# Patient Record
Sex: MALE | Race: WHITE | NOT HISPANIC OR LATINO | Employment: OTHER | URBAN - METROPOLITAN AREA
[De-identification: names, ages, dates, MRNs, and addresses within clinical notes are randomized per-mention and may not be internally consistent; named-entity substitution may affect disease eponyms.]

---

## 2021-07-16 ENCOUNTER — APPOINTMENT (OUTPATIENT)
Dept: CT IMAGING | Facility: HOSPITAL | Age: 71
End: 2021-07-16

## 2021-07-16 ENCOUNTER — HOSPITAL ENCOUNTER (INPATIENT)
Facility: HOSPITAL | Age: 71
LOS: 8 days | Discharge: HOME | End: 2021-07-24
Attending: EMERGENCY MEDICINE | Admitting: INTERNAL MEDICINE

## 2021-07-16 DIAGNOSIS — K85.00 IDIOPATHIC ACUTE PANCREATITIS WITHOUT INFECTION OR NECROSIS: Primary | ICD-10-CM

## 2021-07-16 DIAGNOSIS — E43 SEVERE PROTEIN-CALORIE MALNUTRITION (HCC): ICD-10-CM

## 2021-07-16 DIAGNOSIS — K74.69 OTHER CIRRHOSIS OF LIVER (HCC): ICD-10-CM

## 2021-07-16 DIAGNOSIS — K86.89 PANCREATIC MASS: ICD-10-CM

## 2021-07-16 PROBLEM — K85.90 ACUTE PANCREATITIS: Status: ACTIVE | Noted: 2021-07-16

## 2021-07-16 LAB
ALBUMIN SERPL-MCNC: 3.4 G/DL (ref 3.5–5.2)
ALBUMIN/GLOB SERPL: 1.2 G/DL
ALP SERPL-CCNC: 157 U/L (ref 39–117)
ALT SERPL W P-5'-P-CCNC: 28 U/L (ref 1–41)
ANION GAP SERPL CALCULATED.3IONS-SCNC: 11 MMOL/L (ref 5–15)
AST SERPL-CCNC: 36 U/L (ref 1–40)
BASOPHILS # BLD AUTO: 0.02 10*3/MM3 (ref 0–0.2)
BASOPHILS NFR BLD AUTO: 0.2 % (ref 0–1.5)
BILIRUB SERPL-MCNC: 0.4 MG/DL (ref 0–1.2)
BILIRUB UR QL STRIP: NEGATIVE
BUN SERPL-MCNC: 23 MG/DL (ref 8–23)
BUN/CREAT SERPL: 27.4 (ref 7–25)
CALCIUM SPEC-SCNC: 8.6 MG/DL (ref 8.6–10.5)
CHLORIDE SERPL-SCNC: 92 MMOL/L (ref 98–107)
CHOLEST SERPL-MCNC: 115 MG/DL (ref 0–200)
CHOLEST SERPL-MCNC: 118 MG/DL (ref 0–200)
CLARITY UR: CLEAR
CO2 SERPL-SCNC: 30 MMOL/L (ref 22–29)
COLOR UR: YELLOW
CREAT SERPL-MCNC: 0.84 MG/DL (ref 0.76–1.27)
D-LACTATE SERPL-SCNC: 1.5 MMOL/L (ref 0.5–2)
DEPRECATED RDW RBC AUTO: 47.6 FL (ref 37–54)
EOSINOPHIL # BLD AUTO: 0.02 10*3/MM3 (ref 0–0.4)
EOSINOPHIL NFR BLD AUTO: 0.2 % (ref 0.3–6.2)
ERYTHROCYTE [DISTWIDTH] IN BLOOD BY AUTOMATED COUNT: 13.3 % (ref 12.3–15.4)
ETHANOL BLD-MCNC: <10 MG/DL (ref 0–10)
FLUAV RNA RESP QL NAA+PROBE: NOT DETECTED
FLUBV RNA RESP QL NAA+PROBE: NOT DETECTED
GFR SERPL CREATININE-BSD FRML MDRD: 90 ML/MIN/1.73
GLOBULIN UR ELPH-MCNC: 2.9 GM/DL
GLUCOSE SERPL-MCNC: 105 MG/DL (ref 65–99)
GLUCOSE UR STRIP-MCNC: NEGATIVE MG/DL
HCT VFR BLD AUTO: 36.9 % (ref 37.5–51)
HDLC SERPL-MCNC: 30 MG/DL (ref 40–60)
HDLC SERPL-MCNC: 30 MG/DL (ref 40–60)
HGB BLD-MCNC: 12.6 G/DL (ref 13–17.7)
HGB UR QL STRIP.AUTO: NEGATIVE
HOLD SPECIMEN: NORMAL
IMM GRANULOCYTES # BLD AUTO: 0.02 10*3/MM3 (ref 0–0.05)
IMM GRANULOCYTES NFR BLD AUTO: 0.2 % (ref 0–0.5)
INR PPP: 1.12 (ref 0.85–1.16)
KETONES UR QL STRIP: NEGATIVE
LDLC SERPL CALC-MCNC: 51 MG/DL (ref 0–100)
LDLC SERPL CALC-MCNC: 53 MG/DL (ref 0–100)
LDLC/HDLC SERPL: 1.42 {RATIO}
LDLC/HDLC SERPL: 1.51 {RATIO}
LEUKOCYTE ESTERASE UR QL STRIP.AUTO: NEGATIVE
LIPASE SERPL-CCNC: 706 U/L (ref 13–60)
LYMPHOCYTES # BLD AUTO: 1.75 10*3/MM3 (ref 0.7–3.1)
LYMPHOCYTES NFR BLD AUTO: 21 % (ref 19.6–45.3)
MCH RBC QN AUTO: 33.1 PG (ref 26.6–33)
MCHC RBC AUTO-ENTMCNC: 34.1 G/DL (ref 31.5–35.7)
MCV RBC AUTO: 96.9 FL (ref 79–97)
MONOCYTES # BLD AUTO: 0.45 10*3/MM3 (ref 0.1–0.9)
MONOCYTES NFR BLD AUTO: 5.4 % (ref 5–12)
NEUTROPHILS NFR BLD AUTO: 6.09 10*3/MM3 (ref 1.7–7)
NEUTROPHILS NFR BLD AUTO: 73 % (ref 42.7–76)
NITRITE UR QL STRIP: NEGATIVE
NRBC BLD AUTO-RTO: 0 /100 WBC (ref 0–0.2)
PH UR STRIP.AUTO: 7 [PH] (ref 5–8)
PLATELET # BLD AUTO: 249 10*3/MM3 (ref 140–450)
PMV BLD AUTO: 9.3 FL (ref 6–12)
POTASSIUM SERPL-SCNC: 4.1 MMOL/L (ref 3.5–5.2)
PROT SERPL-MCNC: 6.3 G/DL (ref 6–8.5)
PROT UR QL STRIP: NEGATIVE
PROTHROMBIN TIME: 14.1 SECONDS (ref 11.4–14.4)
RBC # BLD AUTO: 3.81 10*6/MM3 (ref 4.14–5.8)
SARS-COV-2 RNA RESP QL NAA+PROBE: NOT DETECTED
SODIUM SERPL-SCNC: 133 MMOL/L (ref 136–145)
SP GR UR STRIP: 1.02 (ref 1–1.03)
TRIGL SERPL-MCNC: 212 MG/DL (ref 0–150)
TRIGL SERPL-MCNC: 214 MG/DL (ref 0–150)
UROBILINOGEN UR QL STRIP: NORMAL
VLDLC SERPL-MCNC: 34 MG/DL (ref 5–40)
VLDLC SERPL-MCNC: 35 MG/DL (ref 5–40)
WBC # BLD AUTO: 8.35 10*3/MM3 (ref 3.4–10.8)
WHOLE BLOOD HOLD SPECIMEN: NORMAL

## 2021-07-16 PROCEDURE — 25010000002 MORPHINE PER 10 MG: Performed by: EMERGENCY MEDICINE

## 2021-07-16 PROCEDURE — 25010000002 IOPAMIDOL 61 % SOLUTION: Performed by: EMERGENCY MEDICINE

## 2021-07-16 PROCEDURE — 81003 URINALYSIS AUTO W/O SCOPE: CPT | Performed by: EMERGENCY MEDICINE

## 2021-07-16 PROCEDURE — 25010000002 ONDANSETRON PER 1 MG: Performed by: EMERGENCY MEDICINE

## 2021-07-16 PROCEDURE — 85025 COMPLETE CBC W/AUTO DIFF WBC: CPT | Performed by: EMERGENCY MEDICINE

## 2021-07-16 PROCEDURE — 80061 LIPID PANEL: CPT | Performed by: INTERNAL MEDICINE

## 2021-07-16 PROCEDURE — 80053 COMPREHEN METABOLIC PANEL: CPT

## 2021-07-16 PROCEDURE — 83605 ASSAY OF LACTIC ACID: CPT | Performed by: EMERGENCY MEDICINE

## 2021-07-16 PROCEDURE — 99284 EMERGENCY DEPT VISIT MOD MDM: CPT

## 2021-07-16 PROCEDURE — 74177 CT ABD & PELVIS W/CONTRAST: CPT

## 2021-07-16 PROCEDURE — 85610 PROTHROMBIN TIME: CPT | Performed by: EMERGENCY MEDICINE

## 2021-07-16 PROCEDURE — 93005 ELECTROCARDIOGRAM TRACING: CPT | Performed by: INTERNAL MEDICINE

## 2021-07-16 PROCEDURE — 80061 LIPID PANEL: CPT | Performed by: EMERGENCY MEDICINE

## 2021-07-16 PROCEDURE — 87636 SARSCOV2 & INF A&B AMP PRB: CPT | Performed by: EMERGENCY MEDICINE

## 2021-07-16 PROCEDURE — 99223 1ST HOSP IP/OBS HIGH 75: CPT | Performed by: INTERNAL MEDICINE

## 2021-07-16 PROCEDURE — 83690 ASSAY OF LIPASE: CPT

## 2021-07-16 PROCEDURE — 82077 ASSAY SPEC XCP UR&BREATH IA: CPT | Performed by: EMERGENCY MEDICINE

## 2021-07-16 RX ORDER — HEPARIN SODIUM 5000 [USP'U]/ML
5000 INJECTION, SOLUTION INTRAVENOUS; SUBCUTANEOUS EVERY 8 HOURS SCHEDULED
Status: DISCONTINUED | OUTPATIENT
Start: 2021-07-17 | End: 2021-07-24 | Stop reason: HOSPADM

## 2021-07-16 RX ORDER — SODIUM CHLORIDE 0.9 % (FLUSH) 0.9 %
10 SYRINGE (ML) INJECTION AS NEEDED
Status: DISCONTINUED | OUTPATIENT
Start: 2021-07-16 | End: 2021-07-24 | Stop reason: HOSPADM

## 2021-07-16 RX ORDER — MORPHINE SULFATE 4 MG/ML
3 INJECTION, SOLUTION INTRAMUSCULAR; INTRAVENOUS
Status: DISCONTINUED | OUTPATIENT
Start: 2021-07-16 | End: 2021-07-19

## 2021-07-16 RX ORDER — SODIUM CHLORIDE 0.9 % (FLUSH) 0.9 %
10 SYRINGE (ML) INJECTION EVERY 12 HOURS SCHEDULED
Status: DISCONTINUED | OUTPATIENT
Start: 2021-07-17 | End: 2021-07-24 | Stop reason: HOSPADM

## 2021-07-16 RX ORDER — FUROSEMIDE 40 MG/1
40 TABLET ORAL 2 TIMES DAILY
COMMUNITY

## 2021-07-16 RX ORDER — DEXTROSE AND SODIUM CHLORIDE 5; .45 G/100ML; G/100ML
100 INJECTION, SOLUTION INTRAVENOUS CONTINUOUS
Status: DISCONTINUED | OUTPATIENT
Start: 2021-07-17 | End: 2021-07-22

## 2021-07-16 RX ORDER — NICOTINE 21 MG/24HR
1 PATCH, TRANSDERMAL 24 HOURS TRANSDERMAL EVERY 24 HOURS
Status: DISCONTINUED | OUTPATIENT
Start: 2021-07-17 | End: 2021-07-24 | Stop reason: HOSPADM

## 2021-07-16 RX ORDER — MORPHINE SULFATE 4 MG/ML
4 INJECTION, SOLUTION INTRAMUSCULAR; INTRAVENOUS ONCE
Status: COMPLETED | OUTPATIENT
Start: 2021-07-16 | End: 2021-07-16

## 2021-07-16 RX ORDER — ONDANSETRON 2 MG/ML
4 INJECTION INTRAMUSCULAR; INTRAVENOUS EVERY 6 HOURS PRN
Status: DISCONTINUED | OUTPATIENT
Start: 2021-07-16 | End: 2021-07-24 | Stop reason: HOSPADM

## 2021-07-16 RX ORDER — NALOXONE HCL 0.4 MG/ML
0.4 VIAL (ML) INJECTION
Status: DISCONTINUED | OUTPATIENT
Start: 2021-07-16 | End: 2021-07-24 | Stop reason: HOSPADM

## 2021-07-16 RX ORDER — SODIUM CHLORIDE 9 MG/ML
10 INJECTION INTRAVENOUS AS NEEDED
Status: DISCONTINUED | OUTPATIENT
Start: 2021-07-16 | End: 2021-07-24 | Stop reason: HOSPADM

## 2021-07-16 RX ORDER — SPIRONOLACTONE 25 MG/1
25 TABLET ORAL DAILY
COMMUNITY
End: 2021-07-24 | Stop reason: HOSPADM

## 2021-07-16 RX ORDER — ASPIRIN 81 MG/1
81 TABLET, CHEWABLE ORAL DAILY
COMMUNITY

## 2021-07-16 RX ORDER — ONDANSETRON 2 MG/ML
4 INJECTION INTRAMUSCULAR; INTRAVENOUS ONCE
Status: COMPLETED | OUTPATIENT
Start: 2021-07-16 | End: 2021-07-16

## 2021-07-16 RX ORDER — ASPIRIN 81 MG/1
81 TABLET, CHEWABLE ORAL DAILY
Status: DISCONTINUED | OUTPATIENT
Start: 2021-07-17 | End: 2021-07-24 | Stop reason: HOSPADM

## 2021-07-16 RX ADMIN — MORPHINE SULFATE 4 MG: 4 INJECTION, SOLUTION INTRAMUSCULAR; INTRAVENOUS at 21:05

## 2021-07-16 RX ADMIN — ONDANSETRON 4 MG: 2 INJECTION INTRAMUSCULAR; INTRAVENOUS at 21:05

## 2021-07-16 RX ADMIN — IOPAMIDOL 100 ML: 612 INJECTION, SOLUTION INTRAVENOUS at 21:33

## 2021-07-17 ENCOUNTER — APPOINTMENT (OUTPATIENT)
Dept: CARDIOLOGY | Facility: HOSPITAL | Age: 71
End: 2021-07-17

## 2021-07-17 ENCOUNTER — APPOINTMENT (OUTPATIENT)
Dept: ULTRASOUND IMAGING | Facility: HOSPITAL | Age: 71
End: 2021-07-17

## 2021-07-17 ENCOUNTER — APPOINTMENT (OUTPATIENT)
Dept: MRI IMAGING | Facility: HOSPITAL | Age: 71
End: 2021-07-17

## 2021-07-17 LAB
25(OH)D3 SERPL-MCNC: 26.1 NG/ML (ref 30–100)
ALPHA-FETOPROTEIN: 1.18 NG/ML (ref 0–8.3)
ANION GAP SERPL CALCULATED.3IONS-SCNC: 10 MMOL/L (ref 5–15)
BASOPHILS # BLD AUTO: 0.02 10*3/MM3 (ref 0–0.2)
BASOPHILS NFR BLD AUTO: 0.4 % (ref 0–1.5)
BH CV ECHO MEAS - AI DEC SLOPE: 102.6 CM/SEC^2
BH CV ECHO MEAS - AI MAX PG: 17.4 MMHG
BH CV ECHO MEAS - AI MAX VEL: 208.8 CM/SEC
BH CV ECHO MEAS - AI P1/2T: 596.2 MSEC
BH CV ECHO MEAS - AO MAX PG (FULL): 6.5 MMHG
BH CV ECHO MEAS - AO MAX PG: 8.1 MMHG
BH CV ECHO MEAS - AO MEAN PG (FULL): 3.4 MMHG
BH CV ECHO MEAS - AO MEAN PG: 4.3 MMHG
BH CV ECHO MEAS - AO ROOT AREA (BSA CORRECTED): 2.6
BH CV ECHO MEAS - AO ROOT AREA: 14.4 CM^2
BH CV ECHO MEAS - AO ROOT DIAM: 4.3 CM
BH CV ECHO MEAS - AO V2 MAX: 142.3 CM/SEC
BH CV ECHO MEAS - AO V2 MEAN: 96.9 CM/SEC
BH CV ECHO MEAS - AO V2 VTI: 30.7 CM
BH CV ECHO MEAS - AVA(I,A): 1.4 CM^2
BH CV ECHO MEAS - AVA(I,D): 1.4 CM^2
BH CV ECHO MEAS - AVA(V,A): 1.4 CM^2
BH CV ECHO MEAS - AVA(V,D): 1.4 CM^2
BH CV ECHO MEAS - BSA(HAYCOCK): 1.6 M^2
BH CV ECHO MEAS - BSA: 1.6 M^2
BH CV ECHO MEAS - BZI_BMI: 15.3 KILOGRAMS/M^2
BH CV ECHO MEAS - BZI_METRIC_HEIGHT: 180.3 CM
BH CV ECHO MEAS - BZI_METRIC_WEIGHT: 49.9 KG
BH CV ECHO MEAS - LV MAX PG: 1.5 MMHG
BH CV ECHO MEAS - LV MEAN PG: 0.92 MMHG
BH CV ECHO MEAS - LV V1 MAX: 62.2 CM/SEC
BH CV ECHO MEAS - LV V1 MEAN: 44.7 CM/SEC
BH CV ECHO MEAS - LV V1 VTI: 12.7 CM
BH CV ECHO MEAS - LVOT AREA (M): 3.1 CM^2
BH CV ECHO MEAS - LVOT AREA: 3.3 CM^2
BH CV ECHO MEAS - LVOT DIAM: 2 CM
BH CV ECHO MEAS - RAP SYSTOLE: 3 MMHG
BH CV ECHO MEAS - RVSP: 21 MMHG
BH CV ECHO MEAS - SI(AO): 269.5 ML/M^2
BH CV ECHO MEAS - SI(LVOT): 25.4 ML/M^2
BH CV ECHO MEAS - SV(AO): 440.9 ML
BH CV ECHO MEAS - SV(LVOT): 41.6 ML
BH CV ECHO MEAS - TR MAX PG: 18 MMHG
BH CV ECHO MEAS - TR MAX VEL: 213.1 CM/SEC
BUN SERPL-MCNC: 20 MG/DL (ref 8–23)
BUN/CREAT SERPL: 30.8 (ref 7–25)
CALCIUM SPEC-SCNC: 8 MG/DL (ref 8.6–10.5)
CHLORIDE SERPL-SCNC: 96 MMOL/L (ref 98–107)
CO2 SERPL-SCNC: 27 MMOL/L (ref 22–29)
CREAT SERPL-MCNC: 0.65 MG/DL (ref 0.76–1.27)
DEPRECATED RDW RBC AUTO: 48.9 FL (ref 37–54)
EOSINOPHIL # BLD AUTO: 0.05 10*3/MM3 (ref 0–0.4)
EOSINOPHIL NFR BLD AUTO: 0.9 % (ref 0.3–6.2)
ERYTHROCYTE [DISTWIDTH] IN BLOOD BY AUTOMATED COUNT: 13.6 % (ref 12.3–15.4)
FERRITIN SERPL-MCNC: 162.4 NG/ML (ref 30–400)
FOLATE SERPL-MCNC: >20 NG/ML (ref 4.78–24.2)
GFR SERPL CREATININE-BSD FRML MDRD: 121 ML/MIN/1.73
GLUCOSE SERPL-MCNC: 98 MG/DL (ref 65–99)
HCT VFR BLD AUTO: 36.9 % (ref 37.5–51)
HGB BLD-MCNC: 12.6 G/DL (ref 13–17.7)
IMM GRANULOCYTES # BLD AUTO: 0.02 10*3/MM3 (ref 0–0.05)
IMM GRANULOCYTES NFR BLD AUTO: 0.4 % (ref 0–0.5)
INR PPP: 1.13 (ref 0.85–1.16)
IRON 24H UR-MRATE: 75 MCG/DL (ref 59–158)
IRON SATN MFR SERPL: 28 % (ref 20–50)
LYMPHOCYTES # BLD AUTO: 1.03 10*3/MM3 (ref 0.7–3.1)
LYMPHOCYTES NFR BLD AUTO: 18.5 % (ref 19.6–45.3)
MAXIMAL PREDICTED HEART RATE: 150 BPM
MCH RBC QN AUTO: 33.3 PG (ref 26.6–33)
MCHC RBC AUTO-ENTMCNC: 34.1 G/DL (ref 31.5–35.7)
MCV RBC AUTO: 97.6 FL (ref 79–97)
MONOCYTES # BLD AUTO: 0.37 10*3/MM3 (ref 0.1–0.9)
MONOCYTES NFR BLD AUTO: 6.7 % (ref 5–12)
NEUTROPHILS NFR BLD AUTO: 4.07 10*3/MM3 (ref 1.7–7)
NEUTROPHILS NFR BLD AUTO: 73.1 % (ref 42.7–76)
NRBC BLD AUTO-RTO: 0 /100 WBC (ref 0–0.2)
PHOSPHATE SERPL-MCNC: 3 MG/DL (ref 2.5–4.5)
PLATELET # BLD AUTO: 226 10*3/MM3 (ref 140–450)
PMV BLD AUTO: 9.6 FL (ref 6–12)
POTASSIUM SERPL-SCNC: 3.6 MMOL/L (ref 3.5–5.2)
PROTHROMBIN TIME: 14.2 SECONDS (ref 11.4–14.4)
RBC # BLD AUTO: 3.78 10*6/MM3 (ref 4.14–5.8)
RETICS # AUTO: 0.06 10*6/MM3 (ref 0.02–0.13)
RETICS/RBC NFR AUTO: 1.68 % (ref 0.7–1.9)
SODIUM SERPL-SCNC: 133 MMOL/L (ref 136–145)
STRESS TARGET HR: 128 BPM
TIBC SERPL-MCNC: 271 MCG/DL (ref 298–536)
TRANSFERRIN SERPL-MCNC: 182 MG/DL (ref 200–360)
TSH SERPL DL<=0.05 MIU/L-ACNC: 1.4 UIU/ML (ref 0.27–4.2)
VIT B12 BLD-MCNC: 704 PG/ML (ref 211–946)
WBC # BLD AUTO: 5.56 10*3/MM3 (ref 3.4–10.8)

## 2021-07-17 PROCEDURE — 85025 COMPLETE CBC W/AUTO DIFF WBC: CPT | Performed by: INTERNAL MEDICINE

## 2021-07-17 PROCEDURE — 25010000002 THIAMINE PER 100 MG: Performed by: INTERNAL MEDICINE

## 2021-07-17 PROCEDURE — 84100 ASSAY OF PHOSPHORUS: CPT | Performed by: INTERNAL MEDICINE

## 2021-07-17 PROCEDURE — 74183 MRI ABD W/O CNTR FLWD CNTR: CPT

## 2021-07-17 PROCEDURE — 83540 ASSAY OF IRON: CPT | Performed by: INTERNAL MEDICINE

## 2021-07-17 PROCEDURE — 82306 VITAMIN D 25 HYDROXY: CPT | Performed by: NURSE PRACTITIONER

## 2021-07-17 PROCEDURE — 93306 TTE W/DOPPLER COMPLETE: CPT

## 2021-07-17 PROCEDURE — 76705 ECHO EXAM OF ABDOMEN: CPT

## 2021-07-17 PROCEDURE — 85045 AUTOMATED RETICULOCYTE COUNT: CPT | Performed by: INTERNAL MEDICINE

## 2021-07-17 PROCEDURE — 99222 1ST HOSP IP/OBS MODERATE 55: CPT | Performed by: NURSE PRACTITIONER

## 2021-07-17 PROCEDURE — A9577 INJ MULTIHANCE: HCPCS | Performed by: INTERNAL MEDICINE

## 2021-07-17 PROCEDURE — 85610 PROTHROMBIN TIME: CPT | Performed by: INTERNAL MEDICINE

## 2021-07-17 PROCEDURE — 0 GADOBENATE DIMEGLUMINE 529 MG/ML SOLUTION: Performed by: INTERNAL MEDICINE

## 2021-07-17 PROCEDURE — 84466 ASSAY OF TRANSFERRIN: CPT | Performed by: INTERNAL MEDICINE

## 2021-07-17 PROCEDURE — 80048 BASIC METABOLIC PNL TOTAL CA: CPT | Performed by: INTERNAL MEDICINE

## 2021-07-17 PROCEDURE — 99233 SBSQ HOSP IP/OBS HIGH 50: CPT | Performed by: INTERNAL MEDICINE

## 2021-07-17 PROCEDURE — 84443 ASSAY THYROID STIM HORMONE: CPT | Performed by: INTERNAL MEDICINE

## 2021-07-17 PROCEDURE — 82728 ASSAY OF FERRITIN: CPT | Performed by: INTERNAL MEDICINE

## 2021-07-17 PROCEDURE — 93306 TTE W/DOPPLER COMPLETE: CPT | Performed by: INTERNAL MEDICINE

## 2021-07-17 PROCEDURE — 93010 ELECTROCARDIOGRAM REPORT: CPT | Performed by: INTERNAL MEDICINE

## 2021-07-17 PROCEDURE — 25010000002 HEPARIN (PORCINE) PER 1000 UNITS: Performed by: INTERNAL MEDICINE

## 2021-07-17 PROCEDURE — 82746 ASSAY OF FOLIC ACID SERUM: CPT | Performed by: INTERNAL MEDICINE

## 2021-07-17 PROCEDURE — 97165 OT EVAL LOW COMPLEX 30 MIN: CPT

## 2021-07-17 PROCEDURE — 82105 ALPHA-FETOPROTEIN SERUM: CPT | Performed by: NURSE PRACTITIONER

## 2021-07-17 PROCEDURE — 25010000002 MORPHINE PER 10 MG: Performed by: INTERNAL MEDICINE

## 2021-07-17 PROCEDURE — 83921 ORGANIC ACID SINGLE QUANT: CPT | Performed by: INTERNAL MEDICINE

## 2021-07-17 PROCEDURE — 82607 VITAMIN B-12: CPT | Performed by: INTERNAL MEDICINE

## 2021-07-17 RX ADMIN — GADOBENATE DIMEGLUMINE 9 ML: 529 INJECTION, SOLUTION INTRAVENOUS at 18:43

## 2021-07-17 RX ADMIN — SODIUM CHLORIDE, PRESERVATIVE FREE 10 ML: 5 INJECTION INTRAVENOUS at 08:54

## 2021-07-17 RX ADMIN — THIAMINE HYDROCHLORIDE 100 ML/HR: 100 INJECTION, SOLUTION INTRAMUSCULAR; INTRAVENOUS at 01:18

## 2021-07-17 RX ADMIN — MORPHINE SULFATE 3 MG: 4 INJECTION, SOLUTION INTRAMUSCULAR; INTRAVENOUS at 23:56

## 2021-07-17 RX ADMIN — PANCRELIPASE 24000 UNITS OF LIPASE: 24000; 76000; 120000 CAPSULE, DELAYED RELEASE PELLETS ORAL at 08:54

## 2021-07-17 RX ADMIN — DEXTROSE AND SODIUM CHLORIDE 100 ML/HR: 5; 450 INJECTION, SOLUTION INTRAVENOUS at 13:00

## 2021-07-17 RX ADMIN — HEPARIN SODIUM 5000 UNITS: 5000 INJECTION INTRAVENOUS; SUBCUTANEOUS at 05:15

## 2021-07-17 RX ADMIN — MORPHINE SULFATE 3 MG: 4 INJECTION, SOLUTION INTRAMUSCULAR; INTRAVENOUS at 15:07

## 2021-07-17 RX ADMIN — SODIUM CHLORIDE, PRESERVATIVE FREE 10 ML: 5 INJECTION INTRAVENOUS at 20:31

## 2021-07-17 RX ADMIN — HEPARIN SODIUM 5000 UNITS: 5000 INJECTION INTRAVENOUS; SUBCUTANEOUS at 20:30

## 2021-07-17 RX ADMIN — ASPIRIN 81 MG CHEWABLE TABLET 81 MG: 81 TABLET CHEWABLE at 09:05

## 2021-07-17 RX ADMIN — MORPHINE SULFATE 3 MG: 4 INJECTION, SOLUTION INTRAMUSCULAR; INTRAVENOUS at 04:19

## 2021-07-17 RX ADMIN — DEXTROSE AND SODIUM CHLORIDE 100 ML/HR: 5; 450 INJECTION, SOLUTION INTRAVENOUS at 00:33

## 2021-07-17 RX ADMIN — HEPARIN SODIUM 5000 UNITS: 5000 INJECTION INTRAVENOUS; SUBCUTANEOUS at 15:07

## 2021-07-17 RX ADMIN — MORPHINE SULFATE 3 MG: 4 INJECTION, SOLUTION INTRAMUSCULAR; INTRAVENOUS at 09:05

## 2021-07-17 RX ADMIN — MORPHINE SULFATE 3 MG: 4 INJECTION, SOLUTION INTRAMUSCULAR; INTRAVENOUS at 19:37

## 2021-07-17 NOTE — PLAN OF CARE
Goal Outcome Evaluation:  Plan of Care Reviewed With: patient           Outcome Summary: OT eval complete.  Pt independent with bed mobility,  independent to don button up shirt over hospital gown, independent to don socks.  Pt independent STS,  SBA to ambulate 40 ft without AD.  Pt is at baseline with self care and does not demonstrate any deficits which would require OT intervention at this time. Recommend home upon d/c.

## 2021-07-17 NOTE — H&P
[facility] Medicine Services  HISTORY AND PHYSICAL    Patient Name: Abhijit Aldrich  : 1950  MRN: [id number]  Primary Care Physician: Nellie Ocasio MD  Date of admission: 2021      Subjective   Subjective     Chief Complaint:  Abdominal pain    HPI:  Abhijit Aldrich is a 71 y.o. male with a PMH significant for PVD status post aortic femoral bypass (per patient), history of aortic aneurysm status post repair, tobacco abuse, chronic pancreatitis who presents to the ED due to abdominal pain.  Patient reports onset of abdominal pain this past Wednesday which he attributes to increased travel over the past week.  Patient reports abdominal pain radiating to his back with associated decreased appetite, increased abdominal distention and increased bilateral lower extremity edema.  He seen his outpatient gastroenterologist who has been following his chronic pancreatitis.  His gastroenterologist encouraged him to come to  for further evaluation and treatment for a higher level of care.  Patient denies vomiting.  Additionally, patient reports onset of pancreatitis 3 years ago progressive weight loss over this timeframe.  Patient also reports onset of anterior right thigh numbness 3 days ago.  He denies injury.  He reports chronic low back pain which may be a little worse than baseline.  He denies difficulty moving his legs secondary to weakness, saddle paresthesias, bowel or bladder incontinence.      COVID Details:    Symptoms:    [x] NONE [] Fever []  Cough [] Shortness of breath [] Change in taste/smell      The patient has started, but not completed, their COVID-19 vaccination series.      Review of Systems   Constitutional: Positive for appetite change. Negative for fever.   HENT: Negative.    Eyes: Negative.    Respiratory: Negative.    Cardiovascular: Positive for leg swelling.   Gastrointestinal: Positive for abdominal pain and nausea. Negative for vomiting.   Endocrine:  Negative.    Genitourinary: Positive for difficulty urinating (Chronic).   Musculoskeletal: Positive for back pain and gait problem (Due to edema).   Skin: Negative.    Allergic/Immunologic: Negative.    Hematological: Negative.    Psychiatric/Behavioral: Negative.      All other systems reviewed and are negative.     Personal History     Past Medical History:   Diagnosis Date   • CAD (coronary artery disease)    • Cirrhosis (CMS/HCC)    • Hypertension    • PVD (peripheral vascular disease) (CMS/HCC)        Past Surgical History:   Procedure Laterality Date   • ABDOMINAL AORTIC ANEURYSM REPAIR     • AORTA BIFEMORAL BYPASS         Family History:  family history includes No Known Problems in his mother. Otherwise pertinent FHx was reviewed and unremarkable.     Social History:  reports that he has been smoking. He has been smoking about 1.00 pack per day. He has never used smokeless tobacco. He reports previous alcohol use. He reports current drug use. Drug: Marijuana.  Social History     Social History Narrative   • Not on file       Medications:  Available home medication information reviewed.  Medications Prior to Admission   Medication Sig Dispense Refill Last Dose   • aspirin 81 MG chewable tablet Chew 81 mg Daily.   7/14/2021 at 0900   • furosemide (LASIX) 40 MG tablet Take 40 mg by mouth 2 (Two) Times a Day.   7/16/2021 at 0900   • pancrelipase, Lip-Prot-Amyl, (CREON) 99275-84017 units capsule delayed-release particles capsule Take 24,000 units of lipase by mouth 3 (Three) Times a Day With Meals.   7/16/2021 at 0900   • spironolactone (ALDACTONE) 25 MG tablet Take 25 mg by mouth Daily.   7/16/2021 at 0900       No Known Allergies    Objective   Objective     Vital Signs:   Temp:  [98.5 °F (36.9 °C)] 98.5 °F (36.9 °C)  Heart Rate:  [63] 63  Resp:  [20] 20  BP: (104-115)/(70-97) 113/70       Physical Exam   Constitutional: Awake, alert, cachectic male patient  Eyes: PERRLA, sclerae anicteric, no conjunctival  injection  HENT: NCAT, mucous membranes moist  Neck: Supple, no thyromegaly, no lymphadenopathy, trachea midline  Respiratory: Moderate air movement with scant wheezes, nonlabored respirations   Cardiovascular: RRR, no murmurs, rubs, or gallops, palpable pedal pulses bilaterally  Gastrointestinal: Positive bowel sounds, soft, mildly tender, mildly distended abdomen  Musculoskeletal: 1+ bilateral ankle edema, no clubbing or cyanosis to extremities  Psychiatric: Appropriate affect, cooperative  Neurologic: Oriented x 3, strength symmetric in all extremities, Cranial Nerves grossly intact to confrontation, speech clear  Skin: No rashes      Result Review:  I have personally reviewed the results from the time of this admission to 7/16/2021 23:50 EDT and agree with these findings:  [x]  Laboratory  []  Microbiology  [x]  Radiology  [x]  EKG/Telemetry   []  Cardiology/Vascular   []  Pathology  []  Old records  []  Other:      LAB RESULTS:      Lab 07/16/21  2153 07/16/21  1721   WBC  --  8.35   HEMOGLOBIN  --  12.6*   HEMATOCRIT  --  36.9*   PLATELETS  --  249   NEUTROS ABS  --  6.09   IMMATURE GRANS (ABS)  --  0.02   LYMPHS ABS  --  1.75   MONOS ABS  --  0.45   EOS ABS  --  0.02   MCV  --  96.9   LACTATE  --  1.5   PROTIME 14.1  --    INR 1.12  --          Lab 07/16/21  1721   SODIUM 133*   POTASSIUM 4.1   CHLORIDE 92*   CO2 30.0*   ANION GAP 11.0   BUN 23   CREATININE 0.84   GLUCOSE 105*   CALCIUM 8.6         Lab 07/16/21  1721   TOTAL PROTEIN 6.3   ALBUMIN 3.40*   GLOBULIN 2.9   ALT (SGPT) 28   AST (SGOT) 36   BILIRUBIN 0.4   ALK PHOS 157*   LIPASE 706*             Lab 07/16/21  1721   CHOLESTEROL 118  115   LDL CHOL 53  51   HDL CHOL 30*  30*   TRIGLYCERIDES 214*  212*             UA    Urinalysis 7/16/21   Specific Parker, UA 1.017   Ketones, UA Negative   Blood, UA Negative   Leukocytes, UA Negative   Nitrite, UA Negative             Microbiology Results (last 10 days)     Procedure Component Value - Date/Time     COVID-19 and FLU A/B PCR - Swab, Nasopharynx [[id number]]  (Normal) Collected: 07/16/21 1934    Lab Status: Final result Specimen: Swab from Nasopharynx Updated: 07/16/21 2012     COVID19 Not Detected     Influenza A PCR Not Detected     Influenza B PCR Not Detected    Narrative:      Fact sheet for providers: https://www.fda.gov/media/294619/download    Fact sheet for patients: https://www.fda.gov/media/040987/download    Test performed by PCR.          CT Abdomen Pelvis With Contrast    Result Date: 7/16/2021  CT Abdomen Pelvis W INDICATION: Generalized abdominal pain with pancreatitis and cirrhosis TECHNIQUE: CT of the abdomen and pelvis with IV contrast. Coronal and sagittal reconstructions were obtained.  Radiation dose reduction techniques included automated exposure control or exposure modulation based on body size. Count of known CT and cardiac nuc med studies performed in previous 12 months: 0. COMPARISON: None available. FINDINGS: Limited exam. Abdomen: Multiple small noncalcified nodules are seen at the lung bases. These may be chronic. There is ascites with cirrhosis. The pancreas is not well evaluated and there may be chronic prominence to the pancreatic duct. Pelvis: The patient appears cachectic with diffuse anasarca.  here is an S-shaped scoliosis. There is multilevel degenerative change involving the spine. There has been prior left hip arthroplasty.     Impression: 1. There is cirrhosis with ascites. The gallbladder is somewhat distended, and it is uncertain if there may be some inflammatory change around the pancreas. This area is of limited evaluation. 2. There are small noncalcified nodules at the lung bases. Consider obtaining prior chest CT if available for comparison. Signer Name: Tsering Frias MD  Signed: 7/16/2021 9:52 PM  Workstation Name: [id number]  [facility]          Assessment/Plan   Assessment & Plan     Active Hospital Problems    Diagnosis  POA   • **Acute  pancreatitis [K85.90]  Yes   • Cirrhosis (CMS/HCC) [K74.60]  Unknown   • CAD (coronary artery disease) [I25.10]  Unknown   • Hypertension [I10]  Unknown   • PVD (peripheral vascular disease) (CMS/HCC) [I73.9]  Unknown   • Severe protein-calorie malnutrition (CMS/HCC) [E43]  Unknown   • Anemia [D64.9]  Unknown   • Weight loss [R63.4]  Unknown   • Numbness of right anterior thigh [R20.0]  Unknown   This is a 71-year-old male patient with a PMH significant for PVD status post aortic femoral bypass (per patient), history of aortic aneurysm status post repair, tobacco abuse, chronic pancreatitis who presents to the ED due to abdominal pain found to have acute pancreatitis.    Acute on chronic pancreatitis  Cirrhosis  -Consult gastroenterology for a.m.  -Obtain outpatient records  -IVFs  -Morphine as needed for pain  -Advance diet as tolerated  -Reports alcohol use status post cessation around 5 years ago.  Will monitor with CIWA for now with banana bag x3 days  -Ordered to obtain outpatient records  -Continue home Creon    Severe protein calorie malnutrition  Progressive weight loss  -Mildly decreased albumin  -BMI 14.37  -Banana bag x3 days  -Nutrition consult    Anemia  -Anemia studies  -A.m. labs    Numbness to the anterior thigh  -PT/OT  -Consider imaging to T/L-spine  -No red flag symptoms currently    CAD  PVD  Hypertension  -Hold spironolactone, Lasix while giving IVFs  -Continue home aspirin      DVT prophylaxis: Heparin      CODE STATUS: Full code  Code Status and Medical Interventions:   Ordered at: 07/16/21 3445     Level Of Support Discussed With:    Patient     Code Status:    CPR     Medical Interventions (Level of Support Prior to Arrest):    Full       Admission Status:  I believe this patient meets INPATIENT status due to acute on chronic pancreatitis.  I feel patient’s risk for adverse outcomes and need for care warrant INPATIENT evaluation and I predict the patient’s care encounter to likely last  beyond 2 midnights.    Samina Scott, DO  07/16/21

## 2021-07-17 NOTE — PROGRESS NOTES
Malnutrition Severity Assessment    Patient Name:  Abhijit Aldrich  YOB: 1950  MRN: [id number]  Admit Date:  7/16/2021    Patient meets criteria for : Severe Malnutrition (Patient currently meets criteria for Severe, Chronic Malnutrition based on severe fat loss and severe muscle wasting.)    Malnutrition Severity Assessment  Malnutrition Type: Chronic Disease - Related Malnutrition     Malnutrition Type (last 8 hours)      Malnutrition Severity Assessment     Row Name 07/17/21 1337       Malnutrition Severity Assessment    Malnutrition Type  Chronic Disease - Related Malnutrition    Row Name 07/17/21 1337       Muscle Loss    Loss of Muscle Mass Findings  Severe Severe muscle wasting at temporalis, clavicular, acromion, deltoid, quadriceps, and calf regions    Row Name 07/17/21 1337       Fat Loss    Subcutaneous Fat Loss Findings  Severe Severe fat loss at orbital, buccal, and triceps regions    Row Name 07/17/21 1336       Criteria Met (Must meet criteria for severity in at least 2 of these categories: M Wasting, Fat Loss, Fluid, Secondary Signs, Wt. Status, Intake)    Patient meets criteria for   Severe Malnutrition Patient currently meets criteria for Severe, Chronic Malnutrition based on severe fat loss and severe muscle wasting.          Electronically signed by:  Saba Barr RD  07/17/21 13:40 EDT

## 2021-07-17 NOTE — PROGRESS NOTES
[facility] Medicine Services  PROGRESS NOTE    Patient Name: Abhijit Aldrich  : 1950  MRN: [id number]    Date of Admission: 2021  Primary Care Physician: Nellie Ocasio MD    Subjective   Subjective     CC:  abd pain/weight loss    HPI:  VSS. On RA. Reports pain is controlled and he feels well. Brother/sister present at bedside. 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 Hoping to get to the bottom of ongoing stomach issues    ROS:  Gen- No fevers, chills  CV- No chest pain, palpitations  Resp- No cough, dyspnea  GI- No N/V/D, +abd pain         Objective   Objective     Vital Signs:   Temp:  [97 °F (36.1 °C)-98.5 °F (36.9 °C)] 98 °F (36.7 °C)  Heart Rate:  [58-76] 58  Resp:  [16-20] 16  BP: ()/(70-97) 128/79     Physical Exam:  GEN- pleasant, appears older than stated age and cachetic  HEENT- atraumatic, normocephalic, eomi, poor dentition  NECK- supple, trachea midline, no masses  RESP: ctab, normal effort  CV: no murmurs, s1/s2, rrr  GI: soft, epigastric TTP   MSK: no edema noted, spontaneous movement of all extremities  NEURO: alert, oriented, no focal deficits  SKIN: no rashes  PSYCH: appropriate mood and affect       Results Reviewed:  LAB RESULTS:      Lab 21  0604 21  2153 21  1721   WBC 5.56  --  8.35   HEMOGLOBIN 12.6*  --  12.6*   HEMATOCRIT 36.9*  --  36.9*   PLATELETS 226  --  249   NEUTROS ABS 4.07  --  6.09   IMMATURE GRANS (ABS) 0.02  --  0.02   LYMPHS ABS 1.03  --  1.75   MONOS ABS 0.37  --  0.45   EOS ABS 0.05  --  0.02   MCV 97.6*  --  96.9   LACTATE  --   --  1.5   PROTIME 14.2 14.1  --          Lab 21  0604 21  1721   SODIUM 133* 133*   POTASSIUM 3.6 4.1   CHLORIDE 96* 92*   CO2 27.0 30.0*   ANION GAP 10.0 11.0   BUN 20 23   CREATININE 0.65* 0.84   GLUCOSE 98 105*   CALCIUM 8.0* 8.6   PHOSPHORUS 3.0  --    TSH 1.400  --          Lab 21  1721   TOTAL  PROTEIN 6.3   ALBUMIN 3.40*   GLOBULIN 2.9   ALT (SGPT) 28   AST (SGOT) 36   BILIRUBIN 0.4   ALK PHOS 157*   LIPASE 706*         Lab 07/17/21  0604 07/16/21  2153   PROTIME 14.2 14.1   INR 1.13 1.12         Lab 07/16/21  1721   CHOLESTEROL 118  115   LDL CHOL 53  51   HDL CHOL 30*  30*   TRIGLYCERIDES 214*  212*         Lab 07/17/21  0604   IRON 75   IRON SATURATION 28   TIBC 271*   TRANSFERRIN 182*   FERRITIN 162.40         Brief Urine Lab Results  (Last result in the past 365 days)      Color   Clarity   Blood   Leuk Est   Nitrite   Protein   CREAT   Urine HCG        07/16/21 1946 Yellow Clear Negative Negative Negative Negative               Microbiology Results Abnormal     Procedure Component Value - Date/Time    COVID-19 and FLU A/B PCR - Swab, Nasopharynx [[id number]]  (Normal) Collected: 07/16/21 1934    Lab Status: Final result Specimen: Swab from Nasopharynx Updated: 07/16/21 2012     COVID19 Not Detected     Influenza A PCR Not Detected     Influenza B PCR Not Detected    Narrative:      Fact sheet for providers: https://www.fda.gov/media/479310/download    Fact sheet for patients: https://www.fda.gov/media/693793/download    Test performed by PCR.          CT Abdomen Pelvis With Contrast    Result Date: 7/16/2021  CT Abdomen Pelvis W INDICATION: Generalized abdominal pain with pancreatitis and cirrhosis TECHNIQUE: CT of the abdomen and pelvis with IV contrast. Coronal and sagittal reconstructions were obtained.  Radiation dose reduction techniques included automated exposure control or exposure modulation based on body size. Count of known CT and cardiac nuc med studies performed in previous 12 months: 0. COMPARISON: None available. FINDINGS: Limited exam. Abdomen: Multiple small noncalcified nodules are seen at the lung bases. These may be chronic. There is ascites with cirrhosis. The pancreas is not well evaluated and there may be chronic prominence to the pancreatic duct. Pelvis: The patient  appears cachectic with diffuse anasarca.  here is an S-shaped scoliosis. There is multilevel degenerative change involving the spine. There has been prior left hip arthroplasty.     Impression: 1. There is cirrhosis with ascites. The gallbladder is somewhat distended, and it is uncertain if there may be some inflammatory change around the pancreas. This area is of limited evaluation. 2. There are small noncalcified nodules at the lung bases. Consider obtaining prior chest CT if available for comparison. Signer Name: Tsering Frias MD  Signed: 7/16/2021 9:52 PM  Workstation Name: [id number]  [facility]          I have reviewed the medications:  Scheduled Meds:aspirin, 81 mg, Oral, Daily  heparin (porcine), 5,000 Units, Subcutaneous, Q8H  nicotine, 1 patch, Transdermal, Q24H  pancrelipase (Lip-Prot-Amyl), 24,000 units of lipase, Oral, TID With Meals  sodium chloride, 10 mL, Intravenous, Q12H  IV Fluids 1000 mL + additives, 100 mL/hr, Intravenous, Daily      Continuous Infusions:dextrose 5 % and sodium chloride 0.45 %, 100 mL/hr, Last Rate: Stopped (07/17/21 0118)      PRN Meds:.Morphine **AND** naloxone  •  ondansetron  •  Sodium Chloride (PF)  •  sodium chloride    Assessment/Plan   Assessment & Plan     Active Hospital Problems    Diagnosis  POA   • **Acute pancreatitis [K85.90]  Yes   • Cirrhosis (CMS/HCC) [K74.60]  Unknown   • CAD (coronary artery disease) [I25.10]  Unknown   • Hypertension [I10]  Unknown   • PVD (peripheral vascular disease) (CMS/HCC) [I73.9]  Unknown   • Severe protein-calorie malnutrition (CMS/HCC) [E43]  Unknown   • Anemia [D64.9]  Unknown   • Weight loss [R63.4]  Unknown   • Numbness of right anterior thigh [R20.0]  Unknown      Resolved Hospital Problems   No resolved problems to display.        Brief Hospital Course to date:  Abhijit Aldrich is a 71-year-old male patient with a PMH significant for PVD status post aortic femoral bypass (per patient), history of aortic  aneurysm status post repair, tobacco abuse, chronic pancreatitis who presents to the ED due to abdominal pain found to have acute pancreatitis.    This patient's problems and plans were partially entered by my partner and updated as appropriate by me 07/17/21.         Acute on chronic pancreatitis  Cirrhosis with ascites and abd varices on CT imaging  Concern for possible malignancy and ?pancreatic head mass   -GI consulted, appreciate assistance, recs for MRCP today to clarify as concern for possible malignant picture given weight loss history/decline   -IVFs  -Morphine as needed for pain  -Advance diet as tolerated, but will keep NPO for MRCP   -Reports alcohol use remotely but reports none in years  -Continue home Creon  --GI has ordered further lab studies including AFP/CA 19-9, hep A/B serologies     Severe protein calorie malnutrition  Progressive weight loss  -Mildly decreased albumin  -BMI 14.37  -Banana bag x3 days  -Nutrition consult     Anemia  -Anemia studies  -A.m. labs     Numbness to the anterior thigh  -PT/OT  -Consider imaging to T/L-spine  -No red flag symptoms currently     CAD  PVD  Hypertension  -Hold spironolactone, Lasix while giving IVFs  -Continue home aspirin    DVT prophylaxis:  Medical DVT prophylaxis orders are present.       Disposition: I expect the patient to be discharged pending further GI w/u    CODE STATUS:   Code Status and Medical Interventions:   Ordered at: 07/16/21 8060     Level Of Support Discussed With:    Patient     Code Status:    CPR     Medical Interventions (Level of Support Prior to Arrest):    Full       Maine Dominguez MD  07/17/21

## 2021-07-17 NOTE — CONSULTS
Clinical Nutrition   Reason For Visit: Identified at risk by screening criteria, MST score 2+, BMI, Malnutrition Severity Assessment, Physician consult, Unintentional weight loss, Reduced oral intake    Patient Name: Abhijit Aldrich  YOB: 1950  MRN: [id number]  Date of Encounter: 07/17/21 13:21 EDT  Admission date: 7/16/2021      -Patient currently meets criteria for Severe, Chronic Malnutrition based on severe fat loss and severe muscle wasting. MSA note available in EMR. MD may include dx in hospital diagnoses list as deemed appropriate.    -Based on reported UBW of 160 lbs, patient has unintentionally lost 50 lbs. Exact timeframe of weight loss unclear.    -Ordered Boost Breeze with meals.    -Will order snacks for patient between meals when on solid food diet.    -Will provide nutrition education on ways to increase calories/protein in diet and possibly pancreatitis diet (low-fat) if appropriate.    Nutrition Assessment     Admission Problem List:  Abdominal pain/distension  BLE edema  Acute on chronic pancreatitis  Cirrhosis with ascites  Anemia      Applicable PMH:  PVD s/p aortic femoral bypass  Aortic aneurysm s/p repair  Tobacco  CAD  HTN  Chronic pancreatitis      Applicable medical tests/procedures since admission:  MRCP planned      Reported/Observed/Food/Nutrition Related History   Patient and family members present during visit. Patient reports he has been eating less than normal for quite some time now with resulting unintentional weight loss --- Per H&P the timeframe of this has been 3 years; per GI APRN note the timeframe has been 6-12 months. Exact time frame of decreased PO intake and unintentional weight loss is unclear. Patient states he weighed 126 lbs 7.5 weeks ago and that his UBW was 160 lbs. Has had minimal PO intake during the past 1 week r/t abdominal pain/distension. Was really hungry yesterday and ate a full meal from a fried chicken restaurant. Finally starting to feel  like eating. Has tried some of the Boost/Ensure supplements in the past, but has not been drinking any recently. Denies food allergies and difficulty chewing/swallowing.    Agreeable to orange/wildberry Boost Breeze. Agreeable to snacks between meals when appropriate PO diet ordered - cottage cheese and fruit, sandwich (peanut butter/jelly, roast beef, turkey, ham and cheese).      Anthropometrics   Height: 71 in  Weight: 110 lbs (standing weight 7/17 per ns doc)  BMI: 15.4  BMI classification: Underweight:<18.5kg/m2   IBW: 172 lbs    UBW: ~160 lbs per patient; no weight hx in EMR    Weight change: Patient reports unintentional weight loss of 50 lbs - exact timeframe unclear as H&P states x 3 years and GI APRN note states 6-12 months.    Nutrition Focused Physical Exam (7/17)     Subcutaneous fat:  Orbital Severe   Buccal Severe   Tricep Severe   Ribs- thoracic and lumbar region, lower back, midaxillary line Unable to determine at this time     Muscle:  Temple/temporalis Severe   Clavicle/acromion- pectoralis, deltoid Severe   Shoulder- deltoid Severe   Scapula- trapezius, latissimus dorsi Unable to determine at this time   Interosseous- dorsal hand Unable to determine at this time   Thigh- quadriceps Severe   Calf- gastrocnemius Severe       Labs reviewed   Labs reviewed: Yes    Medications reviewed   Medications reviewed: Yes  Pertinent: creon, thiamine (100 mg daily)    Needs Assessment (7/17)   Height used: 71 in  Weight used: 110 lbs/50 kg (actual wt)    Estimated Calories needs: ~1800 calories daily  30-35 kcal/kg = 8112-8439    Estimated Protein needs: ~88 g protein daily  1.5-2.0 g/kg =     Current Nutrition Prescription   PO: Diet Clear Liquid    Average PO intake: insufficient data    Nutrition Diagnosis     Problem Malnutrition  (severe, chronic)   Etiology Chronic pancreatitis, abdominal pain/distension, poor appetite   Signs/Symptoms Severe fat loss, severe muscle wasting       Problem Inadequate  oral intake   Etiology Chronic pancreatitis, abdominal pain/distension, poor appetite   Signs/Symptoms Pt reports minimal PO intake x 1 week prior to admission; Clear liquid diet at this time       Intervention   Intervention: Follow treatment progress, Care plan reviewed, Advised available snacks, Interview for preferences, Encourage intake, Supplement provided    -Ordered Boost Breeze with meals.  -Will provide nutrition education on ways to increase calories/protein in diet and possibly pancreatitis diet (low-fat) if appropriate.    Goal:   General: Nutrition support treatment  PO: Tolerate PO, Increase intake, Advace diet as medically appropriate   Additional goals: No further weight loss    Monitoring/Evaluation:   Monitoring/Evaluation: Per protocol, I&O, PO intake, Supplement intake, Pertinent labs, Weight, GI status, Symptoms, POC/GOC    Saba Barr RD  Time Spent: 60 min

## 2021-07-17 NOTE — THERAPY DISCHARGE NOTE
Acute Care - Occupational Therapy Discharge  [facility]    Patient Name: Abhijit Aldrich  : 1950    MRN: [id number]                              Today's Date: 2021       Admit Date: 2021    Visit Dx:     ICD-10-CM ICD-9-CM   1. Idiopathic acute pancreatitis without infection or necrosis  K85.00 577.0     Patient Active Problem List   Diagnosis   • Acute pancreatitis   • Cirrhosis (CMS/HCC)   • CAD (coronary artery disease)   • Hypertension   • PVD (peripheral vascular disease) (CMS/HCC)   • Severe protein-calorie malnutrition (CMS/HCC)   • Anemia   • Weight loss   • Numbness of right anterior thigh     Past Medical History:   Diagnosis Date   • CAD (coronary artery disease)    • Cirrhosis (CMS/HCC)    • Hypertension    • PVD (peripheral vascular disease) (CMS/HCC)      Past Surgical History:   Procedure Laterality Date   • ABDOMINAL AORTIC ANEURYSM REPAIR     • AORTA BIFEMORAL BYPASS       General Information     Row Name 21 1410          OT Time and Intention    Document Type  discharge evaluation/summary  -AC     Mode of Treatment  occupational therapy  -     Row Name 21 1410          General Information    Patient Profile Reviewed  yes  -AC     Prior Level of Function  independent:;all household mobility;community mobility;ADL's;driving uses a RW for community mobility  -AC     Existing Precautions/Restrictions  seizures  -     Barriers to Rehab  none identified  -AC     Row Name 21 1410          Living Environment    Lives With  alone dtr lives close by  -     Row Name 21 1410          Home Main Entrance    Number of Stairs, Main Entrance  one  -AC     Stair Railings, Main Entrance  none  -AC     Row Name 21 1410          Stairs Within Home, Primary    Stairs, Within Home, Primary  1 story, walk in shower with shower bench  -AC     Row Name 21 141          Cognition    Orientation Status (Cognition)  oriented x 4  -AC       User Key  (r) = Recorded By,  (t) = Taken By, (c) = Cosigned By    Initials Name Provider Type    Sharmin Ellis, OT Occupational Therapist        Mobility/ADL's     Row Name 07/17/21 1410          Bed Mobility    Bed Mobility  supine-sit;sit-supine  -AC     Supine-Sit Allen (Bed Mobility)  independent  -AC     Sit-Supine Allen (Bed Mobility)  independent  -AC     Row Name 07/17/21 1410          Transfers    Transfers  sit-stand transfer  -AC     Sit-Stand Allen (Transfers)  independent  -AC     Row Name 07/17/21 1410          Sit-Stand Transfer    Assistive Device (Sit-Stand Transfers)  other (see comments) No AD  -AC     Row Name 07/17/21 1410          Functional Mobility    Functional Mobility- Ind. Level  standby assist  -     Functional Mobility-Distance (Feet)  40  -     Row Name 07/17/21 1410          Activities of Daily Living    BADL Assessment/Intervention  lower body dressing;upper body dressing  -     Row Name 07/17/21 1410          Lower Body Dressing Assessment/Training    Allen Level (Lower Body Dressing)  don;socks;independent  -AC     Position (Lower Body Dressing)  unsupported sitting  -AC     Row Name 07/17/21 1410          Upper Body Dressing Assessment/Training    Allen Level (Upper Body Dressing)  don;front opening garment;independent  -AC     Position (Upper Body Dressing)  unsupported sitting  -AC     Comment (Upper Body Dressing)  RN disconnected IV and pt donned flannel shirt over hospital gown  -       User Key  (r) = Recorded By, (t) = Taken By, (c) = Cosigned By    Initials Name Provider Type    Sharmin Ellis, OT Occupational Therapist        Obj/Interventions     Row Name 07/17/21 1410          Sensory Assessment (Somatosensory)    Sensory Assessment (Somatosensory)  UE sensation intact;bilateral UE  -AC     Row Name 07/17/21 1410          Range of Motion Comprehensive    General Range of Motion  bilateral upper extremity ROM WNL  -     Row Name 07/17/21 1410           Strength Comprehensive (MMT)    Comment, General Manual Muscle Testing (MMT) Assessment  BUE grossly 4+/5  -AC       User Key  (r) = Recorded By, (t) = Taken By, (c) = Cosigned By    Initials Name Provider Type    Sharmin Ellis, OT Occupational Therapist        Goals/Plan    No documentation.       Clinical Impression     Row Name 07/17/21 1410          Pain Assessment    Additional Documentation  Pain Scale: Numbers Pre/Post-Treatment (Group)  -AC     Row Name 07/17/21 1410          Pain Scale: Numbers Pre/Post-Treatment    Pretreatment Pain Rating  4/10  -AC     Posttreatment Pain Rating  4/10  -     Pain Location  abdomen  -     Pain Intervention(s)  Repositioned  -AC     Row Name 07/17/21 1410          Plan of Care Review    Plan of Care Reviewed With  patient  -     Outcome Summary  OT silvana complete.  Pt independent with bed mobility,  independent to don button up shirt over hospital gown, independent to don socks.  Pt independent STS,  SBA to ambulate 40 ft without AD.  Pt is at baseline with self care and does not demonstrate any deficits which would require OT intervention at this time. Recommend home upon d/c.  -AC     Row Name 07/17/21 1410          Therapy Assessment/Plan (OT)    Criteria for Skilled Therapeutic Interventions Met (OT)  no;no problems identified which require skilled intervention  -     Therapy Frequency (OT)  evaluation only  -AC     Row Name 07/17/21 1410          Therapy Plan Review/Discharge Plan (OT)    Anticipated Discharge Disposition (OT)  home  -[facility] 07/17/21 1410          Vital Signs    Pre Systolic BP Rehab  138  -AC     Pre Treatment Diastolic BP  77  -AC     Post Systolic BP Rehab  131  -AC     Post Treatment Diastolic BP  92  -AC     Pretreatment Heart Rate (beats/min)  72  -AC     Posttreatment Heart Rate (beats/min)  96  -AC     Pre SpO2 (%)  99  -AC     O2 Delivery Pre Treatment  room air  -AC     O2 Delivery Intra Treatment  room air  -AC      Post SpO2 (%)  97  -AC     O2 Delivery Post Treatment  room air  -AC     Pre Patient Position  Supine  -AC     Intra Patient Position  Standing  -AC     Post Patient Position  Supine  -AC     Row Name 07/17/21 1410          Positioning and Restraints    Pre-Treatment Position  in bed  -AC     Post Treatment Position  bed  -AC     In Bed  supine;call light within reach;encouraged to call for assist;notified nsg  -       User Key  (r) = Recorded By, (t) = Taken By, (c) = Cosigned By    Initials Name Provider Type    Sharmin Ellis, OT Occupational Therapist        Outcome Measures     Row Name 07/17/21 1410          How much help from another is currently needed...    Putting on and taking off regular lower body clothing?  4  -AC     Bathing (including washing, rinsing, and drying)  4  -AC     Toileting (which includes using toilet bed pan or urinal)  4  -AC     Putting on and taking off regular upper body clothing  4  -AC     Taking care of personal grooming (such as brushing teeth)  4  -AC     Eating meals  4  -AC     AM-PAC 6 Clicks Score (OT)  24  -     Row Name 07/17/21 1410          Functional Assessment    Outcome Measure Options  AM-PAC 6 Clicks Daily Activity (OT)  -       User Key  (r) = Recorded By, (t) = Taken By, (c) = Cosigned By    Initials Name Provider Type    Sharmin Ellis, DAVID Occupational Therapist        Occupational Therapy Education                 Title: PT OT SLP Therapies (Done)     Topic: Occupational Therapy (Done)     Point: ADL training (Done)     Description:   Instruct learner(s) on proper safety adaptation and remediation techniques during self care or transfers.   Instruct in proper use of assistive devices.              Learning Progress Summary           Patient Acceptance, E, VU by  at 7/17/2021 1410    Comment: benefits of activity, role of OT                               User Key     Initials Effective Dates Name Provider Type Discipline     06/16/21 -  Jae  Sharmin MCCABE, OT Occupational Therapist OT              OT Recommendation and Plan  Retired Outcome Summary/Treatment Plan (OT)  Anticipated Discharge Disposition (OT): home  Therapy Frequency (OT): evaluation only  Plan of Care Review  Plan of Care Reviewed With: patient  Outcome Summary: OT evelyneal complete.  Pt independent with bed mobility,  independent to don button up shirt over hospital gown, independent to don socks.  Pt independent STS,  SBA to ambulate 40 ft without AD.  Pt is at baseline with self care and does not demonstrate any deficits which would require OT intervention at this time. Recommend home upon d/c.  Plan of Care Reviewed With: patient  Outcome Summary: OT eval complete.  Pt independent with bed mobility,  independent to don button up shirt over hospital gown, independent to don socks.  Pt independent STS,  SBA to ambulate 40 ft without AD.  Pt is at baseline with self care and does not demonstrate any deficits which would require OT intervention at this time. Recommend home upon d/c.     Time Calculation:   Time Calculation- OT     Row Name 07/17/21 1410             Time Calculation- OT    OT Start Time  1410  -AC      OT Received On  07/17/21  -AC         Untimed Charges    OT Eval/Re-eval Minutes  48  -AC         Total Minutes    Untimed Charges Total Minutes  48  -AC       Total Minutes  48  -AC        User Key  (r) = Recorded By, (t) = Taken By, (c) = Cosigned By    Initials Name Provider Type    AC Sharmin Rowe, OT Occupational Therapist        Therapy Charges for Today     Code Description Service Date Service Provider Modifiers Qty    17236464439  OT EVAL LOW COMPLEXITY 4 7/17/2021 Sharmin Rowe OT GO 1               Sharmin Rowe OT  7/17/2021

## 2021-07-17 NOTE — PLAN OF CARE
Goal Outcome Evaluation:  Plan of Care Reviewed With: patient        Progress: no change  Outcome Summary: VSS NSR with PAC's on monitor; A&O  LS clear; on room air; gait is steady; denies pain or nausea at time admission; tolerated clear liquids; call light within his reach / will continue to monitor

## 2021-07-17 NOTE — CONSULTS
[facility]astroenterology Consult    Referring Provider: No ref. provider found    PCP: Nellie Ocasio MD    Reason for Consultation: Acute on chronic pancreatitis, cirrhosis    Chief complaint: Abdominal pain, and weight loss    History of present illness:  Abhijit Aldrich is a 71 y.o. male who is admitted with worsening abdominal pain.  Patient reports that he has been struggling with an acute on chronic onset of worsening abdominal pain, weakness, fatigue, and unintentional weight loss over the past 6 to 9 months.  Patient reports that within the last few weeks he is sought care at several facilities being shuffled amongst them due to lack of specialty services; notes he was seen at [facility] and was referred to GI care at [facility] who diagnosed him with pancreatitis and sent him home.  Patient notes he is still been experiencing abdominal pain and general unwell feeling prompting his presentation to hospital.  Patient notes he is still experiencing epigastric abdominal pain, nausea, vomiting, and diarrhea; patient also reports over the last year he has lost approximately 50 to 60 pounds unintentionally.  Does not endorse any prior history of hepatobiliary disorders though he is on Creon with meals; no family history of liver or pancreas diseases reported.  Past medical, surgical, social, family history is reviewed for accuracy.  No document alleviating or exacerbating factors.  Patient does voice some concerns with prior diagnosis of pancreatitis and cirrhosis from prior providers and would like to get definitive diagnoses this admission.  Reports epigastric abdominal pain that is well controlled at time of exam.      Allergies:  Patient has no known allergies.    Scheduled Meds:  aspirin, 81 mg, Oral, Daily  heparin (porcine), 5,000 Units, Subcutaneous, Q8H  nicotine, 1 patch, Transdermal, Q24H  pancrelipase (Lip-Prot-Amyl), 24,000 units of lipase, Oral, TID With Meals  sodium  chloride, 10 mL, Intravenous, Q12H  IV Fluids 1000 mL + additives, 100 mL/hr, Intravenous, Daily         Infusions:  dextrose 5 % and sodium chloride 0.45 %, 100 mL/hr, Last Rate: Stopped (07/17/21 0118)        PRN Meds:  Morphine **AND** naloxone  •  ondansetron  •  Sodium Chloride (PF)  •  sodium chloride    Home Meds:  Medications Prior to Admission   Medication Sig Dispense Refill Last Dose   • aspirin 81 MG chewable tablet Chew 81 mg Daily.   7/14/2021 at 0900   • furosemide (LASIX) 40 MG tablet Take 40 mg by mouth 2 (Two) Times a Day.   7/16/2021 at 0900   • pancrelipase, Lip-Prot-Amyl, (CREON) 83677-98245 units capsule delayed-release particles capsule Take 24,000 units of lipase by mouth 3 (Three) Times a Day With Meals.   7/16/2021 at 0900   • spironolactone (ALDACTONE) 25 MG tablet Take 25 mg by mouth Daily.   7/16/2021 at 0900       ROS: Review of Systems   Constitutional: Positive for activity change, appetite change, fatigue and unexpected weight change. Negative for chills, diaphoresis and fever.   HENT: Negative for sore throat, trouble swallowing and voice change.    Eyes: Negative.    Respiratory: Negative for apnea, cough, choking, chest tightness, shortness of breath and stridor.    Cardiovascular: Negative for chest pain, palpitations and leg swelling.   Gastrointestinal: Positive for abdominal distention, abdominal pain, diarrhea, nausea and vomiting. Negative for anal bleeding, blood in stool, constipation and rectal pain.   Endocrine: Negative.    Genitourinary: Negative.    Musculoskeletal: Negative.    Skin: Negative for color change, pallor, rash and wound.   Allergic/Immunologic: Negative.    Neurological: Negative.    Hematological: Negative for adenopathy. Does not bruise/bleed easily.   Psychiatric/Behavioral: Negative.    All other systems reviewed and are negative.      PAST MED HX:  Past Medical History:   Diagnosis Date   • CAD (coronary artery disease)    • Cirrhosis (CMS/HCC)    •  "Hypertension    • PVD (peripheral vascular disease) (CMS/HCC)        PAST SURG HX:  Past Surgical History:   Procedure Laterality Date   • ABDOMINAL AORTIC ANEURYSM REPAIR     • AORTA BIFEMORAL BYPASS         FAM HX:  Family History   Problem Relation Age of Onset   • No Known Problems Mother        SOC HX:  Social History     Socioeconomic History   • Marital status:      Spouse name: Not on file   • Number of children: Not on file   • Years of education: Not on file   • Highest education level: Not on file   Tobacco Use   • Smoking status: Current Every Day Smoker     Packs/day: 1.00   • Smokeless tobacco: Never Used   Substance and Sexual Activity   • Alcohol use: Not Currently   • Drug use: Yes     Types: Marijuana   • Sexual activity: Not Currently       PHYSICAL EXAM  /92 (BP Location: Left arm, Patient Position: Lying)   Pulse 79   Temp 97.4 °F (36.3 °C) (Oral)   Resp 18   Ht 180.3 cm (71\")   Wt 50 kg (110 lb 3.2 oz)   SpO2 97%   BMI 15.37 kg/m²   Wt Readings from Last 3 Encounters:   07/17/21 50 kg (110 lb 3.2 oz)   ,body mass index is 15.37 kg/m².  Physical Exam  Vitals and nursing note reviewed.   Constitutional:       General: He is not in acute distress.     Appearance: He is ill-appearing. He is not toxic-appearing.      Comments: Pleasantly conversant, no acute distress.  Cachectic and chronically ill-appearing male.   HENT:      Head: Normocephalic and atraumatic.      Mouth/Throat:      Mouth: Mucous membranes are moist.      Dentition: Abnormal dentition.      Pharynx: Oropharynx is clear. No oropharyngeal exudate.   Eyes:      General: No scleral icterus.     Extraocular Movements: Extraocular movements intact.      Conjunctiva/sclera: Conjunctivae normal.      Pupils: Pupils are equal, round, and reactive to light.   Cardiovascular:      Rate and Rhythm: Normal rate and regular rhythm.      Pulses: Normal pulses.      Heart sounds: Normal heart sounds.   Pulmonary:      Effort: " Pulmonary effort is normal. No respiratory distress.      Breath sounds: Normal breath sounds.   Abdominal:      General: Abdomen is flat. Bowel sounds are normal. There is distension.      Palpations: Abdomen is soft. There is no mass.      Tenderness: There is no abdominal tenderness. There is no guarding or rebound.      Hernia: No hernia is present.   Genitourinary:     Comments: defer  Musculoskeletal:         General: Normal range of motion.      Cervical back: Normal range of motion and neck supple. No rigidity.      Right lower leg: No edema.      Left lower leg: No edema.   Lymphadenopathy:      Cervical: No cervical adenopathy.   Skin:     General: Skin is warm and dry.      Capillary Refill: Capillary refill takes less than 2 seconds.      Coloration: Skin is not jaundiced or pale.   Neurological:      General: No focal deficit present.      Mental Status: He is alert and oriented to person, place, and time.   Psychiatric:         Mood and Affect: Mood normal.         Behavior: Behavior normal.         Thought Content: Thought content normal.         Judgment: Judgment normal.         Results Review:   I reviewed the patient's new clinical results.  I reviewed the patient's new imaging results and agree with the interpretation.  I personally viewed and interpreted the patient's EKG/Telemetry data    Lab Results   Component Value Date    WBC 5.56 07/17/2021    HGB 12.6 (L) 07/17/2021    HGB 12.6 (L) 07/16/2021    HCT 36.9 (L) 07/17/2021    MCV 97.6 (H) 07/17/2021     07/17/2021       Lab Results   Component Value Date    INR 1.13 07/17/2021    INR 1.12 07/16/2021       Lab Results   Component Value Date    GLUCOSE 98 07/17/2021    BUN 20 07/17/2021    CREATININE 0.65 (L) 07/17/2021    EGFRIFNONA 121 07/17/2021    BCR 30.8 (H) 07/17/2021     (L) 07/17/2021    K 3.6 07/17/2021    CO2 27.0 07/17/2021    CALCIUM 8.0 (L) 07/17/2021    ALBUMIN 3.40 (L) 07/16/2021    ALKPHOS 157 (H) 07/16/2021     BILITOT 0.4 07/16/2021    ALT 28 07/16/2021    AST 36 07/16/2021     CT Abdomen Pelvis With Contrast  Result Date: 7/16/2021  CT Abdomen Pelvis W INDICATION: Generalized abdominal pain with pancreatitis and cirrhosis TECHNIQUE: CT of the abdomen and pelvis with IV contrast. Coronal and sagittal reconstructions were obtained.  Radiation dose reduction techniques included automated exposure control or exposure modulation based on body size. Count of known CT and cardiac nuc med studies performed in previous 12 months: 0. COMPARISON: None available. FINDINGS: Limited exam. Abdomen: Multiple small noncalcified nodules are seen at the lung bases. These may be chronic. There is ascites with cirrhosis. The pancreas is not well evaluated and there may be chronic prominence to the pancreatic duct. Pelvis: The patient appears cachectic with diffuse anasarca.  here is an S-shaped scoliosis. There is multilevel degenerative change involving the spine. There has been prior left hip arthroplasty.     1. There is cirrhosis with ascites. The gallbladder is somewhat distended, and it is uncertain if there may be some inflammatory change around the pancreas. This area is of limited evaluation. 2. There are small noncalcified nodules at the lung bases. Consider obtaining prior chest CT if available for comparison. Signer Name: Tsering Frias MD  Signed: 7/16/2021 9:52 PM  Workstation Name: [id number]  [email or web address]   Date Value Ref Range Status   07/16/2021 Not Detected Not Detected - Ref. Range Final     ASSESSMENTS/PLANS  1.  Acute pancreatitis of unknown etiology, concern for pancreatic head mass  2.  Epigastric abdominal pain, related to above  3.  Liver cirrhosis, ascites and abdominal varices noted on CT imaging  4.  Severe protein calorie malnutrition and unintentional weight loss    Abhijit Aldrich is a 71 y.o. male admitted to hospital with acute pancreatitis of unknown etiology.  CT imaging  reviewed which shows dilated pancreatic duct and fullness in region of pancreatic head concerning for pancreatic head mass (see image 1 below).  Additionally CT imaging shows evidence of liver cirrhosis with abdominal ascites and abdominal varices(see image 2 below) recommend MRCP today to further investigate hepatobiliary system; pending results, will consider ERCP this admission.        >>> Continue n.p.o.; following imaging, okay for clear liquid diet.  >>> Continue IV fluids, antiemetics, and pain control measures.  >>> MRCP today.  >>> Continue Creon   >>> We will check immunity to hepatitis A and B and vaccinate if indicated.  >>> AFP and CA 19-9.    I discussed the patient's findings and my recommendations with patient, family and nursing staff.    Trent Caballero, LOWELL  07/17/21  11:05 EDT
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
## 2021-07-17 NOTE — ED PROVIDER NOTES
"Subjective   Pt presents with abdominal pain.  RUQ/epi pain for about two days.  Swelling to abdomen and legs for same amount of time.  Some nausea, not really vomiting. No fever or chest pain.  He says he was admitted recently in [place] for same, told he has cirrhosis, had paracentesis.  He says he was \"referred\" up here because they didn't know what to do with him there but he is vague on who referred him to who.  He says today with his pain they decided to come up on their own because he figured that's what would happen anyway.    He has a history of alcoholism but says he hasn't had a drink in years.  He does not have cholesterol issues.      He says he has had about 19# unintentional weight loss.      History provided by:  Patient      Review of Systems   Constitutional: Negative for fever.   Respiratory: Negative for shortness of breath.    Cardiovascular: Positive for leg swelling. Negative for chest pain.   Gastrointestinal: Positive for abdominal distention and abdominal pain. Negative for diarrhea and vomiting.   All other systems reviewed and are negative.      Past Medical History:   Diagnosis Date   • CAD (coronary artery disease)    • Cirrhosis (CMS/HCC)    • Hypertension    • PVD (peripheral vascular disease) (CMS/HCC)        No Known Allergies    Past Surgical History:   Procedure Laterality Date   • ABDOMINAL AORTIC ANEURYSM REPAIR     • AORTA BIFEMORAL BYPASS         Family History   Problem Relation Age of Onset   • No Known Problems Mother        Social History     Socioeconomic History   • Marital status:      Spouse name: Not on file   • Number of children: Not on file   • Years of education: Not on file   • Highest education level: Not on file   Tobacco Use   • Smoking status: Current Every Day Smoker     Packs/day: 1.00   • Smokeless tobacco: Never Used   Substance and Sexual Activity   • Alcohol use: Not Currently   • Drug use: Yes     Types: Marijuana   • Sexual activity: Not Currently "           Objective   Physical Exam  Vitals and nursing note reviewed.   Constitutional:       General: He is not in acute distress.     Appearance: Normal appearance. He is not ill-appearing.   HENT:      Head: Normocephalic and atraumatic.      Mouth/Throat:      Mouth: Mucous membranes are moist.   Eyes:      General: No scleral icterus.        Right eye: No discharge.         Left eye: No discharge.      Conjunctiva/sclera: Conjunctivae normal.   Cardiovascular:      Rate and Rhythm: Normal rate and regular rhythm.      Heart sounds: No murmur heard.     Pulmonary:      Effort: Pulmonary effort is normal. No respiratory distress.      Breath sounds: Normal breath sounds. No wheezing.   Abdominal:      General: Bowel sounds are normal. There is distension.      Palpations: Abdomen is soft.      Tenderness: There is abdominal tenderness in the right upper quadrant and epigastric area. There is no guarding or rebound.   Musculoskeletal:         General: No swelling. Normal range of motion.      Cervical back: Normal range of motion and neck supple.      Right lower leg: Edema present.      Left lower leg: Edema present.   Skin:     General: Skin is warm and dry.      Findings: No rash.   Neurological:      General: No focal deficit present.      Mental Status: He is alert. Mental status is at baseline.   Psychiatric:         Mood and Affect: Mood normal.         Behavior: Behavior normal.         Thought Content: Thought content normal.         Procedures           ED Course         Lipase up. Imaging shows cirrhosis, mild pancreatitis.  Pain better with meds.  Patient stable on serial rechecks.  Discussed exam findings, test results so far and concerns in detail at the bedside.  Discussed need for admission for further evaluation and treatment.                                    MDM  Number of Diagnoses or Management Options     Amount and/or Complexity of Data Reviewed  Clinical lab tests: reviewed and  ordered  Tests in the radiology section of CPT®: ordered and reviewed  Discuss the patient with other providers: yes  Independent visualization of images, tracings, or specimens: yes        Final diagnoses:   Idiopathic acute pancreatitis without infection or necrosis       ED Disposition  ED Disposition     ED Disposition Condition Comment    Decision to Admit  Level of Care: Telemetry [5]   Diagnosis: Acute pancreatitis [577.0.ICD-9-CM]   Admitting Physician: KARIE GONZALEZ [[id number]]   Attending Physician: KARIE GONZALEZ [[id number]]   Bed Request Comments: reg            No follow-up provider specified.       Medication List      No changes were made to your prescriptions during this visit.          Reji Cha MD  07/16/21 7009

## 2021-07-18 LAB
ALBUMIN SERPL-MCNC: 3 G/DL (ref 3.5–5.2)
ALBUMIN/GLOB SERPL: 1.2 G/DL
ALP SERPL-CCNC: 147 U/L (ref 39–117)
ALT SERPL W P-5'-P-CCNC: 24 U/L (ref 1–41)
ANION GAP SERPL CALCULATED.3IONS-SCNC: 10 MMOL/L (ref 5–15)
AST SERPL-CCNC: 35 U/L (ref 1–40)
BASOPHILS # BLD AUTO: 0.02 10*3/MM3 (ref 0–0.2)
BASOPHILS NFR BLD AUTO: 0.3 % (ref 0–1.5)
BILIRUB SERPL-MCNC: 0.5 MG/DL (ref 0–1.2)
BUN SERPL-MCNC: 14 MG/DL (ref 8–23)
BUN/CREAT SERPL: 25 (ref 7–25)
CALCIUM SPEC-SCNC: 8.3 MG/DL (ref 8.6–10.5)
CANCER AG19-9 SERPL-ACNC: 328.3 U/ML
CHLORIDE SERPL-SCNC: 97 MMOL/L (ref 98–107)
CO2 SERPL-SCNC: 26 MMOL/L (ref 22–29)
CREAT SERPL-MCNC: 0.56 MG/DL (ref 0.76–1.27)
DEPRECATED RDW RBC AUTO: 48.3 FL (ref 37–54)
EOSINOPHIL # BLD AUTO: 0.02 10*3/MM3 (ref 0–0.4)
EOSINOPHIL NFR BLD AUTO: 0.3 % (ref 0.3–6.2)
ERYTHROCYTE [DISTWIDTH] IN BLOOD BY AUTOMATED COUNT: 13.6 % (ref 12.3–15.4)
GFR SERPL CREATININE-BSD FRML MDRD: 144 ML/MIN/1.73
GLOBULIN UR ELPH-MCNC: 2.5 GM/DL
GLUCOSE SERPL-MCNC: 104 MG/DL (ref 65–99)
HAV IGM SERPL QL IA: NORMAL
HBV CORE IGM SERPL QL IA: NORMAL
HBV SURFACE AG SERPL QL IA: NORMAL
HCT VFR BLD AUTO: 38.8 % (ref 37.5–51)
HCV AB SER DONR QL: NORMAL
HEMOCCULT STL QL: NEGATIVE
HGB BLD-MCNC: 13.2 G/DL (ref 13–17.7)
IMM GRANULOCYTES # BLD AUTO: 0.02 10*3/MM3 (ref 0–0.05)
IMM GRANULOCYTES NFR BLD AUTO: 0.3 % (ref 0–0.5)
LYMPHOCYTES # BLD AUTO: 1.22 10*3/MM3 (ref 0.7–3.1)
LYMPHOCYTES NFR BLD AUTO: 19.6 % (ref 19.6–45.3)
MCH RBC QN AUTO: 32.8 PG (ref 26.6–33)
MCHC RBC AUTO-ENTMCNC: 34 G/DL (ref 31.5–35.7)
MCV RBC AUTO: 96.3 FL (ref 79–97)
MONOCYTES # BLD AUTO: 0.44 10*3/MM3 (ref 0.1–0.9)
MONOCYTES NFR BLD AUTO: 7.1 % (ref 5–12)
NEUTROPHILS NFR BLD AUTO: 4.51 10*3/MM3 (ref 1.7–7)
NEUTROPHILS NFR BLD AUTO: 72.4 % (ref 42.7–76)
NRBC BLD AUTO-RTO: 0 /100 WBC (ref 0–0.2)
PLATELET # BLD AUTO: 257 10*3/MM3 (ref 140–450)
PMV BLD AUTO: 10 FL (ref 6–12)
POTASSIUM SERPL-SCNC: 3.6 MMOL/L (ref 3.5–5.2)
PROT SERPL-MCNC: 5.5 G/DL (ref 6–8.5)
RBC # BLD AUTO: 4.03 10*6/MM3 (ref 4.14–5.8)
SODIUM SERPL-SCNC: 133 MMOL/L (ref 136–145)
WBC # BLD AUTO: 6.23 10*3/MM3 (ref 3.4–10.8)

## 2021-07-18 PROCEDURE — 85025 COMPLETE CBC W/AUTO DIFF WBC: CPT | Performed by: INTERNAL MEDICINE

## 2021-07-18 PROCEDURE — 86708 HEPATITIS A ANTIBODY: CPT | Performed by: NURSE PRACTITIONER

## 2021-07-18 PROCEDURE — 25010000002 MORPHINE PER 10 MG: Performed by: INTERNAL MEDICINE

## 2021-07-18 PROCEDURE — 25010000002 THIAMINE PER 100 MG: Performed by: INTERNAL MEDICINE

## 2021-07-18 PROCEDURE — 99233 SBSQ HOSP IP/OBS HIGH 50: CPT | Performed by: INTERNAL MEDICINE

## 2021-07-18 PROCEDURE — 82272 OCCULT BLD FECES 1-3 TESTS: CPT | Performed by: INTERNAL MEDICINE

## 2021-07-18 PROCEDURE — 80053 COMPREHEN METABOLIC PANEL: CPT | Performed by: INTERNAL MEDICINE

## 2021-07-18 PROCEDURE — 80074 ACUTE HEPATITIS PANEL: CPT | Performed by: NURSE PRACTITIONER

## 2021-07-18 PROCEDURE — 97161 PT EVAL LOW COMPLEX 20 MIN: CPT | Performed by: PHYSICAL THERAPIST

## 2021-07-18 PROCEDURE — 25010000002 HEPARIN (PORCINE) PER 1000 UNITS: Performed by: INTERNAL MEDICINE

## 2021-07-18 PROCEDURE — 86301 IMMUNOASSAY TUMOR CA 19-9: CPT | Performed by: NURSE PRACTITIONER

## 2021-07-18 RX ORDER — CHOLECALCIFEROL (VITAMIN D3) 125 MCG
5 CAPSULE ORAL NIGHTLY PRN
Status: DISCONTINUED | OUTPATIENT
Start: 2021-07-18 | End: 2021-07-24 | Stop reason: HOSPADM

## 2021-07-18 RX ORDER — HYDROMORPHONE HYDROCHLORIDE 1 MG/ML
0.5 INJECTION, SOLUTION INTRAMUSCULAR; INTRAVENOUS; SUBCUTANEOUS ONCE
Status: COMPLETED | OUTPATIENT
Start: 2021-07-19 | End: 2021-07-18

## 2021-07-18 RX ORDER — HYDROCODONE BITARTRATE AND ACETAMINOPHEN 5; 325 MG/1; MG/1
1 TABLET ORAL EVERY 6 HOURS PRN
Status: DISCONTINUED | OUTPATIENT
Start: 2021-07-18 | End: 2021-07-18

## 2021-07-18 RX ORDER — POLYETHYLENE GLYCOL 3350 17 G
2 POWDER IN PACKET (EA) ORAL
Status: DISCONTINUED | OUTPATIENT
Start: 2021-07-18 | End: 2021-07-24 | Stop reason: HOSPADM

## 2021-07-18 RX ORDER — OXYCODONE HYDROCHLORIDE AND ACETAMINOPHEN 5; 325 MG/1; MG/1
1 TABLET ORAL EVERY 6 HOURS PRN
Status: DISCONTINUED | OUTPATIENT
Start: 2021-07-18 | End: 2021-07-22

## 2021-07-18 RX ADMIN — MORPHINE SULFATE 3 MG: 4 INJECTION, SOLUTION INTRAMUSCULAR; INTRAVENOUS at 17:34

## 2021-07-18 RX ADMIN — OXYCODONE HYDROCHLORIDE AND ACETAMINOPHEN 1 TABLET: 5; 325 TABLET ORAL at 16:54

## 2021-07-18 RX ADMIN — Medication 5 MG: at 23:31

## 2021-07-18 RX ADMIN — OXYCODONE HYDROCHLORIDE AND ACETAMINOPHEN 1 TABLET: 5; 325 TABLET ORAL at 11:07

## 2021-07-18 RX ADMIN — MORPHINE SULFATE 3 MG: 4 INJECTION, SOLUTION INTRAMUSCULAR; INTRAVENOUS at 08:43

## 2021-07-18 RX ADMIN — PANCRELIPASE 24000 UNITS OF LIPASE: 24000; 76000; 120000 CAPSULE, DELAYED RELEASE PELLETS ORAL at 08:43

## 2021-07-18 RX ADMIN — HEPARIN SODIUM 5000 UNITS: 5000 INJECTION INTRAVENOUS; SUBCUTANEOUS at 20:46

## 2021-07-18 RX ADMIN — THIAMINE HYDROCHLORIDE 100 ML/HR: 100 INJECTION, SOLUTION INTRAMUSCULAR; INTRAVENOUS at 08:51

## 2021-07-18 RX ADMIN — SODIUM CHLORIDE, PRESERVATIVE FREE 10 ML: 5 INJECTION INTRAVENOUS at 20:47

## 2021-07-18 RX ADMIN — PANCRELIPASE 24000 UNITS OF LIPASE: 24000; 76000; 120000 CAPSULE, DELAYED RELEASE PELLETS ORAL at 11:07

## 2021-07-18 RX ADMIN — DEXTROSE AND SODIUM CHLORIDE 100 ML/HR: 5; 450 INJECTION, SOLUTION INTRAVENOUS at 04:02

## 2021-07-18 RX ADMIN — MORPHINE SULFATE 3 MG: 4 INJECTION, SOLUTION INTRAMUSCULAR; INTRAVENOUS at 11:10

## 2021-07-18 RX ADMIN — MORPHINE SULFATE 3 MG: 4 INJECTION, SOLUTION INTRAMUSCULAR; INTRAVENOUS at 03:57

## 2021-07-18 RX ADMIN — ASPIRIN 81 MG CHEWABLE TABLET 81 MG: 81 TABLET CHEWABLE at 08:43

## 2021-07-18 RX ADMIN — DEXTROSE AND SODIUM CHLORIDE 100 ML/HR: 5; 450 INJECTION, SOLUTION INTRAVENOUS at 17:34

## 2021-07-18 RX ADMIN — HEPARIN SODIUM 5000 UNITS: 5000 INJECTION INTRAVENOUS; SUBCUTANEOUS at 05:03

## 2021-07-18 RX ADMIN — SODIUM CHLORIDE, PRESERVATIVE FREE 10 ML: 5 INJECTION INTRAVENOUS at 08:44

## 2021-07-18 RX ADMIN — PANCRELIPASE 24000 UNITS OF LIPASE: 24000; 76000; 120000 CAPSULE, DELAYED RELEASE PELLETS ORAL at 16:54

## 2021-07-18 RX ADMIN — HYDROMORPHONE HYDROCHLORIDE 0.5 MG: 1 INJECTION, SOLUTION INTRAMUSCULAR; INTRAVENOUS; SUBCUTANEOUS at 23:31

## 2021-07-18 RX ADMIN — MORPHINE SULFATE 3 MG: 4 INJECTION, SOLUTION INTRAMUSCULAR; INTRAVENOUS at 20:47

## 2021-07-18 RX ADMIN — OXYCODONE HYDROCHLORIDE AND ACETAMINOPHEN 1 TABLET: 5; 325 TABLET ORAL at 23:03

## 2021-07-18 RX ADMIN — DEXTROSE AND SODIUM CHLORIDE 100 ML/HR: 5; 450 INJECTION, SOLUTION INTRAVENOUS at 23:32

## 2021-07-18 RX ADMIN — DEXTROSE AND SODIUM CHLORIDE 100 ML/HR: 5; 450 INJECTION, SOLUTION INTRAVENOUS at 05:04

## 2021-07-18 RX ADMIN — MORPHINE SULFATE 3 MG: 4 INJECTION, SOLUTION INTRAMUSCULAR; INTRAVENOUS at 14:52

## 2021-07-18 RX ADMIN — HEPARIN SODIUM 5000 UNITS: 5000 INJECTION INTRAVENOUS; SUBCUTANEOUS at 14:53

## 2021-07-18 NOTE — PROGRESS NOTES
[facility] Medicine Services  PROGRESS NOTE    Patient Name: Abhijit Aldrich  : 1950  MRN: [id number]    Date of Admission: 2021  Primary Care Physician: Nellie Ocasio MD    Subjective   Subjective     CC:  abd pain/weight loss    HPI:  Sitting up in chair. No family present. Reports more abd distension and pain today following USN/imaging yesterday with probe pushing. Asking for percocet. Tearful when discussing possilbity of this being malignant.    ROS:  Gen- No fevers, chills  CV- No chest pain, palpitations  Resp- No cough, dyspnea  GI- No N/V/D, +abd pain         Objective   Objective     Vital Signs:   Temp:  [97.4 °F (36.3 °C)-98.3 °F (36.8 °C)] 97.8 °F (36.6 °C)  Heart Rate:  [80-89] 89  Resp:  [16-18] 18  BP: (114-141)/(81-97) 129/81     Physical Exam:  GEN- pleasant, appears older than stated age and cachetic  HEENT- atraumatic, normocephalic, eomi, poor dentition  NECK- supple, trachea midline, no masses  RESP: ctab, normal effort  CV: no murmurs, s1/s2, rrr  GI: soft, epigastric TTP , more distended today   MSK: no edema noted, spontaneous movement of all extremities  NEURO: alert, oriented, no focal deficits  SKIN: no rashes  PSYCH: appropriate mood and affect       Results Reviewed:  LAB RESULTS:      Lab 21  0546 21  0604 21  2153 21  1721   WBC 6.23 5.56  --  8.35   HEMOGLOBIN 13.2 12.6*  --  12.6*   HEMATOCRIT 38.8 36.9*  --  36.9*   PLATELETS 257 226  --  249   NEUTROS ABS 4.51 4.07  --  6.09   IMMATURE GRANS (ABS) 0.02 0.02  --  0.02   LYMPHS ABS 1.22 1.03  --  1.75   MONOS ABS 0.44 0.37  --  0.45   EOS ABS 0.02 0.05  --  0.02   MCV 96.3 97.6*  --  96.9   LACTATE  --   --   --  1.5   PROTIME  --  14.2 14.1  --          Lab 21  0546 21  0604 21  1721   SODIUM 133* 133* 133*   POTASSIUM 3.6 3.6 4.1   CHLORIDE 97* 96* 92*   CO2 26.0 27.0 30.0*   ANION GAP 10.0 10.0 11.0   BUN 14 20 23   CREATININE 0.56* 0.65*  0.84   GLUCOSE 104* 98 105*   CALCIUM 8.3* 8.0* 8.6   PHOSPHORUS  --  3.0  --    TSH  --  1.400  --          Lab 07/18/21  0546 07/16/21  1721   TOTAL PROTEIN 5.5* 6.3   ALBUMIN 3.00* 3.40*   GLOBULIN 2.5 2.9   ALT (SGPT) 24 28   AST (SGOT) 35 36   BILIRUBIN 0.5 0.4   ALK PHOS 147* 157*   LIPASE  --  706*         Lab 07/17/21  0604 07/16/21  2153   PROTIME 14.2 14.1   INR 1.13 1.12         Lab 07/16/21  1721   CHOLESTEROL 118  115   LDL CHOL 53  51   HDL CHOL 30*  30*   TRIGLYCERIDES 214*  212*         Lab 07/17/21  0604   IRON 75   IRON SATURATION 28   TIBC 271*   TRANSFERRIN 182*   FERRITIN 162.40   FOLATE >20.00   VITAMIN B 12 704         Brief Urine Lab Results  (Last result in the past 365 days)      Color   Clarity   Blood   Leuk Est   Nitrite   Protein   CREAT   Urine HCG        07/16/21 1946 Yellow Clear Negative Negative Negative Negative               Microbiology Results Abnormal     Procedure Component Value - Date/Time    COVID-19 and FLU A/B PCR - Swab, Nasopharynx [[id number]]  (Normal) Collected: 07/16/21 1934    Lab Status: Final result Specimen: Swab from Nasopharynx Updated: 07/16/21 2012     COVID19 Not Detected     Influenza A PCR Not Detected     Influenza B PCR Not Detected    Narrative:      Fact sheet for providers: https://www.fda.gov/media/448790/download    Fact sheet for patients: https://www.fda.gov/media/556256/download    Test performed by PCR.          Adult Transthoracic Echo Complete w/ Color, Spectral and Contrast if necessary per protocol    Result Date: 7/17/2021  · The study is technically difficult for diagnosis with poor acoustic windows. The quality of the study is extremely limited due to limited views obtained, patient body habitus and patient positioning. · Left ventricular ejection fraction appears to be 51 - 55%. Left ventricular systolic function is normal. · There is calcification of the aortic valve. · Mild aortic valve regurgitation is present. · Mild mitral  valve regurgitation is present.      CT Abdomen Pelvis With Contrast    Result Date: 7/16/2021  CT Abdomen Pelvis W INDICATION: Generalized abdominal pain with pancreatitis and cirrhosis TECHNIQUE: CT of the abdomen and pelvis with IV contrast. Coronal and sagittal reconstructions were obtained.  Radiation dose reduction techniques included automated exposure control or exposure modulation based on body size. Count of known CT and cardiac nuc med studies performed in previous 12 months: 0. COMPARISON: None available. FINDINGS: Limited exam. Abdomen: Multiple small noncalcified nodules are seen at the lung bases. These may be chronic. There is ascites with cirrhosis. The pancreas is not well evaluated and there may be chronic prominence to the pancreatic duct. Pelvis: The patient appears cachectic with diffuse anasarca.  here is an S-shaped scoliosis. There is multilevel degenerative change involving the spine. There has been prior left hip arthroplasty.     Impression: 1. There is cirrhosis with ascites. The gallbladder is somewhat distended, and it is uncertain if there may be some inflammatory change around the pancreas. This area is of limited evaluation. 2. There are small noncalcified nodules at the lung bases. Consider obtaining prior chest CT if available for comparison. Signer Name: Tsering Frias MD  Signed: 7/16/2021 9:52 PM  Workstation Name: [id number]  [facility] Gallbladder    Result Date: 7/18/2021  EXAMINATION: US GALLBLADDER-  INDICATION: eval for stones; K85.00-Idiopathic acute pancreatitis without necrosis or infection  COMPARISON: NONE  FINDINGS: Sonographic grayscale and color Doppler evaluation of the right upper quadrant demonstrates  Increased echogenicity and nodular liver surface contour compatible with parenchymal disease and cirrhosis. No suspicious focal hepatic lesions or biliary ductal dilatation. Cholelithiasis is present with some small stones and sludge,  without definite wall thickening. Unremarkable 5 mm common bile duct.  Right kidney measures 9.7 cm in length, without apparent mass or hydronephrosis. Normal color Doppler flow.      Impression: Redemonstrated moderate to large volume ascites and abnormal appearance of the hepatic parenchyma compatible with parenchymal disease if not kartik cirrhosis.  Cholelithiasis is present without definite changes of cholecystitis.  This report was finalized on 7/18/2021 7:52 AM by Apollo Winters.        Results for orders placed during the hospital encounter of 07/16/21    Adult Transthoracic Echo Complete w/ Color, Spectral and Contrast if necessary per protocol    Interpretation Summary  · The study is technically difficult for diagnosis with poor acoustic windows. The quality of the study is extremely limited due to limited views obtained, patient body habitus and patient positioning.  · Left ventricular ejection fraction appears to be 51 - 55%. Left ventricular systolic function is normal.  · There is calcification of the aortic valve.  · Mild aortic valve regurgitation is present.  · Mild mitral valve regurgitation is present.      I have reviewed the medications:  Scheduled Meds:aspirin, 81 mg, Oral, Daily  heparin (porcine), 5,000 Units, Subcutaneous, Q8H  nicotine, 1 patch, Transdermal, Q24H  pancrelipase (Lip-Prot-Amyl), 24,000 units of lipase, Oral, TID With Meals  sodium chloride, 10 mL, Intravenous, Q12H  IV Fluids 1000 mL + additives, 100 mL/hr, Intravenous, Daily      Continuous Infusions:dextrose 5 % and sodium chloride 0.45 %, 100 mL/hr, Last Rate: Stopped (07/18/21 0850)      PRN Meds:.Morphine **AND** naloxone  •  nicotine polacrilex  •  ondansetron  •  oxyCODONE-acetaminophen  •  Sodium Chloride (PF)  •  sodium chloride    Assessment/Plan   Assessment & Plan     Active Hospital Problems    Diagnosis  POA   • **Acute pancreatitis [K85.90]  Yes   • Cirrhosis (CMS/HCC) [K74.60]  Unknown   • CAD (coronary  artery disease) [I25.10]  Unknown   • Hypertension [I10]  Unknown   • PVD (peripheral vascular disease) (CMS/HCC) [I73.9]  Unknown   • Severe protein-calorie malnutrition (CMS/HCC) [E43]  Unknown   • Anemia [D64.9]  Unknown   • Weight loss [R63.4]  Unknown   • Numbness of right anterior thigh [R20.0]  Unknown      Resolved Hospital Problems   No resolved problems to display.        Brief Hospital Course to date:  Abhijit Aldrich is a 71-year-old male patient with a PMH significant for PVD status post aortic femoral bypass (per patient), history of aortic aneurysm status post repair, tobacco abuse, chronic pancreatitis who presents to the ED due to abdominal pain found to have acute pancreatitis.    This patient's problems and plans were partially entered by my partner and updated as appropriate by me 07/18/21.         Acute on chronic pancreatitis  Cirrhosis with ascites and abd varices on CT imaging  Concern for possible malignancy and ?pancreatic head mass   Elevated CA 19-9  -GI consulted, appreciate assistance, MRCP done yesterday with read pending  --noted  significantly elevated- concerning for malignancy   -USN GB done 7/17 with mod/large volume ascites and evidence of liver cirrhosis  -Morphine as needed for pain, have also added oral percocet per patient request   -Advance diet as tolerated, nutrition consult   -Reports alcohol use remotely but reports none in years  -Continue home Creon  --GI has ordered further lab studies including AFP, hep A/B serologies- pending     Severe protein calorie malnutrition  Progressive weight loss  -Mildly decreased albumin  -BMI 14.37  -Banana bag x3 days  -Nutrition consult     Anemia  -Anemia studies  -A.m. labs     Numbness to the anterior thigh  -PT/OT  -Consider imaging to T/L-spine  -No red flag symptoms currently     CAD  PVD  Hypertension  -Hold spironolactone, Lasix while giving IVFs  -Continue home aspirin    DVT prophylaxis:  Medical DVT prophylaxis orders  are present.       Disposition: I expect the patient to be discharged pending further GI w/u    CODE STATUS:   Code Status and Medical Interventions:   Ordered at: 07/16/21 6503     Level Of Support Discussed With:    Patient     Code Status:    CPR     Medical Interventions (Level of Support Prior to Arrest):    Full       Maine Dominguez MD  07/18/21

## 2021-07-18 NOTE — PLAN OF CARE
Patient reports pain in abdomin of 8 or higher intermittently. Tx with morphine injection q 3 hrs PRN. Sinus arrhythmias with frequent PVCs,PACs, and PJCs. VSS. Patient is independent but has general weakness throughout body. Pt keeps urinal at bedside. No bed side alarm. MRI and US on gallbladder are pending. Continue to monitor.     Problem: Adult Inpatient Plan of Care  Goal: Plan of Care Review  Outcome: Ongoing, Progressing  Goal: Patient-Specific Goal (Individualized)  Outcome: Ongoing, Progressing  Goal: Absence of Hospital-Acquired Illness or Injury  Outcome: Ongoing, Progressing  Intervention: Identify and Manage Fall Risk  Recent Flowsheet Documentation  Taken 7/18/2021 0200 by Paulo White, RNA  Safety Promotion/Fall Prevention:   activity supervised   assistive device/personal items within reach   clutter free environment maintained   fall prevention program maintained   lighting adjusted   nonskid shoes/slippers when out of bed   room organization consistent   safety round/check completed  Taken 7/18/2021 0000 by Paulo White, RNA  Safety Promotion/Fall Prevention:   activity supervised   assistive device/personal items within reach   clutter free environment maintained   fall prevention program maintained   lighting adjusted   nonskid shoes/slippers when out of bed   room organization consistent   safety round/check completed  Taken 7/17/2021 2200 by Paulo White, RNA  Safety Promotion/Fall Prevention:   activity supervised   assistive device/personal items within reach   clutter free environment maintained   fall prevention program maintained   lighting adjusted   nonskid shoes/slippers when out of bed   room organization consistent   safety round/check completed  Taken 7/17/2021 2000 by Paulo White, RNA  Safety Promotion/Fall Prevention:   activity supervised   assistive device/personal items within reach   clutter free environment maintained   fall prevention program  maintained   lighting adjusted   muscle strengthening facilitated   nonskid shoes/slippers when out of bed   room organization consistent   safety round/check completed  Intervention: Prevent Skin Injury  Recent Flowsheet Documentation  Taken 7/18/2021 0200 by Paulo White RNA  Body Position: position changed independently  Taken 7/18/2021 0000 by Paulo White RNA  Body Position: position changed independently  Taken 7/17/2021 2200 by Paulo White RNA  Body Position: position changed independently  Taken 7/17/2021 2000 by Paulo White RNA  Body Position: position changed independently  Skin Protection:   adhesive use limited   incontinence pads utilized  Intervention: Prevent Infection  Recent Flowsheet Documentation  Taken 7/17/2021 2000 by Paulo White RNA  Infection Prevention: single patient room provided  Goal: Optimal Comfort and Wellbeing  Outcome: Ongoing, Progressing  Intervention: Provide Person-Centered Care  Recent Flowsheet Documentation  Taken 7/17/2021 2000 by Paulo White RNA  Trust Relationship/Rapport: care explained  Goal: Readiness for Transition of Care  Outcome: Ongoing, Progressing     Problem: Skin Injury Risk Increased  Goal: Skin Health and Integrity  Outcome: Ongoing, Progressing  Intervention: Optimize Skin Protection  Recent Flowsheet Documentation  Taken 7/18/2021 0200 by Paulo White RNA  Head of Bed (HOB): HOB at 20-30 degrees  Taken 7/18/2021 0000 by Paulo White RNA  Head of Bed (HOB): HOB at 20-30 degrees  Taken 7/17/2021 2200 by Paulo White RNA  Head of Bed (HOB): HOB at 20-30 degrees  Taken 7/17/2021 2000 by Paulo White, RNA  Pressure Reduction Techniques: frequent weight shift encouraged  Head of Bed (HOB): HOB at 20-30 degrees  Pressure Reduction Devices: positioning supports utilized  Skin Protection:   adhesive use limited   incontinence pads utilized     Problem: Pain Acute  Goal:  Optimal Pain Control  Outcome: Ongoing, Progressing  Intervention: Develop Pain Management Plan  Recent Flowsheet Documentation  Taken 7/18/2021 0000 by Paulo White RNA  Pain Management Interventions: see MAR  Taken 7/17/2021 1937 by White, Paulo D, RNA  Pain Management Interventions: see MAR  Intervention: Optimize Psychosocial Wellbeing  Recent Flowsheet Documentation  Taken 7/17/2021 2000 by Paulo White, RNA  Supportive Measures: active listening utilized  Diversional Activities: television     Problem: Oral Intake Inadequate  Goal: Improved Oral Intake  Outcome: Ongoing, Progressing   Goal Outcome Evaluation:

## 2021-07-18 NOTE — PLAN OF CARE
Goal Outcome Evaluation:  Plan of Care Reviewed With: patient           Outcome Summary: PT evaluation completed.  Pt stood with SBA and ambulated 300 feet using rw with CGAx1 - 2 prolonged standing rest breaks needed d/t fatigue.  Pt demonstrates significant weakness of hip flexors and quads bilaterally.  Skilled PT services warranted to improve mobility and safety.  Recommend home with outpt PT at d/c.

## 2021-07-18 NOTE — THERAPY EVALUATION
Patient Name: Abhijit Aldrich  : 1950    MRN: [id number]                              Today's Date: 2021       Admit Date: 2021    Visit Dx:     ICD-10-CM ICD-9-CM   1. Idiopathic acute pancreatitis without infection or necrosis  K85.00 577.0     Patient Active Problem List   Diagnosis   • Acute pancreatitis   • Cirrhosis (CMS/HCC)   • CAD (coronary artery disease)   • Hypertension   • PVD (peripheral vascular disease) (CMS/HCC)   • Severe protein-calorie malnutrition (CMS/HCC)   • Anemia   • Weight loss   • Numbness of right anterior thigh     Past Medical History:   Diagnosis Date   • CAD (coronary artery disease)    • Cirrhosis (CMS/HCC)    • Hypertension    • PVD (peripheral vascular disease) (CMS/HCC)      Past Surgical History:   Procedure Laterality Date   • ABDOMINAL AORTIC ANEURYSM REPAIR     • AORTA BIFEMORAL BYPASS       General Information     Row Name 21 1323          Physical Therapy Time and Intention    Document Type  evaluation  -LM     Mode of Treatment  individual therapy;physical therapy  -LM     Row Name 21 1323          General Information    Patient Profile Reviewed  yes  -LM     Prior Level of Function  independent:;all household mobility;gait;ADL's Uses a rollator for community mobility  -LM     Existing Precautions/Restrictions  fall;seizures  -LM     Barriers to Rehab  none identified  -LM     Row Name 21 1323          Living Environment    Lives With  alone Daughter lives in a mobile home on the patients property  -LM     Row Name 21 1323          Home Main Entrance    Number of Stairs, Main Entrance  one  -LM     Stair Railings, Main Entrance  none  -LM     Row Name 21 1323          Stairs Within Home, Primary    Number of Stairs, Within Home, Primary  none  -LM     Row Name 21 1323          Cognition    Orientation Status (Cognition)  oriented x 4  -LM     Row Name 21 1323          Safety Issues, Functional Mobility     Impairments Affecting Function (Mobility)  balance;endurance/activity tolerance;pain;strength  -LM       User Key  (r) = Recorded By, (t) = Taken By, (c) = Cosigned By    Initials Name Provider Type    Omaira Amin PT Physical Therapist        Mobility     Row Name 07/18/21 1323          Bed Mobility    Comment (Bed Mobility)  Pt sitting up in chair at initial and end of eval.  -LM     Row Name 07/18/21 1323          Sit-Stand Transfer    Sit-Stand Worth (Transfers)  standby assist  -LM     Assistive Device (Sit-Stand Transfers)  walker, front-wheeled  -LM     Row Name 07/18/21 1323          Gait/Stairs (Locomotion)    Worth Level (Gait)  contact guard;1 person assist  -LM     Assistive Device (Gait)  walker, front-wheeled  -LM     Distance in Feet (Gait)  300 feet  -LM     Deviations/Abnormal Patterns (Gait)  mara decreased;gait speed decreased  -LM     Bilateral Gait Deviations  forward flexed posture  -LM     Comment (Gait/Stairs)  2 prolonged standing rest breaks needed d/t fatigue.  Vc's for upright posture, but pt noted to have back pain causing increased difficulty standing fully upright.  -LM       User Key  (r) = Recorded By, (t) = Taken By, (c) = Cosigned By    Initials Name Provider Type    Omaira Amin PT Physical Therapist        Obj/Interventions     Row Name 07/18/21 1323          Range of Motion Comprehensive    General Range of Motion  bilateral lower extremity ROM WFL  -LM     Row Name 07/18/21 1323          Strength Comprehensive (MMT)    General Manual Muscle Testing (MMT) Assessment  lower extremity strength deficits identified  -LM     Comment, General Manual Muscle Testing (MMT) Assessment  BLEs - Hip flex - 3+/5; Knee ext - 3+/5; Knee flex - 4+/5; Ankle DF - 4+/5  -LM     Row Name 07/18/21 1323          Balance    Balance Assessment  sitting static balance;standing static balance;standing dynamic balance  -LM     Static Sitting Balance  WFL;sitting in chair  -LM      Static Standing Balance  WFL;supported  -LM     Dynamic Standing Balance  mild impairment;supported  -LM     Row Name 07/18/21 1323          Sensory Assessment (Somatosensory)    Bilateral LE Sensory Assessment  impaired Pt able to feel light touch but states it doesn't feel normal  -LM       User Key  (r) = Recorded By, (t) = Taken By, (c) = Cosigned By    Initials Name Provider Type    LM Omaira Dorado, PT Physical Therapist        Goals/Plan     Row Name 07/18/21 1323          Bed Mobility Goal 1 (PT)    Activity/Assistive Device (Bed Mobility Goal 1, PT)  sit to supine/supine to sit  -LM     [place]ugio Level/Cues Needed (Bed Mobility Goal 1, PT)  independent  -LM     Time Frame (Bed Mobility Goal 1, PT)  long term goal (LTG);10 days  -LM     [facility] Name 07/18/21 1323          Transfer Goal 1 (PT)    Activity/Assistive Device (Transfer Goal 1, PT)  bed-to-chair/chair-to-bed  -LM     [place]ugio Level/Cues Needed (Transfer Goal 1, PT)  independent;modified independence  -LM     Time Frame (Transfer Goal 1, PT)  long term goal (LTG);10 days  -LM     [facility] Name 07/18/21 1323          Gait Training Goal 1 (PT)    Activity/Assistive Device (Gait Training Goal 1, PT)  gait (walking locomotion);assistive device use  -LM     [place] Level (Gait Training Goal 1, PT)  independent;modified independence  -LM     Distance (Gait Training Goal 1, PT)  300 feet  -LM     Time Frame (Gait Training Goal 1, PT)  long term goal (LTG);10 days  -LM       User Key  (r) = Recorded By, (t) = Taken By, (c) = Cosigned By    Initials Name Provider Type    LM Omaira Dorado, PT Physical Therapist        Clinical Impression     Row Name 07/18/21 1323          Pain    Additional Documentation  Pain Scale: Numbers Pre/Post-Treatment (Group)  -LM     Row Name 07/18/21 1323          Pain Scale: Numbers Pre/Post-Treatment    Pretreatment Pain Rating  8/10  -LM     Posttreatment Pain Rating  9/10  -LM     Pain Location - Side  Right  -LM     Pain  Location  abdomen;back;flank  -LM     Pain Intervention(s)  Repositioned;Ambulation/increased activity  -LM     Row Name 07/18/21 1323          Plan of Care Review    Plan of Care Reviewed With  patient  -LM     Outcome Summary  PT evaluation completed.  Pt stood with SBA and ambulated 300 feet using rw with CGAx1 - 2 prolonged standing rest breaks needed d/t fatigue.  Pt demonstrates significant weakness of hip flexors and quads bilaterally.  Skilled PT services warranted to improve mobility and safety.  Recommend home with outpt PT at d/c.  -LM     Row Name 07/18/21 1323          Therapy Assessment/Plan (PT)    Rehab Potential (PT)  good, to achieve stated therapy goals  -LM     Criteria for Skilled Interventions Met (PT)  yes;meets criteria;skilled treatment is necessary  -LM     Row Name 07/18/21 1323          Vital Signs    Pre Systolic BP Rehab  125  -LM     Pre Treatment Diastolic BP  77  -LM     Pretreatment Heart Rate (beats/min)  90  -LM     Posttreatment Heart Rate (beats/min)  83  -LM     Pre Patient Position  Sitting  -LM     Post Patient Position  Sitting  -LM     Row Name 07/18/21 1323          Positioning and Restraints    Pre-Treatment Position  sitting in chair/recliner  -LM     Post Treatment Position  chair  -LM     In Chair  reclined;call light within reach;encouraged to call for assist;notified nsg  -LM       User Key  (r) = Recorded By, (t) = Taken By, (c) = Cosigned By    Initials Name Provider Type    LM Omaira Dorado, PT Physical Therapist        Outcome Measures     Row Name 07/18/21 1323          How much help from another person do you currently need...    Turning from your back to your side while in flat bed without using bedrails?  4  -LM     Moving from lying on back to sitting on the side of a flat bed without bedrails?  3  -LM     Moving to and from a bed to a chair (including a wheelchair)?  3  -LM     Standing up from a chair using your arms (e.g., wheelchair, bedside chair)?  3   -LM     Climbing 3-5 steps with a railing?  2  -LM     To walk in hospital room?  3  -LM     AM-PAC 6 Clicks Score (PT)  18  -LM     Row Name 07/18/21 1323          Functional Assessment    Outcome Measure Options  AM-PAC 6 Clicks Basic Mobility (PT)  -       User Key  (r) = Recorded By, (t) = Taken By, (c) = Cosigned By    Initials Name Provider Type    LM Omaira Dorado, BIJAL Physical Therapist        Physical Therapy Education                 Title: PT OT SLP Therapies (In Progress)     Topic: Physical Therapy (In Progress)     Point: Mobility training (Done)     Learning Progress Summary           Patient Acceptance, E, VU by  at 7/18/2021 1405                   Point: Home exercise program (Not Started)     Learner Progress:  Not documented in this visit.          Point: Precautions (Done)     Learning Progress Summary           Patient Acceptance, E, VU by  at 7/18/2021 1405                               User Key     Initials Effective Dates Name Provider Type Discipline     06/16/21 -  Omaira Dorado, BIJAL Physical Therapist PT              PT Recommendation and Plan  Planned Therapy Interventions (PT): balance training, bed mobility training, gait training, home exercise program, motor coordination training, neuromuscular re-education, patient/family education, postural re-education, ROM (range of motion), stair training, strengthening, stretching, transfer training  Plan of Care Reviewed With: patient  Outcome Summary: PT evaluation completed.  Pt stood with SBA and ambulated 300 feet using rw with CGAx1 - 2 prolonged standing rest breaks needed d/t fatigue.  Pt demonstrates significant weakness of hip flexors and quads bilaterally.  Skilled PT services warranted to improve mobility and safety.  Recommend home with outpt PT at d/c.     Time Calculation:   PT Charges     Row Name 07/18/21 1323             Time Calculation    Start Time  1323  -LM      PT Received On  07/18/21  -      PT Goal Re-Cert Due  Date  07/28/21  -LM         Untimed Charges    PT Eval/Re-eval Minutes  48  -LM         Total Minutes    Untimed Charges Total Minutes  48  -LM       Total Minutes  48  -LM        User Key  (r) = Recorded By, (t) = Taken By, (c) = Cosigned By    Initials Name Provider Type    LM Omaira Dorado, PT Physical Therapist        Therapy Charges for Today     Code Description Service Date Service Provider Modifiers Qty    [id number] HC PT EVAL LOW COMPLEXITY 4 7/18/2021 Omaira Dorado, PT GP 1          PT G-Codes  Outcome Measure Options: AM-PAC 6 Clicks Basic Mobility (PT)  AM-PAC 6 Clicks Score (PT): 18  AM-PAC 6 Clicks Score (OT): 24    Omaira Dorado PT  7/18/2021

## 2021-07-19 ENCOUNTER — APPOINTMENT (OUTPATIENT)
Dept: CT IMAGING | Facility: HOSPITAL | Age: 71
End: 2021-07-19

## 2021-07-19 LAB
ALBUMIN SERPL-MCNC: 2.9 G/DL (ref 3.5–5.2)
ALBUMIN/GLOB SERPL: 1.3 G/DL
ALP SERPL-CCNC: 138 U/L (ref 39–117)
ALT SERPL W P-5'-P-CCNC: 23 U/L (ref 1–41)
ANION GAP SERPL CALCULATED.3IONS-SCNC: 9 MMOL/L (ref 5–15)
APPEARANCE FLD: ABNORMAL
AST SERPL-CCNC: 31 U/L (ref 1–40)
BASOPHILS # BLD AUTO: 0.01 10*3/MM3 (ref 0–0.2)
BASOPHILS NFR BLD AUTO: 0.2 % (ref 0–1.5)
BILIRUB SERPL-MCNC: 0.4 MG/DL (ref 0–1.2)
BUN SERPL-MCNC: 10 MG/DL (ref 8–23)
BUN/CREAT SERPL: 19.6 (ref 7–25)
CALCIUM SPEC-SCNC: 8.1 MG/DL (ref 8.6–10.5)
CHLORIDE SERPL-SCNC: 101 MMOL/L (ref 98–107)
CO2 SERPL-SCNC: 25 MMOL/L (ref 22–29)
COLOR FLD: YELLOW
CREAT SERPL-MCNC: 0.51 MG/DL (ref 0.76–1.27)
DEPRECATED RDW RBC AUTO: 48.1 FL (ref 37–54)
EOSINOPHIL # BLD AUTO: 0.01 10*3/MM3 (ref 0–0.4)
EOSINOPHIL NFR BLD AUTO: 0.2 % (ref 0.3–6.2)
ERYTHROCYTE [DISTWIDTH] IN BLOOD BY AUTOMATED COUNT: 13.4 % (ref 12.3–15.4)
GFR SERPL CREATININE-BSD FRML MDRD: >150 ML/MIN/1.73
GLOBULIN UR ELPH-MCNC: 2.3 GM/DL
GLUCOSE SERPL-MCNC: 95 MG/DL (ref 65–99)
HCT VFR BLD AUTO: 36.3 % (ref 37.5–51)
HGB BLD-MCNC: 12.2 G/DL (ref 13–17.7)
IMM GRANULOCYTES # BLD AUTO: 0.01 10*3/MM3 (ref 0–0.05)
IMM GRANULOCYTES NFR BLD AUTO: 0.2 % (ref 0–0.5)
LYMPHOCYTES # BLD AUTO: 1.26 10*3/MM3 (ref 0.7–3.1)
LYMPHOCYTES NFR BLD AUTO: 21.7 % (ref 19.6–45.3)
LYMPHOCYTES NFR FLD MANUAL: 42 %
MACROPHAGE FLUID: 19 %
MCH RBC QN AUTO: 32.7 PG (ref 26.6–33)
MCHC RBC AUTO-ENTMCNC: 33.6 G/DL (ref 31.5–35.7)
MCV RBC AUTO: 97.3 FL (ref 79–97)
MESOTHL CELL NFR FLD MANUAL: 23 %
MONOCYTES # BLD AUTO: 0.35 10*3/MM3 (ref 0.1–0.9)
MONOCYTES NFR BLD AUTO: 6 % (ref 5–12)
MONOCYTES NFR FLD: 2 %
NEUTROPHILS NFR BLD AUTO: 4.17 10*3/MM3 (ref 1.7–7)
NEUTROPHILS NFR BLD AUTO: 71.7 % (ref 42.7–76)
NEUTROPHILS NFR FLD MANUAL: 14 %
NRBC BLD AUTO-RTO: 0 /100 WBC (ref 0–0.2)
PLATELET # BLD AUTO: 226 10*3/MM3 (ref 140–450)
PMV BLD AUTO: 9.9 FL (ref 6–12)
POTASSIUM SERPL-SCNC: 3.4 MMOL/L (ref 3.5–5.2)
PROT SERPL-MCNC: 5.2 G/DL (ref 6–8.5)
QT INTERVAL: 456 MS
QTC INTERVAL: 495 MS
RBC # BLD AUTO: 3.73 10*6/MM3 (ref 4.14–5.8)
RBC # FLD AUTO: <2000 /MM3
SODIUM SERPL-SCNC: 135 MMOL/L (ref 136–145)
WBC # BLD AUTO: 5.81 10*3/MM3 (ref 3.4–10.8)
WBC # FLD AUTO: 546 /MM3

## 2021-07-19 PROCEDURE — 25010000002 HYDROMORPHONE PER 4 MG: Performed by: INTERNAL MEDICINE

## 2021-07-19 PROCEDURE — 75989 ABSCESS DRAINAGE UNDER X-RAY: CPT

## 2021-07-19 PROCEDURE — 88305 TISSUE EXAM BY PATHOLOGIST: CPT | Performed by: INTERNAL MEDICINE

## 2021-07-19 PROCEDURE — 99233 SBSQ HOSP IP/OBS HIGH 50: CPT | Performed by: INTERNAL MEDICINE

## 2021-07-19 PROCEDURE — 25010000002 HEPARIN (PORCINE) PER 1000 UNITS: Performed by: INTERNAL MEDICINE

## 2021-07-19 PROCEDURE — 87205 SMEAR GRAM STAIN: CPT | Performed by: INTERNAL MEDICINE

## 2021-07-19 PROCEDURE — 88112 CYTOPATH CELL ENHANCE TECH: CPT | Performed by: INTERNAL MEDICINE

## 2021-07-19 PROCEDURE — 89051 BODY FLUID CELL COUNT: CPT | Performed by: INTERNAL MEDICINE

## 2021-07-19 PROCEDURE — 0W9G3ZZ DRAINAGE OF PERITONEAL CAVITY, PERCUTANEOUS APPROACH: ICD-10-PCS | Performed by: RADIOLOGY

## 2021-07-19 PROCEDURE — 25010000002 MORPHINE PER 10 MG: Performed by: INTERNAL MEDICINE

## 2021-07-19 PROCEDURE — 25010000003 LIDOCAINE 1 % SOLUTION: Performed by: RADIOLOGY

## 2021-07-19 PROCEDURE — 87015 SPECIMEN INFECT AGNT CONCNTJ: CPT | Performed by: INTERNAL MEDICINE

## 2021-07-19 PROCEDURE — 87070 CULTURE OTHR SPECIMN AEROBIC: CPT | Performed by: INTERNAL MEDICINE

## 2021-07-19 PROCEDURE — 80053 COMPREHEN METABOLIC PANEL: CPT | Performed by: INTERNAL MEDICINE

## 2021-07-19 PROCEDURE — 85025 COMPLETE CBC W/AUTO DIFF WBC: CPT | Performed by: INTERNAL MEDICINE

## 2021-07-19 PROCEDURE — 99232 SBSQ HOSP IP/OBS MODERATE 35: CPT | Performed by: NURSE PRACTITIONER

## 2021-07-19 PROCEDURE — 49083 ABD PARACENTESIS W/IMAGING: CPT | Performed by: RADIOLOGY

## 2021-07-19 PROCEDURE — 25010000002 THIAMINE PER 100 MG: Performed by: INTERNAL MEDICINE

## 2021-07-19 PROCEDURE — C1729 CATH, DRAINAGE: HCPCS

## 2021-07-19 RX ORDER — SODIUM CHLORIDE, SODIUM LACTATE, POTASSIUM CHLORIDE, CALCIUM CHLORIDE 600; 310; 30; 20 MG/100ML; MG/100ML; MG/100ML; MG/100ML
30 INJECTION, SOLUTION INTRAVENOUS CONTINUOUS PRN
Status: CANCELLED | OUTPATIENT
Start: 2021-07-19

## 2021-07-19 RX ORDER — HYDROMORPHONE HYDROCHLORIDE 1 MG/ML
0.5 INJECTION, SOLUTION INTRAMUSCULAR; INTRAVENOUS; SUBCUTANEOUS
Status: DISCONTINUED | OUTPATIENT
Start: 2021-07-19 | End: 2021-07-21

## 2021-07-19 RX ORDER — MIRTAZAPINE 15 MG/1
15 TABLET, FILM COATED ORAL NIGHTLY
Status: DISCONTINUED | OUTPATIENT
Start: 2021-07-19 | End: 2021-07-24 | Stop reason: HOSPADM

## 2021-07-19 RX ORDER — ALBUMIN (HUMAN) 12.5 G/50ML
50 SOLUTION INTRAVENOUS ONCE
Status: CANCELLED | OUTPATIENT
Start: 2021-07-19

## 2021-07-19 RX ORDER — LIDOCAINE HYDROCHLORIDE 10 MG/ML
20 INJECTION, SOLUTION INFILTRATION; PERINEURAL ONCE
Status: COMPLETED | OUTPATIENT
Start: 2021-07-19 | End: 2021-07-19

## 2021-07-19 RX ADMIN — HYDROMORPHONE HYDROCHLORIDE 0.5 MG: 1 INJECTION, SOLUTION INTRAMUSCULAR; INTRAVENOUS; SUBCUTANEOUS at 22:43

## 2021-07-19 RX ADMIN — HYDROMORPHONE HYDROCHLORIDE 0.5 MG: 1 INJECTION, SOLUTION INTRAMUSCULAR; INTRAVENOUS; SUBCUTANEOUS at 13:44

## 2021-07-19 RX ADMIN — PANCRELIPASE 24000 UNITS OF LIPASE: 24000; 76000; 120000 CAPSULE, DELAYED RELEASE PELLETS ORAL at 17:06

## 2021-07-19 RX ADMIN — MORPHINE SULFATE 3 MG: 4 INJECTION, SOLUTION INTRAMUSCULAR; INTRAVENOUS at 02:01

## 2021-07-19 RX ADMIN — OXYCODONE HYDROCHLORIDE AND ACETAMINOPHEN 1 TABLET: 5; 325 TABLET ORAL at 19:46

## 2021-07-19 RX ADMIN — MORPHINE SULFATE 3 MG: 4 INJECTION, SOLUTION INTRAMUSCULAR; INTRAVENOUS at 05:02

## 2021-07-19 RX ADMIN — MORPHINE SULFATE 3 MG: 4 INJECTION, SOLUTION INTRAMUSCULAR; INTRAVENOUS at 08:00

## 2021-07-19 RX ADMIN — THIAMINE HYDROCHLORIDE 100 ML/HR: 100 INJECTION, SOLUTION INTRAMUSCULAR; INTRAVENOUS at 08:00

## 2021-07-19 RX ADMIN — SODIUM CHLORIDE, PRESERVATIVE FREE 10 ML: 5 INJECTION INTRAVENOUS at 08:01

## 2021-07-19 RX ADMIN — DEXTROSE AND SODIUM CHLORIDE 100 ML/HR: 5; 450 INJECTION, SOLUTION INTRAVENOUS at 20:01

## 2021-07-19 RX ADMIN — ASPIRIN 81 MG CHEWABLE TABLET 81 MG: 81 TABLET CHEWABLE at 08:00

## 2021-07-19 RX ADMIN — DEXTROSE AND SODIUM CHLORIDE 100 ML/HR: 5; 450 INJECTION, SOLUTION INTRAVENOUS at 05:02

## 2021-07-19 RX ADMIN — HYDROMORPHONE HYDROCHLORIDE 0.5 MG: 1 INJECTION, SOLUTION INTRAMUSCULAR; INTRAVENOUS; SUBCUTANEOUS at 17:09

## 2021-07-19 RX ADMIN — HYDROMORPHONE HYDROCHLORIDE 0.5 MG: 1 INJECTION, SOLUTION INTRAMUSCULAR; INTRAVENOUS; SUBCUTANEOUS at 10:22

## 2021-07-19 RX ADMIN — HEPARIN SODIUM 5000 UNITS: 5000 INJECTION INTRAVENOUS; SUBCUTANEOUS at 05:03

## 2021-07-19 RX ADMIN — PANCRELIPASE 24000 UNITS OF LIPASE: 24000; 76000; 120000 CAPSULE, DELAYED RELEASE PELLETS ORAL at 08:00

## 2021-07-19 RX ADMIN — SODIUM CHLORIDE, PRESERVATIVE FREE 10 ML: 5 INJECTION INTRAVENOUS at 22:44

## 2021-07-19 RX ADMIN — OXYCODONE HYDROCHLORIDE AND ACETAMINOPHEN 1 TABLET: 5; 325 TABLET ORAL at 11:28

## 2021-07-19 RX ADMIN — PANCRELIPASE 24000 UNITS OF LIPASE: 24000; 76000; 120000 CAPSULE, DELAYED RELEASE PELLETS ORAL at 11:28

## 2021-07-19 RX ADMIN — MIRTAZAPINE 15 MG: 15 TABLET, FILM COATED ORAL at 20:00

## 2021-07-19 RX ADMIN — LIDOCAINE HYDROCHLORIDE 20 ML: 10 INJECTION, SOLUTION INFILTRATION; PERINEURAL at 15:24

## 2021-07-19 RX ADMIN — OXYCODONE HYDROCHLORIDE AND ACETAMINOPHEN 1 TABLET: 5; 325 TABLET ORAL at 05:01

## 2021-07-19 NOTE — RESEARCH
Clinical Indication:  72 year-old male with history of chronic pancreatitis and large amount of ascites.     Procedure:  CT guided paracentesis     :  Dr. Felix     Complication:  None apparent.     Technique:  The right mid-abdominal region was prepped and draped in the usual/sterile fashion.  Local anesthesia with Lidocaine was performed.  Utilizing CT localization, a 8.5 Tog[place] drainage catheter placed into ascitic fluid centered in the right paracolic gutter.  A sample fluid was sent for laboratory analysis, per the referring service.  Approximately 2500 cc's of ascitic fluid was drained off.  The drainage catheter was removed and a sterile dressing was applied to the access site.  The patient tolerated the procedure well without complication.     Impression:  Technically successful CT guided paracentesis.

## 2021-07-19 NOTE — PLAN OF CARE
Goal Outcome Evaluation:              Outcome Summary: pt had paracentesis today. Right quadrant paracentesis site started bleeding with moderate amount of blood. changed bandaid. pt refused heparin SQ. pt signed consent for ERCP tomorrow, aware of NPO after MN.

## 2021-07-19 NOTE — PLAN OF CARE
Pt remained in chair until around 0200 in [facility]. Patient has difficulty sleeping because of abdominal pain. Pt abdomen is distended and pt complains of constant aching pain. Pt takes morphine q 3 for pain and percocet q 6 for pain. Pt spirits are still high and is very grateful for his care. Telemetry is sinus arrhythmia with frequent PAC. VSS. Dressing on pt back remains clean dry and intact. Patient is up ad terrence, independent, and continent. Uses urinal. Continue to monitor.   Problem: Adult Inpatient Plan of Care  Goal: Plan of Care Review  Outcome: Ongoing, Progressing  Goal: Patient-Specific Goal (Individualized)  Outcome: Ongoing, Progressing  Goal: Absence of Hospital-Acquired Illness or Injury  Outcome: Ongoing, Progressing  Intervention: Identify and Manage Fall Risk  Recent Flowsheet Documentation  Taken 7/19/2021 0201 by Paulo White, RNA  Safety Promotion/Fall Prevention:   activity supervised   assistive device/personal items within reach   clutter free environment maintained   fall prevention program maintained   lighting adjusted   muscle strengthening facilitated   nonskid shoes/slippers when out of bed   room organization consistent   safety round/check completed  Taken 7/19/2021 0000 by Paulo White, RNA  Safety Promotion/Fall Prevention:   activity supervised   assistive device/personal items within reach   clutter free environment maintained   lighting adjusted   fall prevention program maintained   nonskid shoes/slippers when out of bed   room organization consistent   safety round/check completed  Taken 7/18/2021 2200 by Paulo White, RNA  Safety Promotion/Fall Prevention:   activity supervised   assistive device/personal items within reach   clutter free environment maintained   fall prevention program maintained   lighting adjusted   nonskid shoes/slippers when out of bed   room organization consistent   safety round/check completed  Taken 7/18/2021 2000 by Christopher  Paulo D, RNA  Safety Promotion/Fall Prevention: activity supervised  Intervention: Prevent Skin Injury  Recent Flowsheet Documentation  Taken 7/19/2021 0201 by Paulo White RNA  Body Position: position changed independently  Taken 7/19/2021 0000 by Paulo White RNA  Body Position: position changed independently  Taken 7/18/2021 2200 by Paulo White RNA  Body Position: position changed independently  Taken 7/18/2021 2000 by Paulo White RNA  Body Position: position changed independently  Skin Protection: adhesive use limited  Intervention: Prevent Infection  Recent Flowsheet Documentation  Taken 7/18/2021 2000 by Paulo White RNA  Infection Prevention: single patient room provided  Goal: Optimal Comfort and Wellbeing  Outcome: Ongoing, Progressing  Intervention: Provide Person-Centered Care  Recent Flowsheet Documentation  Taken 7/18/2021 2000 by Paulo White RNA  Trust Relationship/Rapport: care explained  Goal: Readiness for Transition of Care  Outcome: Ongoing, Progressing     Problem: Skin Injury Risk Increased  Goal: Skin Health and Integrity  Outcome: Ongoing, Progressing  Intervention: Optimize Skin Protection  Recent Flowsheet Documentation  Taken 7/19/2021 0201 by Paulo White RNA  Head of Bed (HOB): HOB at 20-30 degrees  Taken 7/19/2021 0000 by Paulo White RNA  Head of Bed (HOB): HOB at 60-90 degrees  Taken 7/18/2021 2200 by Paulo White RNA  Head of Bed (HOB): HOB flat  Taken 7/18/2021 2000 by Paulo White RNA  Head of Bed (HOB): HOB flat  Skin Protection: adhesive use limited     Problem: Pain Acute  Goal: Optimal Pain Control  Outcome: Ongoing, Progressing  Intervention: Develop Pain Management Plan  Recent Flowsheet Documentation  Taken 7/19/2021 0201 by Paulo White RNA  Pain Management Interventions: see MAR  Taken 7/18/2021 2331 by Paulo White RNA  Pain Management Interventions: see  MAR  Taken 7/18/2021 2300 by Paulo White RNA  Pain Management Interventions: see MAR  Taken 7/18/2021 2200 by Paulo White RNA  Pain Management Interventions: pain management plan reviewed with patient/caregiver  Taken 7/18/2021 2000 by Paulo White RNA  Pain Management Interventions: see MAR  Intervention: Optimize Psychosocial Wellbeing  Recent Flowsheet Documentation  Taken 7/18/2021 2000 by Paulo White RNA  Supportive Measures: active listening utilized  Diversional Activities: television     Problem: Oral Intake Inadequate  Goal: Improved Oral Intake  Outcome: Ongoing, Progressing   Goal Outcome Evaluation:

## 2021-07-19 NOTE — CASE MANAGEMENT/SOCIAL WORK
Discharge Planning Assessment  [facility]     Patient Name: Abhijit Aldrich  MRN: [id number]  Today's Date: 7/19/2021    Admit Date: 7/16/2021    Discharge Needs Assessment     Row Name 07/19/21 1606       Living Environment    Lives With  alone    Current Living Arrangements  home/apartment/condo    Primary Care Provided by  self    Provides Primary Care For  no one    Family Caregiver if Needed  child(kay), adult    Family Caregiver Names  Cristina Nolasco(daughter) and Catrachita Castañeda(sister)    Quality of Family Relationships  unable to assess    Able to Return to Prior Arrangements  yes    Living Arrangement Comments  CM met with pt in room with permission regarding discharge plan.  Pt resides [place] in a home alone. He reports his daughter lives right behind him.       Resource/Environmental Concerns    Resource/Environmental Concerns  other (see comments)       Transition Planning    Patient/Family Anticipates Transition to  home with family    Patient/Family Anticipated Services at Transition      Transportation Anticipated  family or friend will provide;car, drives self       Discharge Needs Assessment    Readmission Within the Last 30 Days  no previous admission in last 30 days    Equipment Currently Used at Home  rollator    Concerns to be Addressed  coping/stress;discharge planning;adjustment to diagnosis/illness    Equipment Needed After Discharge  rollator    Discharge Coordination/Progress  Pt reports he has Medicare and [place] Medicaid with prescription coverage. Pt uses [facility] in  [facility]. Pt came to hospital for abd pain radiatting to back and reports weight loss over last year with a diagnosis of pancreatitis.  Plan is home at discharge and daughter can help him as needed. Pt currently denies discharge needs. Pt is independent of ADLs. M will cont to follow.        Discharge Plan     Row Name 07/19/21 1929       Plan    Plan  discharge plan    Plan  Comments  Plan is home with family to assist him as needed. Pt reports he has a good family support and denies any discharge needs at this time. CM will cont to follow.    Final Discharge Disposition Code  01 - home or self-care        Continued Care and Services - Admitted Since 7/16/2021    Coordination has not been started for this encounter.         Demographic Summary     Row Name 07/19/21 2829       General Information    General Information Comments  Pt's PCP is Nellie Ocasio       Contact Information    Permission Granted to Share Info With      Contact Information Obtained for      Contact Information Comments  Cristina Nolasco(daughter):  Catrachita Castañeda(sister);  [phone]        Functional Status    No documentation.       Psychosocial    No documentation.       Abuse/Neglect    No documentation.       Legal    No documentation.       Substance Abuse    No documentation.       Patient Forms    No documentation.           Elyssa Larry RN

## 2021-07-19 NOTE — NURSING NOTE
Image guided paracentesis performed by MD Felix. 2500ml fluid removed from peritoneum. Patient tolerated well. VSS. Report called to nurse.

## 2021-07-19 NOTE — PROGRESS NOTES
"    [facility] Medicine Services  PROGRESS NOTE    Patient Name: Abhijit Aldrich  : 1950  MRN: [id number]    Date of Admission: 2021  Primary Care Physician: Nellie Ocasio MD    Subjective   Subjective     CC:  abd pain/weight loss    HPI:  Sitting up in bed. Complaining of increasing pain and wants something different. Reports USN made his abd pain worse. Understands results of MRCP- sad but reports he was \"expecting this\". GI planning ERCP tomorrow.     ROS:  Gen- No fevers, chills  CV- No chest pain, palpitations  Resp- No cough, dyspnea  GI- No N/V/D, +abd pain         Objective   Objective     Vital Signs:   Temp:  [97.3 °F (36.3 °C)-98.1 °F (36.7 °C)] 97.3 °F (36.3 °C)  Heart Rate:  [77-89] 77  Resp:  [18] 18  BP: (112-129)/(75-95) 112/75     Physical Exam:  GEN- pleasant, appears older than stated age and cachetic  HEENT- atraumatic, normocephalic, eomi, poor dentition  NECK- supple, trachea midline, no masses  RESP: ctab, normal effort  CV: no murmurs, s1/s2, rrr  GI: soft, epigastric TTP , more distended today , more TTP today  MSK: no edema noted, spontaneous movement of all extremities  NEURO: alert, oriented, no focal deficits  SKIN: no rashes  PSYCH: appropriate mood and affect       Results Reviewed:  LAB RESULTS:      Lab 21  0432 21  0546 21  0604 21  2153 21  1721   WBC 5.81 6.23 5.56  --  8.35   HEMOGLOBIN 12.2* 13.2 12.6*  --  12.6*   HEMATOCRIT 36.3* 38.8 36.9*  --  36.9*   PLATELETS 226 257 226  --  249   NEUTROS ABS 4.17 4.51 4.07  --  6.09   IMMATURE GRANS (ABS) 0.01 0.02 0.02  --  0.02   LYMPHS ABS 1.26 1.22 1.03  --  1.75   MONOS ABS 0.35 0.44 0.37  --  0.45   EOS ABS 0.01 0.02 0.05  --  0.02   MCV 97.3* 96.3 97.6*  --  96.9   LACTATE  --   --   --   --  1.5   PROTIME  --   --  14.2 14.1  --          Lab 21  0432 21  0546 21  0604 21  1721   SODIUM 135* 133* 133* 133*   POTASSIUM 3.4* 3.6 3.6 " 4.1   CHLORIDE 101 97* 96* 92*   CO2 25.0 26.0 27.0 30.0*   ANION GAP 9.0 10.0 10.0 11.0   BUN 10 14 20 23   CREATININE 0.51* 0.56* 0.65* 0.84   GLUCOSE 95 104* 98 105*   CALCIUM 8.1* 8.3* 8.0* 8.6   PHOSPHORUS  --   --  3.0  --    TSH  --   --  1.400  --          Lab 07/19/21  0432 07/18/21  0546 07/16/21  1721   TOTAL PROTEIN 5.2* 5.5* 6.3   ALBUMIN 2.90* 3.00* 3.40*   GLOBULIN 2.3 2.5 2.9   ALT (SGPT) 23 24 28   AST (SGOT) 31 35 36   BILIRUBIN 0.4 0.5 0.4   ALK PHOS 138* 147* 157*   LIPASE  --   --  706*         Lab 07/17/21  0604 07/16/21  2153   PROTIME 14.2 14.1   INR 1.13 1.12         Lab 07/16/21  1721   CHOLESTEROL 118  115   LDL CHOL 53  51   HDL CHOL 30*  30*   TRIGLYCERIDES 214*  212*         Lab 07/17/21  0604   IRON 75   IRON SATURATION 28   TIBC 271*   TRANSFERRIN 182*   FERRITIN 162.40   FOLATE >20.00   VITAMIN B 12 704         Brief Urine Lab Results  (Last result in the past 365 days)      Color   Clarity   Blood   Leuk Est   Nitrite   Protein   CREAT   Urine HCG        07/16/21 1946 Yellow Clear Negative Negative Negative Negative               Microbiology Results Abnormal     Procedure Component Value - Date/Time    COVID-19 and FLU A/B PCR - Swab, Nasopharynx [[id number]]  (Normal) Collected: 07/16/21 1934    Lab Status: Final result Specimen: Swab from Nasopharynx Updated: 07/16/21 2012     COVID19 Not Detected     Influenza A PCR Not Detected     Influenza B PCR Not Detected    Narrative:      Fact sheet for providers: https://www.fda.gov/media/319913/download    Fact sheet for patients: https://www.fda.gov/media/034918/download    Test performed by PCR.          Adult Transthoracic Echo Complete w/ Color, Spectral and Contrast if necessary per protocol    Result Date: 7/17/2021  · The study is technically difficult for diagnosis with poor acoustic windows. The quality of the study is extremely limited due to limited views obtained, patient body habitus and patient positioning. · Left  ventricular ejection fraction appears to be 51 - 55%. Left ventricular systolic function is normal. · There is calcification of the aortic valve. · Mild aortic valve regurgitation is present. · Mild mitral valve regurgitation is present.      US Gallbladder    Result Date: 7/18/2021  EXAMINATION: US GALLBLADDER-  INDICATION: eval for stones; K85.00-Idiopathic acute pancreatitis without necrosis or infection  COMPARISON: NONE  FINDINGS: Sonographic grayscale and color Doppler evaluation of the right upper quadrant demonstrates  Increased echogenicity and nodular liver surface contour compatible with parenchymal disease and cirrhosis. No suspicious focal hepatic lesions or biliary ductal dilatation. Cholelithiasis is present with some small stones and sludge, without definite wall thickening. Unremarkable 5 mm common bile duct.  Right kidney measures 9.7 cm in length, without apparent mass or hydronephrosis. Normal color Doppler flow.      Impression: Redemonstrated moderate to large volume ascites and abnormal appearance of the hepatic parenchyma compatible with parenchymal disease if not kartik cirrhosis.  Cholelithiasis is present without definite changes of cholecystitis.  This report was finalized on 7/18/2021 7:52 AM by Apollo Winters.      MRI abdomen w wo contrast mrcp    Result Date: 7/19/2021  EXAMINATION: MRI ABDOMEN W WO CONTRAST MRCP-  INDICATION: Concerns for pancreatic head mass with PD dilation; K85.00-Idiopathic acute pancreatitis without necrosis or infection.  TECHNIQUE: Multiplanar MRI of the abdomen with and without intravenous contrast administration. MRCP sequences performed including 3-D T2 space coronal sequence performed for analysis.    COMPARISON: Ultrasound right upper quadrant 07/17/2021 and CT abdomen and pelvis 07/16/2021.  FINDINGS: Lung bases limited in evaluation without gross abnormality or effusion. Liver demonstrates abnormal low signal and heterogeneous appearance remaining  consistent with previously identified parenchymal findings along with perihepatic ascites. Postcontrast sequences in particular arterial phase imaging without arterial hyperenhancement or evidence for washout characteristics at any focal area within the liver. Gallbladder is distended without filling defect clearly evident of cholelithiasis. Intrahepatic dilatation greatest on the left along with irregularities and areas of potential stricturing or narrowing contour in the distal CBD for example series 27 image 29 and 30 without distinct filling defect to suggest choledocholithiasis. No pancreas divisum anatomy although at least mild to moderately enlarged main pancreatic duct for example series 12 image 14 extending to the distal body of the pancreas up to 4 to 5 mm of abnormal increased or dilated caliber with indistinct margins of early arterial phase imaging pancreatic head concerning for infiltrating or underlying mass lesion difficult to measure as this is somewhat indistinct for example series 12 image 15 and series 33 image 53 and ill-defined margins in the peripancreatic region concerning for potential secondary inflammatory process or pancreatitis without focal fluid collection or cystic lesion otherwise evident.  Spleen is unremarkable. Adrenals without distinct nodule. Kidneys without hydronephrosis or hydroureter. No bulky retroperitoneal adenopathy. Patent nonaneurysmal abdominal aorta. Portal veins are poorly visualized on delayed phase sequencing and limited for evaluation. Visualized GI tract unremarkable with motion degradation, however no gross disproportion dilatation with at least small to moderate volume abdominopelvic ascites in the perihepatic and perisplenic regions.         Impression: Abnormal biliary dilatation and irregular contours along with a double duct sign of dilated CBD and main pancreatic duct extending into the distal body and tail of the pancreas concerning for pancreatic head  lesion despite measurable or distinct identifiable mass there is somewhat indistinct margins and signal on early arterial phase imaging with adenocarcinoma of the pancreas of highest consideration given overall appearance with a prominent left-sided intrahepatic biliary dilatation and gallbladder distention. Perisplenic and perihepatic ascites with at least some heterogeneous signal of the liver parenchyma. No splenomegaly is evident and no distinct nodular contours of the hepatic parenchyma to definitively characterize cirrhosis on this examination along with lack of hepatic lesion evident. Ill-defined enhancement better seen on uncinate process and inferior head of the pancreas comparison CT examination from 07/16/2021 series 2 image 36 of around 3 cm focus concerning for pancreatic lesion with tissue sampling considered for further evaluation.  D:  07/18/2021 E:  07/19/2021         Results for orders placed during the hospital encounter of 07/16/21    Adult Transthoracic Echo Complete w/ Color, Spectral and Contrast if necessary per protocol    Interpretation Summary  · The study is technically difficult for diagnosis with poor acoustic windows. The quality of the study is extremely limited due to limited views obtained, patient body habitus and patient positioning.  · Left ventricular ejection fraction appears to be 51 - 55%. Left ventricular systolic function is normal.  · There is calcification of the aortic valve.  · Mild aortic valve regurgitation is present.  · Mild mitral valve regurgitation is present.      I have reviewed the medications:  Scheduled Meds:aspirin, 81 mg, Oral, Daily  heparin (porcine), 5,000 Units, Subcutaneous, Q8H  nicotine, 1 patch, Transdermal, Q24H  pancrelipase (Lip-Prot-Amyl), 24,000 units of lipase, Oral, TID With Meals  sodium chloride, 10 mL, Intravenous, Q12H      Continuous Infusions:dextrose 5 % and sodium chloride 0.45 %, 100 mL/hr, Last Rate: 100 mL/hr (07/19/21  0502)      PRN Meds:.melatonin  •  Morphine **AND** naloxone  •  nicotine polacrilex  •  ondansetron  •  oxyCODONE-acetaminophen  •  Sodium Chloride (PF)  •  sodium chloride    Assessment/Plan   Assessment & Plan     Active Hospital Problems    Diagnosis  POA   • **Acute pancreatitis [K85.90]  Yes   • Cirrhosis (CMS/HCC) [K74.60]  Unknown   • CAD (coronary artery disease) [I25.10]  Unknown   • Hypertension [I10]  Unknown   • PVD (peripheral vascular disease) (CMS/HCC) [I73.9]  Unknown   • Severe protein-calorie malnutrition (CMS/HCC) [E43]  Unknown   • Anemia [D64.9]  Unknown   • Weight loss [R63.4]  Unknown   • Numbness of right anterior thigh [R20.0]  Unknown      Resolved Hospital Problems   No resolved problems to display.        Brief Hospital Course to date:  Abhijit Aldrich is a 71-year-old male patient with a PMH significant for PVD status post aortic femoral bypass (per patient), history of aortic aneurysm status post repair, tobacco abuse, chronic pancreatitis who presents to the ED due to abdominal pain found to have acute pancreatitis.    This patient's problems and plans were partially entered by my partner and updated as appropriate by me 07/19/21.         Acute on chronic pancreatitis  Cirrhosis with ascites and abd varices on CT imaging  Concern for possible malignancy and ?pancreatic head mass   Elevated CA 19-9  -GI consulted, appreciate assistance, MRCP done with abnl biliary dilatation and irregular contour with double duct sign of CBD/main pancreatic duct concerning for pancreatic head lesion  --noted  significantly elevated- concerning for malignancy   -[facility] GB done 7/17 with mod/large volume ascites and evidence of liver cirrhosis  -Morphine as needed for pain, have also added oral percocet per patient request   -Advance diet as tolerated, nutrition consult   -Reports alcohol use remotely but reports none in years  -Continue home Creon  --GI has ordered further lab studies including AFP,  hep A/B serologies- pending  ---GI further recs for ERCP tomorrow with biliary brushings and stent placement, also planning for IR guided paracentesis today given worsening ascites/pain     Severe protein calorie malnutrition  Progressive weight loss  -Mildly decreased albumin  -BMI 14.37  -Banana bag x3 days  -Nutrition consult     Anemia  -Anemia studies  -A.m. labs     Numbness to the anterior thigh  -PT/OT  -Consider imaging to T/L-spine  -No red flag symptoms currently     CAD  PVD  Hypertension  -Hold spironolactone, Lasix while giving IVFs  -Continue home aspirin    DVT prophylaxis:  Medical DVT prophylaxis orders are present.       Disposition: I expect the patient to be discharged pending further GI w/u    CODE STATUS:   Code Status and Medical Interventions:   Ordered at: 07/16/21 9788     Level Of Support Discussed With:    Patient     Code Status:    CPR     Medical Interventions (Level of Support Prior to Arrest):    Full       Maine Dominguez MD  07/19/21

## 2021-07-19 NOTE — PROGRESS NOTES
"GI Daily Progress Note  Subjective:    Chief Complaint: Follow-up pancreatitis    Patient resting in bed in no acute distress.  Notes he is having some abdominal distention and discomfort today and feels like he is feeling with fluid and needs tapped; reports having a prior paracentesis at [place] in [facility] approximately 6 weeks ago.  Does not endorse any nausea, vomiting, diarrhea, chest pain, or fever/chills.  Noted abdominal tenderness and distention.  Discussed MRCP findings with patient.  Reports abdominal tenderness as described above.    Objective:    /75 (BP Location: Right arm, Patient Position: Sitting)   Pulse 77   Temp 97.3 °F (36.3 °C) (Oral)   Resp 18   Ht 180.3 cm (71\")   Wt 52.3 kg (115 lb 6.4 oz)   SpO2 98%   BMI 16.10 kg/m²     Physical Exam  Vitals and nursing note reviewed.   Constitutional:       Appearance: He is cachectic.      Comments: Pleasantly conversant, thin chronically ill-appearing.   HENT:      Head: Normocephalic and atraumatic.   Eyes:      General: No scleral icterus.     Extraocular Movements: Extraocular movements intact.      Pupils: Pupils are equal, round, and reactive to light.   Cardiovascular:      Rate and Rhythm: Normal rate and regular rhythm.      Pulses: Normal pulses.      Heart sounds: Normal heart sounds.   Pulmonary:      Effort: Pulmonary effort is normal. No respiratory distress.      Breath sounds: Normal breath sounds.   Abdominal:      General: Abdomen is flat. Bowel sounds are normal. There is distension.      Palpations: Abdomen is soft. There is no mass.      Tenderness: There is abdominal tenderness. There is no guarding or rebound.      Hernia: No hernia is present.   Skin:     General: Skin is warm and dry.      Capillary Refill: Capillary refill takes less than 2 seconds.      Coloration: Skin is jaundiced and pale.   Neurological:      General: No focal deficit present.      Mental Status: He is alert and oriented to " person, place, and time.   Psychiatric:         Mood and Affect: Mood normal.         Behavior: Behavior normal. Behavior is cooperative.         Thought Content: Thought content normal.         Judgment: Judgment normal.         Lab  I have personally reviewed most recent cardiac tracings, lab results and radiology images and interpretations and agree with findings.    Lab Results   Component Value Date    WBC 5.81 07/19/2021    HGB 12.2 (L) 07/19/2021    HGB 13.2 07/18/2021    HGB 12.6 (L) 07/17/2021    MCV 97.3 (H) 07/19/2021     07/19/2021    INR 1.13 07/17/2021    INR 1.12 07/16/2021       Lab Results   Component Value Date    GLUCOSE 95 07/19/2021    BUN 10 07/19/2021    CREATININE 0.51 (L) 07/19/2021    EGFRIFNONA >150 07/19/2021    BCR 19.6 07/19/2021     (L) 07/19/2021    K 3.4 (L) 07/19/2021    CO2 25.0 07/19/2021    CALCIUM 8.1 (L) 07/19/2021    ALBUMIN 2.90 (L) 07/19/2021    ALKPHOS 138 (H) 07/19/2021    BILITOT 0.4 07/19/2021    ALT 23 07/19/2021    AST 31 07/19/2021     Results for FRANCISCA FABIAN (MRN [id number]) as of 7/19/2021 10:44   Ref. Range 7/17/2021 06:04   ALPHA-FETOPROTEIN Latest Ref Range: 0 - 8.3 ng/mL 1.18   Results for FRANCISCA FABIAN (MRN [id number]) as of 7/19/2021 10:44   Ref. Range 7/18/2021 05:46   CA 19-9 Latest Ref Range: <=35.0 U/mL 328.3 (H)   Results for FRANCISCA FABIAN (MRN [id number]) as of 7/19/2021 10:44   Ref. Range 7/17/2021 18:58   MRI ABDOMEN W WO CONTRAST MRCP Unknown IMPRESSION:  Abnormal biliary dilatation and irregular contours along  with a double duct sign of dilated CBD and main pancreatic duct  extending into the distal body and tail of the pancreas concerning for  pancreatic head lesion despite measurable or distinct identifiable mass  there is somewhat indistinct margins and signal on early arterial phase  imaging with adenocarcinoma of the pancreas of highest consideration  given overall appearance with a prominent left-sided  intrahepatic  biliary dilatation and gallbladder distention. Perisplenic and  perihepatic ascites with at least some heterogeneous signal of the liver  parenchyma. No splenomegaly is evident and no distinct nodular contours  of the hepatic parenchyma to definitively characterize cirrhosis on this  examination along with lack of hepatic lesion evident. Ill-defined  enhancement better seen on uncinate process and inferior head of the  pancreas comparison CT examination from 07/16/2021 series 2 image 36 of  around 3 cm focus concerning for pancreatic lesion with tissue sampling  considered for further evaluation.   Results for FRANCISCA FABIAN (MRN [id number]) as of 7/19/2021 10:44   Ref. Range 7/17/2021 19:23   US GALLBLADDER Unknown IMPRESSION:  Redemonstrated moderate to large volume ascites and abnormal  appearance of the hepatic parenchyma compatible with parenchymal disease  if not kartik cirrhosis.     Cholelithiasis is present without definite changes of cholecystitis.     Assessment:      Acute pancreatitis    Cirrhosis (CMS/HCC)    CAD (coronary artery disease)    Hypertension    PVD (peripheral vascular disease) (CMS/HCC)    Severe protein-calorie malnutrition (CMS/HCC)    Anemia    Weight loss    Numbness of right anterior thigh    ?  Pancreatic head mass on imaging with double duct sign, elevated CA 19-9  Acute on chronic pancreatitis, related to above  Epigastric abdominal pain, related to above  Liver cirrhosis with ascites  Severe protein calorie malnutrition    Plan:  Patient doing okay today but does note some discomfort associated with abdominal distention.  >>> MRCP films reviewed with Dr. Barry of radiology with obvious double duct sign concerning for pancreatic head malignancy.  >>> Recommend ERCP tomorrow with biliary brushings and stent placement.  >>> N.p.o. at midnight  >>> Obtain informed consent for ERCP  >>> Risk and benefits explained including incomplete cannulation, 10%; Perforation, 1/ 500;  Bleeding, 1/500:  Infection; Pancreatitis, 5 to 10% mild to moderate; and 5% severe with 1/1000 risk of death.  >>> Additionally, given worsening abdominal distention and what appears to be symptomatic ascites, recommend IR guided paracentesis today; will send fluid off for cell count, culture, cytology to rule out malignant ascites.  >>> Remeron 15 mg nightly    Trent Caballero, LOWELL  07/19/21  10:41 EDT

## 2021-07-19 NOTE — PROGRESS NOTES
Clinical Nutrition   Reason For Visit: NPO/Clear liquid    Patient Name: Abhijit Aldrich  YOB: 1950  MRN: [id number]  Date of Encounter: 07/19/21 09:42 EDT  Admission date: 7/16/2021      -NPO/Clear Liquids x 3 days during this admission. Receiving Boost Breeze drinks.    -Will order snacks for patient between meals when on solid food diet.    Nutrition Assessment     Admission Problem List:  Abdominal pain/distension  BLE edema  Acute on chronic pancreatitis  Cirrhosis with ascites  Anemia  Severe, Chronic Malnutrition (dx per RD 7/17)      Applicable PMH:  PVD s/p aortic femoral bypass  Aortic aneurysm s/p repair  Tobacco  CAD  HTN  Chronic pancreatitis      Applicable medical tests/procedures since admission:  (7/18) abdominal MRI - results pending      Reported/Observed/Food/Nutrition Related History   7/19) Abdominal MRI results pending along with GOC/POC. Boost Breeze is ordered. Will continue to follow closely and implement interventions as appropriate. Note abdominal pain/distension continue per nsg doc.      7/17) Patient and family members present during visit. Patient reports he has been eating less than normal for quite some time now with resulting unintentional weight loss --- Per H&P the timeframe of this has been 3 years; per GI APRN note the timeframe has been 6-12 months. Exact time frame of decreased PO intake and unintentional weight loss is unclear. Patient states he weighed 126 lbs 7.5 weeks ago and that his UBW was 160 lbs. Has had minimal PO intake during the past 1 week r/t abdominal pain/distension. Was really hungry yesterday and ate a full meal from a fried chicken restaurant. Finally starting to feel like eating. Has tried some of the Boost/Ensure supplements in the past, but has not been drinking any recently. Denies food allergies and difficulty chewing/swallowing.  Agreeable to orange/wildberry Boost Breeze. Agreeable to snacks between meals when appropriate PO diet  ordered - cottage cheese and fruit, sandwich (peanut butter/jelly, roast beef, turkey, ham and cheese).      Anthropometrics   Height: 71 in  Weight: 115 lbs (standing weight 7/19 per ns doc)  BMI: 16.1  BMI classification: Underweight:<18.5kg/m2   IBW: 172 lbs    Per RD note (7/17):  UBW: ~160 lbs per patient; no weight hx in EMR    Weight change: Patient reports unintentional weight loss of 50 lbs - exact timeframe unclear as H&P states x 3 years and GI APRN note states 6-12 months.    Nutrition Focused Physical Exam (7/17)     Subcutaneous fat:  Orbital Severe   Buccal Severe   Tricep Severe   Ribs- thoracic and lumbar region, lower back, midaxillary line Unable to determine at this time     Muscle:  Temple/temporalis Severe   Clavicle/acromion- pectoralis, deltoid Severe   Shoulder- deltoid Severe   Scapula- trapezius, latissimus dorsi Unable to determine at this time   Interosseous- dorsal hand Unable to determine at this time   Thigh- quadriceps Severe   Calf- gastrocnemius Severe       Labs reviewed   Labs reviewed: Yes    Medications reviewed   Medications reviewed: Yes  Pertinent: creon  GTT: 1/2 NS + D5% @ 100 ml/hr    Needs Assessment (7/17)   Height used: 71 in  Weight used: 110 lbs/50 kg (actual wt)    Estimated Calories needs: ~1800 calories daily  30-35 kcal/kg = 1567-9409    Estimated Protein needs: ~88 g protein daily  1.5-2.0 g/kg =     Current Nutrition Prescription   PO: Diet Clear Liquid   Oral Nutrition Supplement: Boost Breeze with meals    Average PO intake: insufficient data    Nutrition Diagnosis     7/17  Problem Malnutrition  (severe, chronic)   Etiology Chronic pancreatitis, abdominal pain/distension, poor appetite   Signs/Symptoms Severe fat loss, severe muscle wasting     7/17, 7/19  Problem Inadequate oral intake   Etiology Chronic pancreatitis, abdominal pain/distension, poor appetite   Signs/Symptoms Pt reports minimal PO intake x 1 week prior to admission; Clear liquid diet  at this time   Status: ongoing    Intervention   Intervention: Follow treatment progress, Care plan reviewed, Encourage intake    Goal:   General: Nutrition support treatment  PO: Tolerate PO, Increase intake, Advace diet as medically appropriate   Additional goals: No further weight loss    Monitoring/Evaluation:   Monitoring/Evaluation: Per protocol, I&O, PO intake, Supplement intake, Pertinent labs, Weight, GI status, Symptoms, POC/GOC    Saba Barr, PAL  Time Spent: 15 min

## 2021-07-19 NOTE — PROCEDURES
Clinical Indication:  72 year-old male with history of chronic pancreatitis and large amount of ascites.     Procedure:  CT guided paracentesis     :  Dr. Felix     Complication:  None apparent.     Technique:  The right mid-abdominal region was prepped and draped in the usual/sterile fashion.  Local anesthesia with Lidocaine was performed.  Utilizing CT localization, a 8.5 Danish drainage catheter placed into ascitic fluid centered in the right paracolic gutter.  A sample fluid was sent for laboratory analysis, per the referring service.  Approximately 2500 cc's of ascitic fluid was drained off.  The drainage catheter was removed and a sterile dressing was applied to the access site.  The patient tolerated the procedure well without complication.     Impression:  Technically successful CT guided paracentesis.

## 2021-07-20 ENCOUNTER — APPOINTMENT (OUTPATIENT)
Dept: GENERAL RADIOLOGY | Facility: HOSPITAL | Age: 71
End: 2021-07-20

## 2021-07-20 ENCOUNTER — ANESTHESIA EVENT (OUTPATIENT)
Dept: GASTROENTEROLOGY | Facility: HOSPITAL | Age: 71
End: 2021-07-20

## 2021-07-20 ENCOUNTER — ANESTHESIA (OUTPATIENT)
Dept: GASTROENTEROLOGY | Facility: HOSPITAL | Age: 71
End: 2021-07-20

## 2021-07-20 LAB
HAV AB SER QL IA: POSITIVE
Lab: NORMAL
METHYLMALONATE SERPL-SCNC: 227 NMOL/L (ref 0–378)

## 2021-07-20 PROCEDURE — 25010000002 ONDANSETRON PER 1 MG: Performed by: NURSE ANESTHETIST, CERTIFIED REGISTERED

## 2021-07-20 PROCEDURE — 25010000002 HYDROMORPHONE 1 MG/ML SOLUTION: Performed by: INTERNAL MEDICINE

## 2021-07-20 PROCEDURE — 25010000002 ONDANSETRON PER 1 MG: Performed by: INTERNAL MEDICINE

## 2021-07-20 PROCEDURE — 25010000002 HYDROMORPHONE PER 4 MG: Performed by: INTERNAL MEDICINE

## 2021-07-20 PROCEDURE — 25010000002 PHENYLEPHRINE 10 MG/ML SOLUTION: Performed by: NURSE ANESTHETIST, CERTIFIED REGISTERED

## 2021-07-20 PROCEDURE — 97110 THERAPEUTIC EXERCISES: CPT

## 2021-07-20 PROCEDURE — 97116 GAIT TRAINING THERAPY: CPT

## 2021-07-20 PROCEDURE — 43274 ERCP DUCT STENT PLACEMENT: CPT | Performed by: INTERNAL MEDICINE

## 2021-07-20 PROCEDURE — 25010000002 PROPOFOL 10 MG/ML EMULSION: Performed by: NURSE ANESTHETIST, CERTIFIED REGISTERED

## 2021-07-20 PROCEDURE — 74330 X-RAY BILE/PANC ENDOSCOPY: CPT

## 2021-07-20 PROCEDURE — BF18YZZ FLUOROSCOPY OF PANCREATIC DUCTS USING OTHER CONTRAST: ICD-10-PCS | Performed by: INTERNAL MEDICINE

## 2021-07-20 PROCEDURE — 25010000002 SUCCINYLCHOLINE PER 20 MG: Performed by: NURSE ANESTHETIST, CERTIFIED REGISTERED

## 2021-07-20 PROCEDURE — C2625 STENT, NON-COR, TEM W/DEL SY: HCPCS | Performed by: INTERNAL MEDICINE

## 2021-07-20 PROCEDURE — 0FJD8ZZ INSPECTION OF PANCREATIC DUCT, VIA NATURAL OR ARTIFICIAL OPENING ENDOSCOPIC: ICD-10-PCS | Performed by: INTERNAL MEDICINE

## 2021-07-20 PROCEDURE — 99232 SBSQ HOSP IP/OBS MODERATE 35: CPT | Performed by: INTERNAL MEDICINE

## 2021-07-20 PROCEDURE — C1769 GUIDE WIRE: HCPCS | Performed by: INTERNAL MEDICINE

## 2021-07-20 PROCEDURE — 25010000002 DEXAMETHASONE PER 1 MG: Performed by: NURSE ANESTHETIST, CERTIFIED REGISTERED

## 2021-07-20 DEVICE — PANCREATIC STENT KIT
Type: IMPLANTABLE DEVICE | Site: PANCREAS | Status: FUNCTIONAL
Brand: ADVANIX™ PANCREATIC STENT KIT

## 2021-07-20 RX ORDER — ONDANSETRON 2 MG/ML
4 INJECTION INTRAMUSCULAR; INTRAVENOUS ONCE AS NEEDED
Status: DISCONTINUED | OUTPATIENT
Start: 2021-07-20 | End: 2021-07-20 | Stop reason: HOSPADM

## 2021-07-20 RX ORDER — ONDANSETRON 2 MG/ML
INJECTION INTRAMUSCULAR; INTRAVENOUS AS NEEDED
Status: DISCONTINUED | OUTPATIENT
Start: 2021-07-20 | End: 2021-07-20 | Stop reason: SURG

## 2021-07-20 RX ORDER — PROPOFOL 10 MG/ML
VIAL (ML) INTRAVENOUS AS NEEDED
Status: DISCONTINUED | OUTPATIENT
Start: 2021-07-20 | End: 2021-07-20 | Stop reason: SURG

## 2021-07-20 RX ORDER — LABETALOL HYDROCHLORIDE 5 MG/ML
5 INJECTION, SOLUTION INTRAVENOUS
Status: DISCONTINUED | OUTPATIENT
Start: 2021-07-20 | End: 2021-07-20 | Stop reason: HOSPADM

## 2021-07-20 RX ORDER — SUCCINYLCHOLINE CHLORIDE 20 MG/ML
INJECTION INTRAMUSCULAR; INTRAVENOUS AS NEEDED
Status: DISCONTINUED | OUTPATIENT
Start: 2021-07-20 | End: 2021-07-20 | Stop reason: SURG

## 2021-07-20 RX ORDER — HYDROMORPHONE HYDROCHLORIDE 1 MG/ML
0.2 INJECTION, SOLUTION INTRAMUSCULAR; INTRAVENOUS; SUBCUTANEOUS
Status: DISCONTINUED | OUTPATIENT
Start: 2021-07-20 | End: 2021-07-20 | Stop reason: HOSPADM

## 2021-07-20 RX ORDER — LIDOCAINE HYDROCHLORIDE 10 MG/ML
INJECTION, SOLUTION EPIDURAL; INFILTRATION; INTRACAUDAL; PERINEURAL AS NEEDED
Status: DISCONTINUED | OUTPATIENT
Start: 2021-07-20 | End: 2021-07-20 | Stop reason: SURG

## 2021-07-20 RX ORDER — FENTANYL CITRATE 50 UG/ML
25 INJECTION, SOLUTION INTRAMUSCULAR; INTRAVENOUS
Status: DISCONTINUED | OUTPATIENT
Start: 2021-07-20 | End: 2021-07-20 | Stop reason: HOSPADM

## 2021-07-20 RX ORDER — DEXAMETHASONE SODIUM PHOSPHATE 4 MG/ML
INJECTION, SOLUTION INTRA-ARTICULAR; INTRALESIONAL; INTRAMUSCULAR; INTRAVENOUS; SOFT TISSUE AS NEEDED
Status: DISCONTINUED | OUTPATIENT
Start: 2021-07-20 | End: 2021-07-20 | Stop reason: SURG

## 2021-07-20 RX ORDER — PHENYLEPHRINE HYDROCHLORIDE 10 MG/ML
INJECTION INTRAVENOUS AS NEEDED
Status: DISCONTINUED | OUTPATIENT
Start: 2021-07-20 | End: 2021-07-20 | Stop reason: SURG

## 2021-07-20 RX ORDER — SODIUM CHLORIDE, SODIUM LACTATE, POTASSIUM CHLORIDE, CALCIUM CHLORIDE 600; 310; 30; 20 MG/100ML; MG/100ML; MG/100ML; MG/100ML
INJECTION, SOLUTION INTRAVENOUS CONTINUOUS PRN
Status: DISCONTINUED | OUTPATIENT
Start: 2021-07-20 | End: 2021-07-20 | Stop reason: SURG

## 2021-07-20 RX ORDER — EPHEDRINE SULFATE 50 MG/ML
INJECTION, SOLUTION INTRAVENOUS AS NEEDED
Status: DISCONTINUED | OUTPATIENT
Start: 2021-07-20 | End: 2021-07-20 | Stop reason: SURG

## 2021-07-20 RX ADMIN — SODIUM CHLORIDE, PRESERVATIVE FREE 10 ML: 5 INJECTION INTRAVENOUS at 20:25

## 2021-07-20 RX ADMIN — EPHEDRINE SULFATE 10 MG: 50 INJECTION, SOLUTION INTRAVENOUS at 17:34

## 2021-07-20 RX ADMIN — DEXTROSE AND SODIUM CHLORIDE 100 ML/HR: 5; 450 INJECTION, SOLUTION INTRAVENOUS at 07:00

## 2021-07-20 RX ADMIN — ONDANSETRON 4 MG: 2 INJECTION INTRAMUSCULAR; INTRAVENOUS at 20:25

## 2021-07-20 RX ADMIN — ONDANSETRON 4 MG: 2 INJECTION INTRAMUSCULAR; INTRAVENOUS at 17:32

## 2021-07-20 RX ADMIN — OXYCODONE HYDROCHLORIDE AND ACETAMINOPHEN 1 TABLET: 5; 325 TABLET ORAL at 23:12

## 2021-07-20 RX ADMIN — HYDROMORPHONE HYDROCHLORIDE 0.5 MG: 1 INJECTION, SOLUTION INTRAMUSCULAR; INTRAVENOUS; SUBCUTANEOUS at 11:04

## 2021-07-20 RX ADMIN — Medication 100 MG: at 16:34

## 2021-07-20 RX ADMIN — SODIUM CHLORIDE, PRESERVATIVE FREE 10 ML: 5 INJECTION INTRAVENOUS at 08:59

## 2021-07-20 RX ADMIN — LIDOCAINE HYDROCHLORIDE 30 MG: 10 INJECTION, SOLUTION EPIDURAL; INFILTRATION; INTRACAUDAL; PERINEURAL at 16:34

## 2021-07-20 RX ADMIN — DEXAMETHASONE SODIUM PHOSPHATE 8 MG: 4 INJECTION, SOLUTION INTRA-ARTICULAR; INTRALESIONAL; INTRAMUSCULAR; INTRAVENOUS; SOFT TISSUE at 16:47

## 2021-07-20 RX ADMIN — PROPOFOL 110 MG: 10 INJECTION, EMULSION INTRAVENOUS at 16:34

## 2021-07-20 RX ADMIN — EPHEDRINE SULFATE 15 MG: 50 INJECTION, SOLUTION INTRAVENOUS at 16:56

## 2021-07-20 RX ADMIN — MIRTAZAPINE 15 MG: 15 TABLET, FILM COATED ORAL at 20:24

## 2021-07-20 RX ADMIN — EPHEDRINE SULFATE 10 MG: 50 INJECTION, SOLUTION INTRAVENOUS at 17:29

## 2021-07-20 RX ADMIN — EPHEDRINE SULFATE 15 MG: 50 INJECTION, SOLUTION INTRAVENOUS at 16:53

## 2021-07-20 RX ADMIN — SODIUM CHLORIDE, POTASSIUM CHLORIDE, SODIUM LACTATE AND CALCIUM CHLORIDE: 600; 310; 30; 20 INJECTION, SOLUTION INTRAVENOUS at 16:30

## 2021-07-20 RX ADMIN — HYDROMORPHONE HYDROCHLORIDE 0.5 MG: 1 INJECTION, SOLUTION INTRAMUSCULAR; INTRAVENOUS; SUBCUTANEOUS at 01:39

## 2021-07-20 RX ADMIN — OXYCODONE HYDROCHLORIDE AND ACETAMINOPHEN 1 TABLET: 5; 325 TABLET ORAL at 02:14

## 2021-07-20 RX ADMIN — HYDROMORPHONE HYDROCHLORIDE 1 MG: 1 INJECTION, SOLUTION INTRAMUSCULAR; INTRAVENOUS; SUBCUTANEOUS at 12:37

## 2021-07-20 RX ADMIN — PHENYLEPHRINE HYDROCHLORIDE 160 MCG: 10 INJECTION INTRAVENOUS at 16:38

## 2021-07-20 RX ADMIN — HYDROMORPHONE HYDROCHLORIDE 1 MG: 1 INJECTION, SOLUTION INTRAMUSCULAR; INTRAVENOUS; SUBCUTANEOUS at 20:24

## 2021-07-20 RX ADMIN — HYDROMORPHONE HYDROCHLORIDE 0.5 MG: 1 INJECTION, SOLUTION INTRAMUSCULAR; INTRAVENOUS; SUBCUTANEOUS at 08:59

## 2021-07-20 NOTE — THERAPY TREATMENT NOTE
Patient Name: Abhijit Aldrich  : 1950    MRN: [id number]                              Today's Date: 2021       Admit Date: 2021    Visit Dx:     ICD-10-CM ICD-9-CM   1. Idiopathic acute pancreatitis without infection or necrosis  K85.00 577.0   2. Other cirrhosis of liver (CMS/HCC)  K74.69 571.5   3. Severe protein-calorie malnutrition (CMS/HCC)  E43 262     Patient Active Problem List   Diagnosis   • Acute pancreatitis   • Cirrhosis (CMS/HCC)   • CAD (coronary artery disease)   • Hypertension   • PVD (peripheral vascular disease) (CMS/HCC)   • Severe malnutrition (CMS/HCC)   • Anemia   • Weight loss   • Numbness of right anterior thigh     Past Medical History:   Diagnosis Date   • CAD (coronary artery disease)    • Cirrhosis (CMS/HCC)    • Hypertension    • PVD (peripheral vascular disease) (CMS/HCC)      Past Surgical History:   Procedure Laterality Date   • ABDOMINAL AORTIC ANEURYSM REPAIR     • AORTA BIFEMORAL BYPASS       General Information     Row Name 21 1113          Physical Therapy Time and Intention    Document Type  therapy note (daily note)  -AS     Mode of Treatment  physical therapy  -AS     Row Name 21 1113          General Information    Patient Profile Reviewed  yes  -AS     Existing Precautions/Restrictions  fall;seizures  -AS     Barriers to Rehab  none identified  -AS     Row Name 21 [facility]          Cognition    Orientation Status (Cognition)  oriented x 4  -AS     Row Name 21 [facility]          Safety Issues, Functional Mobility    Impairments Affecting Function (Mobility)  balance;endurance/activity tolerance;pain;strength  -AS     Comment, Safety Issues/Impairments (Mobility)  patient had paracentesis yesterday and has pain at site, nursing notified and aware. NPO today for biopsy.  -AS       User Key  (r) = Recorded By, (t) = Taken By, (c) = Cosigned By    Initials Name Provider Type    AS Michaela Jacobsen PTA Physical Therapy Assistant        Mobility      Row Name 07/20/21 1115          Bed Mobility    Comment (Bed Mobility)  sitting UIC  -AS     Row Name 07/20/21 1115          Transfers    Comment (Transfers)  verbal cues to push up from recliner  -AS     Row Name 07/20/21 1115          Sit-Stand Transfer    Sit-Stand Young (Transfers)  verbal cues;standby assist;1 person assist  -AS     Assistive Device (Sit-Stand Transfers)  walker, front-wheeled  -AS     Row Name 07/20/21 1115          Gait/Stairs (Locomotion)    Young Level (Gait)  verbal cues;contact guard;1 person assist  -AS     Assistive Device (Gait)  walker, front-wheeled  -AS     Distance in Feet (Gait)  200'  -AS     Deviations/Abnormal Patterns (Gait)  mara decreased;gait speed decreased  -AS     Bilateral Gait Deviations  forward flexed posture  -AS     Comment (Gait/Stairs)  patient ambulated 200' with CGA x1 and rolling walker for support, patient with increased forward flexed posture due to abdominal pain, nsg notified and aware. Distance limited by weakness and pain.  -AS       User Key  (r) = Recorded By, (t) = Taken By, (c) = Cosigned By    Initials Name Provider Type    AS Michaela Jacobsen PTA Physical Therapy Assistant        Obj/Interventions     Row Name 07/20/21 1120          Motor Skills    Therapeutic Exercise  knee;ankle  -AS     [facility] Name 07/20/21 1120          Knee (Therapeutic Exercise)    Knee (Therapeutic Exercise)  strengthening exercise  -AS     Knee Strengthening (Therapeutic Exercise)  bilateral;marching while seated;LAQ (long arc quad);sitting;10 repetitions  -AS     [facility] Name 07/20/21 1120          Ankle (Therapeutic Exercise)    Ankle (Therapeutic Exercise)  strengthening exercise  -AS     Ankle Strengthening (Therapeutic Exercise)  bilateral;dorsiflexion;plantarflexion;sitting;10 repetitions  -AS       User Key  (r) = Recorded By, (t) = Taken By, (c) = Cosigned By    Initials Name Provider Type    AS Michaela Jacobsen PTA Physical Therapy Assistant         Goals/Plan    No documentation.       Clinical Impression     Row Name 07/20/21 1120          Pain    Additional Documentation  Pain Scale: Numbers Pre/Post-Treatment (Group)  -AS     [facility] Name 07/20/21 1120          Pain Scale: Numbers Pre/Post-Treatment    Pretreatment Pain Rating  8/10  -AS     Posttreatment Pain Rating  8/10  -AS     Pain Location  abdomen  -AS     Pre/Posttreatment Pain Comment  nsg notified and aware  -AS     Pain Intervention(s)  Repositioned;Ambulation/increased activity  -AS     Row Name 07/20/21 1120          Plan of Care Review    Plan of Care Reviewed With  patient  -AS     Progress  improving  -AS     Outcome Summary  patient ambulated 200' with CGA x1 and rolling walker for support, patient with increased forward flexed posture due to abdominal pain, nsg notified and aware. Distance limited by weakness and pain.  -AS     [facility] Name 07/20/21 1120          Positioning and Restraints    Pre-Treatment Position  sitting in chair/recliner  -AS     Post Treatment Position  chair  -AS     In Chair  sitting;call light within reach;encouraged to call for assist;notified nsg  -AS       User Key  (r) = Recorded By, (t) = Taken By, (c) = Cosigned By    Initials Name Provider Type    AS Michaela Jacobsen, ABDI Physical Therapy Assistant        Outcome Measures     Row Name 07/20/21 1121 07/20/21 0845       How much help from another person do you currently need...    Turning from your back to your side while in flat bed without using bedrails?  4  -AS  4  -ML    Moving from lying on back to sitting on the side of a flat bed without bedrails?  4  -AS  4  -ML    Moving to and from a bed to a chair (including a wheelchair)?  4  -AS  4  -ML    Standing up from a chair using your arms (e.g., wheelchair, bedside chair)?  4  -AS  4  -ML    Climbing 3-5 steps with a railing?  3  -AS  3  -ML    To walk in hospital room?  3  -AS  4  -ML    AM-PAC 6 Clicks Score (PT)  22  -AS  23  -ML    Row Name 07/20/21  1121          Functional Assessment    Outcome Measure Options  AM-PAC 6 Clicks Basic Mobility (PT)  -AS       User Key  (r) = Recorded By, (t) = Taken By, (c) = Cosigned By    Initials Name Provider Type    AS Michaela Jacobsen PTA Physical Therapy Assistant    Kelly Martinez RN Registered Nurse                       Physical Therapy Education                 Title: PT OT SLP Therapies (In Progress)     Topic: Physical Therapy (In Progress)     Point: Mobility training (In Progress)     Learning Progress Summary           Patient Acceptance, E, NR by AS at 7/20/2021 1121    Acceptance, E, VU by LM at 7/18/2021 1405                   Point: Home exercise program (In Progress)     Learning Progress Summary           Patient Acceptance, E, NR by AS at 7/20/2021 1121                   Point: Precautions (In Progress)     Learning Progress Summary           Patient Acceptance, E, NR by AS at 7/20/2021 1121    Acceptance, E, VU by LM at 7/18/2021 1405                               User Key     Initials Effective Dates Name Provider Type Discipline    AS 06/16/21 -  Michaela Jacobsen PTA Physical Therapy Assistant PT     06/16/21 -  Omaira Dorado PT Physical Therapist PT              PT Recommendation and Plan     Plan of Care Reviewed With: patient  Progress: improving  Outcome Summary: patient ambulated 200' with CGA x1 and rolling walker for support, patient with increased forward flexed posture due to abdominal pain, nsg notified and aware. Distance limited by weakness and pain.     Time Calculation:   PT Charges     Row Name 07/20/21 1121             Time Calculation    Start Time  1046  -AS      PT Received On  07/20/21  -AS      PT Goal Re-Cert Due Date  07/28/21  -AS         Timed Charges    24617 - PT Therapeutic Exercise Minutes  8  -AS      40621 - Gait Training Minutes   16  -AS         Total Minutes    Timed Charges Total Minutes  24  -AS       Total Minutes  24  -AS        User Key  (r) =  Recorded By, (t) = Taken By, (c) = Cosigned By    Initials Name Provider Type    AS Michaela Jacobsen PTA Physical Therapy Assistant        Therapy Charges for Today     Code Description Service Date Service Provider Modifiers Qty    55313357678 HC PT THER PROC EA 15 MIN 7/20/2021 Michaela Jacobsen PTA  1    87964378460 HC GAIT TRAINING EA 15 MIN 7/20/2021 Michaela Jacobsen PTA  1          PT G-Codes  Outcome Measure Options: AM-PAC 6 Clicks Basic Mobility (PT)  AM-PAC 6 Clicks Score (PT): 22  AM-PAC 6 Clicks Score (OT): 24    Michaela Jacobsen PTA  7/20/2021

## 2021-07-20 NOTE — POST-PROCEDURE NOTE
ERCP-  Abnormal mucosa at pylorus.  Unable to cannulate CBD.  PD stent placed.    REC Repeat exam tomorrow.    May need gastroscope to better view pylorus

## 2021-07-20 NOTE — PLAN OF CARE
Pt stayed in bed until around three and moved to chair. Patient has difficulty sleeping because of abdominal pain. Pt abdomen is still a little distended and pt complains of aching pain.  Pt spirits are high and is very grateful for his care. Telemetry is sinus arrhythmia with frequent PAC. VSS. Dressing on pt back remains clean dry and intact. Patient is up ad terrence, independent, and continent. Uses urinal. IV changed at 0300.   Problem: Adult Inpatient Plan of Care  Goal: Plan of Care Review  Outcome: Ongoing, Progressing  Goal: Patient-Specific Goal (Individualized)  Outcome: Ongoing, Progressing  Goal: Absence of Hospital-Acquired Illness or Injury  Outcome: Ongoing, Progressing  Intervention: Identify and Manage Fall Risk  Recent Flowsheet Documentation  Taken 7/20/2021 0400 by Paulo White, RNA  Safety Promotion/Fall Prevention:   activity supervised   assistive device/personal items within reach   clutter free environment maintained   fall prevention program maintained   nonskid shoes/slippers when out of bed   safety round/check completed   room organization consistent  Taken 7/20/2021 0214 by Paulo White, RNA  Safety Promotion/Fall Prevention:   activity supervised   assistive device/personal items within reach   clutter free environment maintained   fall prevention program maintained   lighting adjusted   nonskid shoes/slippers when out of bed   room organization consistent   safety round/check completed  Taken 7/20/2021 0000 by Paulo White, RNA  Safety Promotion/Fall Prevention:   activity supervised   assistive device/personal items within reach   clutter free environment maintained   fall prevention program maintained   nonskid shoes/slippers when out of bed   room organization consistent   safety round/check completed  Taken 7/19/2021 2200 by Paulo White, RNA  Safety Promotion/Fall Prevention:   activity supervised   assistive device/personal items within reach    clutter free environment maintained   fall prevention program maintained   lighting adjusted   nonskid shoes/slippers when out of bed   room organization consistent   safety round/check completed  Taken 7/19/2021 2000 by Paulo White RNA  Safety Promotion/Fall Prevention:   activity supervised   assistive device/personal items within reach   clutter free environment maintained   fall prevention program maintained   lighting adjusted   nonskid shoes/slippers when out of bed   room organization consistent   safety round/check completed  Intervention: Prevent Skin Injury  Recent Flowsheet Documentation  Taken 7/20/2021 0400 by Paulo White, RNA  Body Position: position changed independently  Taken 7/20/2021 0214 by Paulo White, RNA  Body Position: position changed independently  Taken 7/20/2021 0000 by Paulo White RNA  Body Position: position changed independently  Taken 7/19/2021 2200 by Paulo White, RNA  Body Position: position changed independently  Taken 7/19/2021 2000 by Paulo White, RNA  Body Position: position changed independently  Skin Protection:   adhesive use limited   incontinence pads utilized  Intervention: Prevent Infection  Recent Flowsheet Documentation  Taken 7/19/2021 2000 by Paulo White, RNA  Infection Prevention: single patient room provided  Goal: Optimal Comfort and Wellbeing  Outcome: Ongoing, Progressing  Intervention: Provide Person-Centered Care  Recent Flowsheet Documentation  Taken 7/19/2021 2000 by Paulo White RNA  Trust Relationship/Rapport: care explained  Goal: Readiness for Transition of Care  Outcome: Ongoing, Progressing     Problem: Skin Injury Risk Increased  Goal: Skin Health and Integrity  Outcome: Ongoing, Progressing  Intervention: Optimize Skin Protection  Recent Flowsheet Documentation  Taken 7/20/2021 0400 by Paulo White, RNA  Head of Bed (HOB): HOB at 20-30 degrees  Taken 7/20/2021 0214 by  Paulo White RNA  Head of Bed (HOB): HOB at 20-30 degrees  Taken 7/20/2021 0000 by Paulo White RNA  Head of Bed (HOB): HOB at 20-30 degrees  Taken 7/19/2021 2200 by Paulo White RNA  Head of Bed (HOB): HOB at 20-30 degrees  Taken 7/19/2021 2000 by Paulo White RNA  Pressure Reduction Techniques: frequent weight shift encouraged  Head of Bed (HOB): HOB at 20-30 degrees  Pressure Reduction Devices: positioning supports utilized  Skin Protection:   adhesive use limited   incontinence pads utilized     Problem: Pain Acute  Goal: Optimal Pain Control  Outcome: Ongoing, Progressing  Intervention: Develop Pain Management Plan  Recent Flowsheet Documentation  Taken 7/20/2021 0214 by Paulo White RNA  Pain Management Interventions: see MAR  Taken 7/20/2021 0139 by Paulo White RNA  Pain Management Interventions: see MAR  Taken 7/19/2021 2243 by Paulo White RNA  Pain Management Interventions: see MAR  Taken 7/19/2021 1946 by White, Paulo D, RNA  Pain Management Interventions: see MAR  Intervention: Optimize Psychosocial Wellbeing  Recent Flowsheet Documentation  Taken 7/19/2021 2000 by Paulo White RNA  Supportive Measures: active listening utilized  Diversional Activities: television     Problem: Oral Intake Inadequate  Goal: Improved Oral Intake  Outcome: Ongoing, Progressing   Goal Outcome Evaluation:

## 2021-07-20 NOTE — ANESTHESIA PREPROCEDURE EVALUATION
Anesthesia Evaluation     Patient summary reviewed   no history of anesthetic complications:  NPO Solid Status: > 8 hours  NPO Liquid Status: > 8 hours           Airway   Mallampati: II  TM distance: >3 FB  Neck ROM: full  No difficulty expected  Dental      Pulmonary - normal exam   (+) a smoker,   Cardiovascular - normal exam    ECG reviewed    (+) hypertension, PVD,     ROS comment: 7/17/21 - SR PACs   7/17/21 -monster- lvef 51-55, nl rvsf, mild ai/mr, trace tr, no effusions.    Neuro/Psych  (+) numbness,     GI/Hepatic/Renal/Endo    (+)   liver disease (cirrhosis),     Musculoskeletal     Abdominal    Substance History   (+) alcohol use,      OB/GYN          Other        ROS/Med Hx Other: Hx cirrhosis s/p paracentesis  hgb 12.2 inr .1 k 3.4  ?  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  )  intravenous induction     Anesthetic plan, all risks, benefits, and alternatives have been provided, discussed and informed consent has been obtained with: patient.  Use of blood products discussed with patient  Consented to blood products.   Plan discussed with CRNA.

## 2021-07-20 NOTE — ANESTHESIA POSTPROCEDURE EVALUATION
Patient: Abhijit Aldrich    Procedure Summary     Date: 07/20/21 Room / Location:  [facility]    Anesthesia Start: 1628 Anesthesia Stop: 1756    Procedure: ENDOSCOPIC RETROGRADE CHOLANGIOPANCREATOGRAPHY (N/A ) Diagnosis:     Surgeons: Fer Menezes MD Provider: Michaela Kwan DO    Anesthesia Type: general ASA Status: 3          Anesthesia Type: general    Vitals  No vitals data found for the desired time range.          Post Anesthesia Care and Evaluation    Patient location during evaluation: PACU  Patient participation: complete - patient participated  Level of consciousness: sleepy but conscious  Pain management: adequate  Airway patency: patent  Anesthetic complications: No anesthetic complications  PONV Status: none  Cardiovascular status: hemodynamically stable and acceptable  Respiratory status: nonlabored ventilation, acceptable, nasal cannula and airway suctioned  Hydration status: acceptable    Comments: To PACU on O2NC, breathing comfortably.  Report to PACU RN at bedside. VSS.

## 2021-07-20 NOTE — PLAN OF CARE
Goal Outcome Evaluation:  Plan of Care Reviewed With: patient        Progress: improving  Outcome Summary: patient ambulated 200' with CGA x1 and rolling walker for support, patient with increased forward flexed posture due to abdominal pain, nsg notified and aware. Distance limited by weakness and pain.

## 2021-07-20 NOTE — ANESTHESIA PROCEDURE NOTES
Airway  Urgency: elective    Date/Time: 7/20/2021 4:34 PM  Airway not difficult    General Information and Staff    Patient location during procedure: OR  CRNA: Linda Whitney CRNA    Indications and Patient Condition  Indications for airway management: airway protection    Preoxygenated: yes  MILS maintained throughout  Mask difficulty assessment: 0 - not attempted    Final Airway Details  Final airway type: endotracheal airway      Successful airway: ETT  Cuffed: yes   Successful intubation technique: direct laryngoscopy  Facilitating devices/methods: intubating stylet and cricoid pressure  Endotracheal tube insertion site: oral  Blade: Patino  Blade size: 2  ETT size (mm): 7.0  Cormack-Lehane Classification: grade I - full view of glottis  Placement verified by: chest auscultation and capnometry   Cuff volume (mL): 5  Measured from: lips  ETT/EBT  to lips (cm): 22  Number of attempts at approach: 1  Assessment: lips, teeth, and gum same as pre-op and atraumatic intubation    Additional Comments  Pt to ENDO 3. Supine on stretcher, bilateral arms to the side. ASA monitors placed. Pre-O2 with 100% oxygen. SIVI with RSI. Atraumatic intubation. +ETCO2. +BBS. Pt was positioned prone with ENDO staff assistance after OETT secured.

## 2021-07-20 NOTE — PROGRESS NOTES
[facility] Medicine Services  PROGRESS NOTE    Patient Name: Abhijit Aldrich  : 1950  MRN: [id number]    Date of Admission: 2021  Primary Care Physician: Nellie Ocasio MD    Subjective   Subjective     CC:  abd pain/weight loss    HPI:  Sitting in chair. Reports pain following paracentesis. Anticipating ERCP today    ROS:  Gen- No fevers, chills  CV- No chest pain, palpitations  Resp- No cough, dyspnea  GI- No N/V/D, +abd pain         Objective   Objective     Vital Signs:   Temp:  [97.6 °F (36.4 °C)-98.1 °F (36.7 °C)] 97.8 °F (36.6 °C)  Heart Rate:  [60-88] 88  Resp:  [16-20] 18  BP: (105-126)/(61-98) 117/78     Physical Exam:  GEN- pleasant, appears older than stated age and cachetic, sitting up in chair  HEENT- atraumatic, normocephalic, eomi, poor dentition  NECK- supple, trachea midline, no masses  RESP: ctab, normal effort  CV: no murmurs, s1/s2, rrr  GI: soft, epigastric TTP , less distension  MSK: no edema noted, spontaneous movement of all extremities  NEURO: alert, oriented, no focal deficits  SKIN: no rashes  PSYCH: appropriate mood and affect       Results Reviewed:  LAB RESULTS:      Lab 21  0432 21  0546 21  0604 21  2153 21  1721   WBC 5.81 6.23 5.56  --  8.35   HEMOGLOBIN 12.2* 13.2 12.6*  --  12.6*   HEMATOCRIT 36.3* 38.8 36.9*  --  36.9*   PLATELETS 226 257 226  --  249   NEUTROS ABS 4.17 4.51 4.07  --  6.09   IMMATURE GRANS (ABS) 0.01 0.02 0.02  --  0.02   LYMPHS ABS 1.26 1.22 1.03  --  1.75   MONOS ABS 0.35 0.44 0.37  --  0.45   EOS ABS 0.01 0.02 0.05  --  0.02   MCV 97.3* 96.3 97.6*  --  96.9   LACTATE  --   --   --   --  1.5   PROTIME  --   --  14.2 14.1  --          Lab 21  0432 21  0546 21  0604 21  1721   SODIUM 135* 133* 133* 133*   POTASSIUM 3.4* 3.6 3.6 4.1   CHLORIDE 101 97* 96* 92*   CO2 25.0 26.0 27.0 30.0*   ANION GAP 9.0 10.0 10.0 11.0   BUN 10 14 20 23   CREATININE 0.51* 0.56* 0.65*  0.84   GLUCOSE 95 104* 98 105*   CALCIUM 8.1* 8.3* 8.0* 8.6   PHOSPHORUS  --   --  3.0  --    TSH  --   --  1.400  --          Lab 07/19/21  0432 07/18/21  0546 07/16/21  1721   TOTAL PROTEIN 5.2* 5.5* 6.3   ALBUMIN 2.90* 3.00* 3.40*   GLOBULIN 2.3 2.5 2.9   ALT (SGPT) 23 24 28   AST (SGOT) 31 35 36   BILIRUBIN 0.4 0.5 0.4   ALK PHOS 138* 147* 157*   LIPASE  --   --  706*         Lab 07/17/21  0604 07/16/21  2153   PROTIME 14.2 14.1   INR 1.13 1.12         Lab 07/16/21  1721   CHOLESTEROL 118  115   LDL CHOL 53  51   HDL CHOL 30*  30*   TRIGLYCERIDES 214*  212*         Lab 07/17/21  0604   IRON 75   IRON SATURATION 28   TIBC 271*   TRANSFERRIN 182*   FERRITIN 162.40   FOLATE >20.00   VITAMIN B 12 704         Brief Urine Lab Results  (Last result in the past 365 days)      Color   Clarity   Blood   Leuk Est   Nitrite   Protein   CREAT   Urine HCG        07/16/21 1946 Yellow Clear Negative Negative Negative Negative               Microbiology Results Abnormal     Procedure Component Value - Date/Time    Body Fluid Culture - Body Fluid, Peritoneum [[id number]] Collected: 07/19/21 1520    Lab Status: Preliminary result Specimen: Body Fluid from Peritoneum Updated: 07/20/21 0703     Body Fluid Culture No growth     Gram Stain Moderate (3+) WBCs seen      No organisms seen    COVID-19 and FLU A/B PCR - Swab, Nasopharynx [[id number]]  (Normal) Collected: 07/16/21 1934    Lab Status: Final result Specimen: Swab from Nasopharynx Updated: 07/16/21 2012     COVID19 Not Detected     Influenza A PCR Not Detected     Influenza B PCR Not Detected    Narrative:      Fact sheet for providers: https://www.fda.gov/media/557530/download    Fact sheet for patients: https://www.fda.gov/media/871605/download    Test performed by PCR.          No radiology results from the last 24 hrs    Results for orders placed during the hospital encounter of 07/16/21    Adult Transthoracic Echo Complete w/ Color, Spectral and Contrast if necessary  per protocol    Interpretation Summary  · The study is technically difficult for diagnosis with poor acoustic windows. The quality of the study is extremely limited due to limited views obtained, patient body habitus and patient positioning.  · Left ventricular ejection fraction appears to be 51 - 55%. Left ventricular systolic function is normal.  · There is calcification of the aortic valve.  · Mild aortic valve regurgitation is present.  · Mild mitral valve regurgitation is present.      I have reviewed the medications:  Scheduled Meds:aspirin, 81 mg, Oral, Daily  heparin (porcine), 5,000 Units, Subcutaneous, Q8H  mirtazapine, 15 mg, Oral, Nightly  nicotine, 1 patch, Transdermal, Q24H  pancrelipase (Lip-Prot-Amyl), 24,000 units of lipase, Oral, TID With Meals  sodium chloride, 10 mL, Intravenous, Q12H      Continuous Infusions:dextrose 5 % and sodium chloride 0.45 %, 100 mL/hr, Last Rate: 100 mL/hr (07/20/21 0700)      PRN Meds:.HYDROmorphone  •  HYDROmorphone  •  melatonin  •  [DISCONTINUED] Morphine **AND** naloxone  •  nicotine polacrilex  •  ondansetron  •  oxyCODONE-acetaminophen  •  Sodium Chloride (PF)  •  sodium chloride    Assessment/Plan   Assessment & Plan     Active Hospital Problems    Diagnosis  POA   • **Acute pancreatitis [K85.90]  Yes   • Cirrhosis (CMS/HCC) [K74.60]  Unknown   • CAD (coronary artery disease) [I25.10]  Unknown   • Hypertension [I10]  Unknown   • PVD (peripheral vascular disease) (CMS/HCC) [I73.9]  Unknown   • Severe malnutrition (CMS/HCC) [E43]  Unknown   • Anemia [D64.9]  Unknown   • Weight loss [R63.4]  Unknown   • Numbness of right anterior thigh [R20.0]  Unknown      Resolved Hospital Problems   No resolved problems to display.        Brief Hospital Course to date:  Abhijit Aldrich is a 71-year-old male patient with a PMH significant for PVD status post aortic femoral bypass (per patient), history of aortic aneurysm status post repair, tobacco abuse, chronic pancreatitis who  presents to the ED due to abdominal pain found to have acute pancreatitis.    This patient's problems and plans were partially entered by my partner and updated as appropriate by me 07/20/21.         Acute on chronic pancreatitis  Cirrhosis with ascites and abd varices on CT imaging  Concern for possible malignancy and ?pancreatic head mass   Elevated CA 19-9  -GI consulted, appreciate assistance, MRCP done with abnl biliary dilatation and irregular contour with double duct sign of CBD/main pancreatic duct concerning for pancreatic head lesion  --noted  significantly elevated- concerning for malignancy   -[facility] GB done 7/17 with mod/large volume ascites and evidence of liver cirrhosis  -Morphine as needed for pain, have also added oral percocet per patient request   -Advance diet as tolerated, nutrition consult   -Reports alcohol use remotely but reports none in years  -Continue home Creon  --GI has ordered further lab studies including AFP, hep A/B serologies- pending  --s/p paracentesis 7/19 with 2.5 L removed, cytology/cx pending, not c/w SBP  ---GI further recs for ERCP today with biliary brushings and stent placement     Severe protein calorie malnutrition  Progressive weight loss  -Mildly decreased albumin  -BMI 14.37  -Banana bag x3 days  -Nutrition consult     Anemia  -Anemia studies  -A.m. labs     Numbness to the anterior thigh  -PT/OT  -Consider imaging to T/L-spine  -No red flag symptoms currently     CAD  PVD  Hypertension  -Hold spironolactone, Lasix while giving IVFs  -Continue home aspirin    DVT prophylaxis:  Medical DVT prophylaxis orders are present.       Disposition: I expect the patient to be discharged pending further GI w/u    CODE STATUS:   Code Status and Medical Interventions:   Ordered at: 07/16/21 0283     Level Of Support Discussed With:    Patient     Code Status:    CPR     Medical Interventions (Level of Support Prior to Arrest):    Full       Maine Dominguez,  MD  07/20/21

## 2021-07-21 ENCOUNTER — ANESTHESIA EVENT (OUTPATIENT)
Dept: GASTROENTEROLOGY | Facility: HOSPITAL | Age: 71
End: 2021-07-21

## 2021-07-21 ENCOUNTER — ANESTHESIA (OUTPATIENT)
Dept: GASTROENTEROLOGY | Facility: HOSPITAL | Age: 71
End: 2021-07-21

## 2021-07-21 ENCOUNTER — APPOINTMENT (OUTPATIENT)
Dept: GENERAL RADIOLOGY | Facility: HOSPITAL | Age: 71
End: 2021-07-21

## 2021-07-21 PROBLEM — K86.89 PANCREATIC MASS: Status: ACTIVE | Noted: 2021-07-16

## 2021-07-21 LAB
ALBUMIN SERPL-MCNC: 2.8 G/DL (ref 3.5–5.2)
ALBUMIN/GLOB SERPL: 1.1 G/DL
ALP SERPL-CCNC: 147 U/L (ref 39–117)
ALT SERPL W P-5'-P-CCNC: 23 U/L (ref 1–41)
ANION GAP SERPL CALCULATED.3IONS-SCNC: 7 MMOL/L (ref 5–15)
AST SERPL-CCNC: 36 U/L (ref 1–40)
BASOPHILS # BLD AUTO: 0 10*3/MM3 (ref 0–0.2)
BASOPHILS NFR BLD AUTO: 0 % (ref 0–1.5)
BILIRUB SERPL-MCNC: 0.3 MG/DL (ref 0–1.2)
BUN SERPL-MCNC: 15 MG/DL (ref 8–23)
BUN/CREAT SERPL: 21.1 (ref 7–25)
CALCIUM SPEC-SCNC: 8.1 MG/DL (ref 8.6–10.5)
CHLORIDE SERPL-SCNC: 108 MMOL/L (ref 98–107)
CO2 SERPL-SCNC: 24 MMOL/L (ref 22–29)
CREAT SERPL-MCNC: 0.71 MG/DL (ref 0.76–1.27)
DEPRECATED RDW RBC AUTO: 50.4 FL (ref 37–54)
EOSINOPHIL # BLD AUTO: 0 10*3/MM3 (ref 0–0.4)
EOSINOPHIL NFR BLD AUTO: 0 % (ref 0.3–6.2)
ERYTHROCYTE [DISTWIDTH] IN BLOOD BY AUTOMATED COUNT: 13.7 % (ref 12.3–15.4)
GFR SERPL CREATININE-BSD FRML MDRD: 110 ML/MIN/1.73
GLOBULIN UR ELPH-MCNC: 2.5 GM/DL
GLUCOSE SERPL-MCNC: 121 MG/DL (ref 65–99)
HCT VFR BLD AUTO: 37.2 % (ref 37.5–51)
HGB BLD-MCNC: 12.3 G/DL (ref 13–17.7)
IMM GRANULOCYTES # BLD AUTO: 0.03 10*3/MM3 (ref 0–0.05)
IMM GRANULOCYTES NFR BLD AUTO: 0.5 % (ref 0–0.5)
LIPASE SERPL-CCNC: 450 U/L (ref 13–60)
LYMPHOCYTES # BLD AUTO: 0.91 10*3/MM3 (ref 0.7–3.1)
LYMPHOCYTES NFR BLD AUTO: 14.5 % (ref 19.6–45.3)
MCH RBC QN AUTO: 33 PG (ref 26.6–33)
MCHC RBC AUTO-ENTMCNC: 33.1 G/DL (ref 31.5–35.7)
MCV RBC AUTO: 99.7 FL (ref 79–97)
MONOCYTES # BLD AUTO: 0.42 10*3/MM3 (ref 0.1–0.9)
MONOCYTES NFR BLD AUTO: 6.7 % (ref 5–12)
NEUTROPHILS NFR BLD AUTO: 4.91 10*3/MM3 (ref 1.7–7)
NEUTROPHILS NFR BLD AUTO: 78.3 % (ref 42.7–76)
NRBC BLD AUTO-RTO: 0 /100 WBC (ref 0–0.2)
PLATELET # BLD AUTO: 224 10*3/MM3 (ref 140–450)
PMV BLD AUTO: 9.9 FL (ref 6–12)
POTASSIUM SERPL-SCNC: 5.1 MMOL/L (ref 3.5–5.2)
PROT SERPL-MCNC: 5.3 G/DL (ref 6–8.5)
RBC # BLD AUTO: 3.73 10*6/MM3 (ref 4.14–5.8)
SODIUM SERPL-SCNC: 139 MMOL/L (ref 136–145)
WBC # BLD AUTO: 6.27 10*3/MM3 (ref 3.4–10.8)

## 2021-07-21 PROCEDURE — C1769 GUIDE WIRE: HCPCS | Performed by: INTERNAL MEDICINE

## 2021-07-21 PROCEDURE — 0FD98ZX EXTRACTION OF COMMON BILE DUCT, VIA NATURAL OR ARTIFICIAL OPENING ENDOSCOPIC, DIAGNOSTIC: ICD-10-PCS | Performed by: INTERNAL MEDICINE

## 2021-07-21 PROCEDURE — 74330 X-RAY BILE/PANC ENDOSCOPY: CPT

## 2021-07-21 PROCEDURE — BF11YZZ FLUOROSCOPY OF BILIARY AND PANCREATIC DUCTS USING OTHER CONTRAST: ICD-10-PCS | Performed by: INTERNAL MEDICINE

## 2021-07-21 PROCEDURE — 99233 SBSQ HOSP IP/OBS HIGH 50: CPT | Performed by: INTERNAL MEDICINE

## 2021-07-21 PROCEDURE — 88112 CYTOPATH CELL ENHANCE TECH: CPT | Performed by: INTERNAL MEDICINE

## 2021-07-21 PROCEDURE — 43274 ERCP DUCT STENT PLACEMENT: CPT | Performed by: INTERNAL MEDICINE

## 2021-07-21 PROCEDURE — 80053 COMPREHEN METABOLIC PANEL: CPT | Performed by: INTERNAL MEDICINE

## 2021-07-21 PROCEDURE — 0F798DZ DILATION OF COMMON BILE DUCT WITH INTRALUMINAL DEVICE, VIA NATURAL OR ARTIFICIAL OPENING ENDOSCOPIC: ICD-10-PCS | Performed by: INTERNAL MEDICINE

## 2021-07-21 PROCEDURE — 25010000002 HYDROMORPHONE 1 MG/ML SOLUTION: Performed by: INTERNAL MEDICINE

## 2021-07-21 PROCEDURE — 85025 COMPLETE CBC W/AUTO DIFF WBC: CPT | Performed by: INTERNAL MEDICINE

## 2021-07-21 PROCEDURE — 25010000002 HYDROMORPHONE PER 4 MG: Performed by: INTERNAL MEDICINE

## 2021-07-21 PROCEDURE — 25010000002 PROPOFOL 10 MG/ML EMULSION: Performed by: ANESTHESIOLOGY

## 2021-07-21 PROCEDURE — 74328 X-RAY BILE DUCT ENDOSCOPY: CPT | Performed by: INTERNAL MEDICINE

## 2021-07-21 PROCEDURE — 97116 GAIT TRAINING THERAPY: CPT

## 2021-07-21 PROCEDURE — 83690 ASSAY OF LIPASE: CPT | Performed by: INTERNAL MEDICINE

## 2021-07-21 PROCEDURE — 97110 THERAPEUTIC EXERCISES: CPT

## 2021-07-21 PROCEDURE — C1874 STENT, COATED/COV W/DEL SYS: HCPCS | Performed by: INTERNAL MEDICINE

## 2021-07-21 DEVICE — STENT SYSTEM RMV
Type: IMPLANTABLE DEVICE | Site: BILE DUCT | Status: FUNCTIONAL
Brand: WALLFLEX BILIARY

## 2021-07-21 RX ORDER — SODIUM CHLORIDE, SODIUM LACTATE, POTASSIUM CHLORIDE, CALCIUM CHLORIDE 600; 310; 30; 20 MG/100ML; MG/100ML; MG/100ML; MG/100ML
9 INJECTION, SOLUTION INTRAVENOUS CONTINUOUS
Status: DISCONTINUED | OUTPATIENT
Start: 2021-07-21 | End: 2021-07-22

## 2021-07-21 RX ORDER — 0.9 % SODIUM CHLORIDE 0.9 %
10 VIAL (ML) INJECTION ONCE
Status: COMPLETED | OUTPATIENT
Start: 2021-07-21 | End: 2021-07-21

## 2021-07-21 RX ORDER — SODIUM CHLORIDE, SODIUM LACTATE, POTASSIUM CHLORIDE, CALCIUM CHLORIDE 600; 310; 30; 20 MG/100ML; MG/100ML; MG/100ML; MG/100ML
INJECTION, SOLUTION INTRAVENOUS CONTINUOUS PRN
Status: DISCONTINUED | OUTPATIENT
Start: 2021-07-21 | End: 2021-07-21 | Stop reason: SURG

## 2021-07-21 RX ORDER — PROPOFOL 10 MG/ML
VIAL (ML) INTRAVENOUS CONTINUOUS PRN
Status: DISCONTINUED | OUTPATIENT
Start: 2021-07-21 | End: 2021-07-21 | Stop reason: SURG

## 2021-07-21 RX ORDER — BUPIVACAINE HCL/0.9 % NACL/PF 0.125 %
PLASTIC BAG, INJECTION (ML) EPIDURAL AS NEEDED
Status: DISCONTINUED | OUTPATIENT
Start: 2021-07-21 | End: 2021-07-21 | Stop reason: SURG

## 2021-07-21 RX ADMIN — HYDROMORPHONE HYDROCHLORIDE 1 MG: 1 INJECTION, SOLUTION INTRAMUSCULAR; INTRAVENOUS; SUBCUTANEOUS at 17:54

## 2021-07-21 RX ADMIN — DEXTROSE AND SODIUM CHLORIDE 100 ML/HR: 5; 450 INJECTION, SOLUTION INTRAVENOUS at 22:11

## 2021-07-21 RX ADMIN — Medication 200 MCG: at 16:38

## 2021-07-21 RX ADMIN — HYDROMORPHONE HYDROCHLORIDE 1 MG: 1 INJECTION, SOLUTION INTRAMUSCULAR; INTRAVENOUS; SUBCUTANEOUS at 11:53

## 2021-07-21 RX ADMIN — SODIUM CHLORIDE, PRESERVATIVE FREE 10 ML: 5 INJECTION INTRAVENOUS at 16:27

## 2021-07-21 RX ADMIN — HYDROMORPHONE HYDROCHLORIDE 1 MG: 1 INJECTION, SOLUTION INTRAMUSCULAR; INTRAVENOUS; SUBCUTANEOUS at 14:15

## 2021-07-21 RX ADMIN — HYDROMORPHONE HYDROCHLORIDE 0.5 MG: 1 INJECTION, SOLUTION INTRAMUSCULAR; INTRAVENOUS; SUBCUTANEOUS at 08:51

## 2021-07-21 RX ADMIN — HYDROMORPHONE HYDROCHLORIDE 1 MG: 1 INJECTION, SOLUTION INTRAMUSCULAR; INTRAVENOUS; SUBCUTANEOUS at 20:12

## 2021-07-21 RX ADMIN — SODIUM CHLORIDE, POTASSIUM CHLORIDE, SODIUM LACTATE AND CALCIUM CHLORIDE: 600; 310; 30; 20 INJECTION, SOLUTION INTRAVENOUS at 16:28

## 2021-07-21 RX ADMIN — HYDROMORPHONE HYDROCHLORIDE 0.5 MG: 1 INJECTION, SOLUTION INTRAMUSCULAR; INTRAVENOUS; SUBCUTANEOUS at 06:09

## 2021-07-21 RX ADMIN — HYDROMORPHONE HYDROCHLORIDE 1 MG: 1 INJECTION, SOLUTION INTRAMUSCULAR; INTRAVENOUS; SUBCUTANEOUS at 22:12

## 2021-07-21 RX ADMIN — SODIUM CHLORIDE, PRESERVATIVE FREE 10 ML: 5 INJECTION INTRAVENOUS at 20:13

## 2021-07-21 RX ADMIN — SODIUM CHLORIDE, POTASSIUM CHLORIDE, SODIUM LACTATE AND CALCIUM CHLORIDE 9 ML/HR: 600; 310; 30; 20 INJECTION, SOLUTION INTRAVENOUS at 16:26

## 2021-07-21 RX ADMIN — OXYCODONE HYDROCHLORIDE AND ACETAMINOPHEN 1 TABLET: 5; 325 TABLET ORAL at 17:56

## 2021-07-21 RX ADMIN — MIRTAZAPINE 15 MG: 15 TABLET, FILM COATED ORAL at 20:13

## 2021-07-21 RX ADMIN — PROPOFOL 100 MCG/KG/MIN: 10 INJECTION, EMULSION INTRAVENOUS at 16:32

## 2021-07-21 NOTE — CASE MANAGEMENT/SOCIAL WORK
Continued Stay Note  [facility]     Patient Name: Abhijit Aldrich  MRN: [id number]  Today's Date: 7/21/2021    Admit Date: 7/16/2021    Discharge Plan     Row Name 07/21/21 1614       Plan    Plan  discharge plan    Plan Comments  Plan is home with family to assist. Pt going back for ERCP today. CM will cont to follow    Final Discharge Disposition Code  01 - home or self-care        Discharge Codes    No documentation.       Expected Discharge Date and Time     Expected Discharge Date Expected Discharge Time    Jul 24, 2021             Elyssa Larry RN

## 2021-07-21 NOTE — PROGRESS NOTES
Clinical Nutrition   Reason For Visit: NPO/Clear liquid    Patient Name: Abhijit Aldrich  YOB: 1950  MRN: [id number]  Date of Encounter: 07/21/21 11:36 EDT  Admission date: 7/16/2021      -NPO/Clear Liquids x 5 days during this admission. RD notes plan for repeat ERCP today.    -Patient with severe, chronic malnutrition on admission, minimal PO intake x 1 week prior to admission, and now NPO/Clear liquids x 5 days. RD recommends temporary EN via small bore feeding tube if MD anticipates patient will not be able to eat solid foods after today's repeat ERCP.    -Will order snacks for patient between meals when on solid food diet.    Nutrition Assessment     Admission Problem List:  Abdominal pain/distension  BLE edema  Acute on chronic pancreatitis  Cirrhosis with ascites  Anemia  Severe, Chronic Malnutrition (dx per RD 7/17)      Applicable PMH:  PVD s/p aortic femoral bypass  Aortic aneurysm s/p repair  Tobacco  CAD  HTN  Chronic pancreatitis      Applicable medical tests/procedures since admission:  (7/18) abdominal MRI - concern for pancreatic head malignancy  (7/19) paracentesis - 2.5 L  (7/20) ERCP - abnormal mucosa at pylorus, pancreatic duct stent placed  (7/21) repeat ERCP - pending      Reported/Observed/Food/Nutrition Related History   7/19) Abdominal MRI results pending along with GOC/POC. Boost Breeze is ordered. Will continue to follow closely and implement interventions as appropriate. Note abdominal pain/distension continue per nsg doc.      7/17) Patient and family members present during visit. Patient reports he has been eating less than normal for quite some time now with resulting unintentional weight loss --- Per H&P the timeframe of this has been 3 years; per GI APRN note the timeframe has been 6-12 months. Exact time frame of decreased PO intake and unintentional weight loss is unclear. Patient states he weighed 126 lbs 7.5 weeks ago and that his UBW was 160 lbs. Has had minimal PO  intake during the past 1 week r/t abdominal pain/distension. Was really hungry yesterday and ate a full meal from a fried chicken restaurant. Finally starting to feel like eating. Has tried some of the Boost/Ensure supplements in the past, but has not been drinking any recently. Denies food allergies and difficulty chewing/swallowing.  Agreeable to orange/wildberry Boost Breeze. Agreeable to snacks between meals when appropriate PO diet ordered - cottage cheese and fruit, sandwich (peanut butter/jelly, roast beef, turkey, ham and cheese).      Anthropometrics   Height: 71 in  Weight: 117 lbs (standing weight 7/21 per ns doc)  BMI: 16.4  BMI classification: Underweight:<18.5kg/m2   IBW: 172 lbs    Per RD note (7/17):  UBW: ~160 lbs per patient; no weight hx in EMR  Weight change: Patient reports unintentional weight loss of 50 lbs - exact timeframe unclear as H&P states x 3 years and GI APRN note states 6-12 months.    Nutrition Focused Physical Exam (7/17)     Subcutaneous fat:  Orbital Severe   Buccal Severe   Tricep Severe   Ribs- thoracic and lumbar region, lower back, midaxillary line Unable to determine at this time     Muscle:  Temple/temporalis Severe   Clavicle/acromion- pectoralis, deltoid Severe   Shoulder- deltoid Severe   Scapula- trapezius, latissimus dorsi Unable to determine at this time   Interosseous- dorsal hand Unable to determine at this time   Thigh- quadriceps Severe   Calf- gastrocnemius Severe       Labs reviewed   Labs reviewed: Yes    Medications reviewed   Medications reviewed: Yes  Pertinent: kofi kilgore  GTT: 1/2 NS + D5% @ 100 ml/hr  PRN: [email or web address]    Needs Assessment (7/17)   Height used: 71 in  Weight used: 110 lbs/50 kg (actual wt)    Estimated Calories needs: ~1800 calories daily  30-35 kcal/kg = 0093-6143    Estimated Protein needs: ~88 g protein daily  1.5-2.0 g/kg =     Current Nutrition Prescription   PO: NPO Diet     Average PO intake: insufficient data    Nutrition  Diagnosis     7/17  Problem Malnutrition  (severe, chronic)   Etiology Chronic pancreatitis, abdominal pain/distension, poor appetite   Signs/Symptoms Severe fat loss, severe muscle wasting     7/17, 7/19, 7/21  Problem Inadequate oral intake   Etiology Chronic pancreatitis, abdominal pain/distension, poor appetite   Signs/Symptoms Pt reports minimal PO intake x 1 week prior to admission; NPO/Clear liquids x 5 days during this admission   Status: ongoing    Intervention   Intervention: Follow treatment progress, Care plan reviewed, Await begin PO    Goal:   General: Nutrition support treatment  PO: Initiate diet as medically appropriate   Additional goals: No further weight loss    Monitoring/Evaluation:   Monitoring/Evaluation: Per protocol, I&O, Pertinent labs, Weight, GI status, Symptoms, POC/GOC    Saba Barr RD  Time Spent: 15 min

## 2021-07-21 NOTE — PLAN OF CARE
Problem: Adult Inpatient Plan of Care  Goal: Plan of Care Review  Outcome: Ongoing, Progressing  Flowsheets  Taken 7/21/2021 0353 by Tyesha Cabrales RN  Progress: improving  Taken 7/20/2021 1526 by Valerie Lozada RN  Plan of Care Reviewed With: patient  Goal: Patient-Specific Goal (Individualized)  Outcome: Ongoing, Progressing  Goal: Absence of Hospital-Acquired Illness or Injury  Outcome: Ongoing, Progressing  Intervention: Identify and Manage Fall Risk  Recent Flowsheet Documentation  Taken 7/21/2021 0000 by Tyesha Cabrales RN  Safety Promotion/Fall Prevention:   activity supervised   assistive device/personal items within reach   clutter free environment maintained   fall prevention program maintained   nonskid shoes/slippers when out of bed   safety round/check completed  Taken 7/20/2021 2000 by Tyesha Cabrales RN  Safety Promotion/Fall Prevention:   activity supervised   assistive device/personal items within reach   clutter free environment maintained   fall prevention program maintained   nonskid shoes/slippers when out of bed   safety round/check completed  Intervention: Prevent Skin Injury  Recent Flowsheet Documentation  Taken 7/21/2021 0000 by Tyesha Cabrales RN  Body Position: position changed independently  Skin Protection:   adhesive use limited   incontinence pads utilized  Taken 7/20/2021 2000 by Tyesha Cabrales RN  Body Position: position changed independently  Skin Protection:   adhesive use limited   incontinence pads utilized  Intervention: Prevent Infection  Recent Flowsheet Documentation  Taken 7/21/2021 0000 by Tyesha Cabrales RN  Infection Prevention: rest/sleep promoted  Goal: Optimal Comfort and Wellbeing  Outcome: Ongoing, Progressing  Intervention: Provide Person-Centered Care  Recent Flowsheet Documentation  Taken 7/20/2021 2000 by Tyesha Cabrales RN  Trust Relationship/Rapport:   care explained   choices provided  Goal: Readiness for Transition of Care  Outcome: Ongoing, Progressing    Goal Outcome Evaluation:           Progress: improving

## 2021-07-21 NOTE — PLAN OF CARE
Goal Outcome Evaluation:  Plan of Care Reviewed With: patient        Progress: improving  Outcome Summary: patient ambulated 250 feet with SBA and rolling hastings for support, verbal cues for improved posture, several short standing rest breaks due to low back pain. Able to increase gait distance this date

## 2021-07-21 NOTE — ANESTHESIA PREPROCEDURE EVALUATION
Anesthesia Evaluation     Patient summary reviewed and Nursing notes reviewed   history of anesthetic complications:  NPO Solid Status: > 8 hours  NPO Liquid Status: > 2 hours           Airway   Mallampati: I  TM distance: >3 FB  Neck ROM: full  No difficulty expected  Dental    (+) poor dentition    Pulmonary    (+) a smoker Current Abstained day of surgery, COPD moderate, decreased breath sounds,   Cardiovascular   Exercise tolerance: poor (<4 METS)    Rhythm: regular  Rate: normal    (+) hypertension well controlled less than 2 medications, valvular problems/murmurs AI and MR, CAD, murmur, PVD,     ROS comment: EF 50%  AR, MR- MILD    Neuro/Psych  (+) numbness,     GI/Hepatic/Renal/Endo    (+)   liver disease history of elevated LFT,     Musculoskeletal     Abdominal   (-) obese    Abdomen: soft.   Substance History      OB/GYN          Other   arthritis,                      Anesthesia Plan    ASA 3     general     intravenous induction     Anesthetic plan, all risks, benefits, and alternatives have been provided, discussed and informed consent has been obtained with: patient.    Plan discussed with CRNA.

## 2021-07-21 NOTE — OP NOTE
ERCP summary  See ERCP report for details    Distal common bile duct stricture brushed and stented with 10 mm x 60 mm fully covered biliary Wallstent.  Stricture consistent with pancreatic neoplasm.  Superficial erosive duodenitis noted    Impression  Distal common bile duct stricture consistent with pancreatic neoplasm.  Cytology brushings pending.  Status post biliary sphincterotomy and biliary stent placement    Recommendation  Await cytology report

## 2021-07-21 NOTE — THERAPY TREATMENT NOTE
Patient Name: Abhijit Aldrich  : 1950    MRN: [id number]                              Today's Date: 2021       Admit Date: 2021    Visit Dx:     ICD-10-CM ICD-9-CM   1. Idiopathic acute pancreatitis without infection or necrosis  K85.00 577.0   2. Other cirrhosis of liver (CMS/HCC)  K74.69 571.5   3. Severe protein-calorie malnutrition (CMS/HCC)  E43 262   4. Pancreatic mass  K86.89 577.8     Patient Active Problem List   Diagnosis   • Acute pancreatitis   • Cirrhosis (CMS/HCC)   • CAD (coronary artery disease)   • Hypertension   • PVD (peripheral vascular disease) (CMS/HCC)   • Severe malnutrition (CMS/HCC)   • Anemia   • Weight loss   • Numbness of right anterior thigh     Past Medical History:   Diagnosis Date   • CAD (coronary artery disease)    • Cirrhosis (CMS/HCC)    • Hypertension    • PVD (peripheral vascular disease) (CMS/HCC)      Past Surgical History:   Procedure Laterality Date   • ABDOMINAL AORTIC ANEURYSM REPAIR     • AORTA BIFEMORAL BYPASS     • ERCP N/A 2021    Procedure: ENDOSCOPIC RETROGRADE CHOLANGIOPANCREATOGRAPHY WITH STENT PLACEMENT;  Surgeon: Fer Menezes MD;  Location: [facility] ENDOSCOPY;  Service: Gastroenterology;  Laterality: N/A;     General Information     Row Name 21 1127          Physical Therapy Time and Intention    Document Type  therapy note (daily note)  -AS     Mode of Treatment  physical therapy  -AS     Row Name 21 1127          General Information    Patient Profile Reviewed  yes  -AS     Existing Precautions/Restrictions  fall;seizures  -AS     Barriers to Rehab  none identified  -AS     Row Name 21 112          Cognition    Orientation Status (Cognition)  oriented x 4  -AS     Row Name 21 1127          Safety Issues, Functional Mobility    Impairments Affecting Function (Mobility)  balance;endurance/activity tolerance;pain;strength  -AS       User Key  (r) = Recorded By, (t) = Taken By, (c) = Cosigned By    Initials Name  Provider Type    AS Michaela Jacobsen PTA Physical Therapy Assistant        Mobility     Row Name 07/21/21 1127          Bed Mobility    Comment (Bed Mobility)  UIC  -AS     Row Name 07/21/21 1127          Transfers    Comment (Transfers)  verbla cues for hand placement  -AS     Row Name 07/21/21 1127          Sit-Stand Transfer    Sit-Stand [place] (Transfers)  verbal cues;standby assist;1 person assist  -AS     Assistive Device (Sit-Stand Transfers)  walker, front-wheeled  -AS     Row Name 07/21/21 1127          Gait/Stairs (Locomotion)    [place] Level (Gait)  verbal cues;standby assist;1 person assist  -AS     Assistive Device (Gait)  walker, front-wheeled  -AS     Distance in Feet (Gait)  250'  -AS     Deviations/Abnormal Patterns (Gait)  mara decreased;gait speed decreased  -AS     Bilateral Gait Deviations  forward flexed posture  -AS     Comment (Gait/Stairs)  patient ambulated 250 feet with SBA and rolling hastings for support, verbal cues for improved posture, several short standing rest breaks due to low back pain. Able to increase gait distance this date.  -AS       User Key  (r) = Recorded By, (t) = Taken By, (c) = Cosigned By    Initials Name Provider Type    AS Michaela Jacobsen PTA Physical Therapy Assistant        Obj/Interventions     Row Name 07/21/21 1129          Motor Skills    Therapeutic Exercise  knee;ankle  -AS     Row Name 07/21/21 1129          Knee (Therapeutic Exercise)    Knee (Therapeutic Exercise)  strengthening exercise  -AS     Knee Strengthening (Therapeutic Exercise)  bilateral;marching while seated;LAQ (long arc quad);sitting;10 repetitions  -AS     Row Name 07/21/21 1129          Ankle (Therapeutic Exercise)    Ankle (Therapeutic Exercise)  AROM (active range of motion)  -AS     Ankle Strengthening (Therapeutic Exercise)  bilateral;dorsiflexion;plantarflexion;sitting;10 repetitions  -AS       User Key  (r) = Recorded By, (t) = Taken By, (c) = Cosigned By     Initials Name Provider Type    AS Michaela Jacobsen PTA Physical Therapy Assistant        Goals/Plan    No documentation.       Clinical Impression     Row Name 07/21/21 1130          Pain    Additional Documentation  Pain Scale: Numbers Pre/Post-Treatment (Group)  -AS     Row Name 07/21/21 1130          Pain Scale: Numbers Pre/Post-Treatment    Pretreatment Pain Rating  7/10  -AS     Posttreatment Pain Rating  7/10  -AS     Pain Location - Side  Right  -AS     Pain Location  abdomen  -AS     Pain Intervention(s)  Ambulation/increased activity;Repositioned  -AS     Row Name 07/21/21 1130          Plan of Care Review    Plan of Care Reviewed With  patient  -AS     Progress  improving  -AS     Outcome Summary  patient ambulated 250 feet with SBA and rolling hastings for support, verbal cues for improved posture, several short standing rest breaks due to low back pain. Able to increase gait distance this date  -AS     Row Name 07/21/21 1130          Positioning and Restraints    Pre-Treatment Position  sitting in chair/recliner  -AS     Post Treatment Position  chair  -AS     In Chair  sitting;call light within reach;encouraged to call for assist;waffle cushion  -AS       User Key  (r) = Recorded By, (t) = Taken By, (c) = Cosigned By    Initials Name Provider Type    AS Michaela Jacobsen PTA Physical Therapy Assistant        Outcome Measures     Row Name 07/21/21 1130          How much help from another person do you currently need...    Turning from your back to your side while in flat bed without using bedrails?  4  -AS     Moving from lying on back to sitting on the side of a flat bed without bedrails?  4  -AS     Moving to and from a bed to a chair (including a wheelchair)?  4  -AS     Standing up from a chair using your arms (e.g., wheelchair, bedside chair)?  4  -AS     Climbing 3-5 steps with a railing?  3  -AS     To walk in hospital room?  3  -AS     AM-PAC 6 Clicks Score (PT)  22  -AS     Row Name  07/21/21 1130          Functional Assessment    Outcome Measure Options  AM-PAC 6 Clicks Basic Mobility (PT)  -AS       User Key  (r) = Recorded By, (t) = Taken By, (c) = Cosigned By    Initials Name Provider Type    AS Michaela Jacobsen PTA Physical Therapy Assistant                       Physical Therapy Education                 Title: PT OT SLP Therapies (In Progress)     Topic: Physical Therapy (In Progress)     Point: Mobility training (In Progress)     Learning Progress Summary           Patient Acceptance, E, NR by AS at 7/21/2021 1130    Acceptance, E, NR by AS at 7/20/2021 1121    Acceptance, E, VU by LM at 7/18/2021 1405                   Point: Home exercise program (In Progress)     Learning Progress Summary           Patient Acceptance, E, NR by AS at 7/21/2021 1130    Acceptance, E, NR by AS at 7/20/2021 1121                   Point: Precautions (In Progress)     Learning Progress Summary           Patient Acceptance, E, NR by AS at 7/21/2021 1130    Acceptance, E, NR by AS at 7/20/2021 1121    Acceptance, E, VU by LM at 7/18/2021 1405                               User Key     Initials Effective Dates Name Provider Type Discipline    AS 06/16/21 -  Michaela Jacobsen PTA Physical Therapy Assistant PT    LM 06/16/21 -  Omaira Dorado PT Physical Therapist PT              PT Recommendation and Plan     Plan of Care Reviewed With: patient  Progress: improving  Outcome Summary: patient ambulated 250 feet with SBA and rolling hastings for support, verbal cues for improved posture, several short standing rest breaks due to low back pain. Able to increase gait distance this date     Time Calculation:   PT Charges     Row Name 07/21/21 1131             Time Calculation    Start Time  1030  -AS      PT Received On  07/21/21  -AS      PT Goal Re-Cert Due Date  07/28/21  -AS         Timed Charges    90790 - PT Therapeutic Exercise Minutes  10  -AS      37626 - Gait Training Minutes   14  -AS         Total  Minutes    Timed Charges Total Minutes  24  -AS       Total Minutes  24  -AS        User Key  (r) = Recorded By, (t) = Taken By, (c) = Cosigned By    Initials Name Provider Type    AS Michaela Jacobsen PTA Physical Therapy Assistant        Therapy Charges for Today     Code Description Service Date Service Provider Modifiers Qty    88451837387 HC PT THER PROC EA 15 MIN 7/20/2021 Michaela Jacobsen, PTA GP 1    36036935317 HC GAIT TRAINING EA 15 MIN 7/20/2021 Michaela Jacobsen, ABDI GP 1    22861941968 HC PT THER PROC EA 15 MIN 7/21/2021 Michaela Jacobsen, ABDI GP 1    38622984492 HC GAIT TRAINING EA 15 MIN 7/21/2021 Michaela Jacobsen, ABDI GP 1          PT G-Codes  Outcome Measure Options: AM-PAC 6 Clicks Basic Mobility (PT)  AM-PAC 6 Clicks Score (PT): 22  AM-PAC 6 Clicks Score (OT): 24    Michaela Jacobsen PTA  7/21/2021

## 2021-07-21 NOTE — PROGRESS NOTES
[facility] Medicine Services  PROGRESS NOTE    Patient Name: Abhijit Aldrich  : 1950  MRN: [id number]    Date of Admission: 2021  Primary Care Physician: Nellie Ocasio MD    Subjective   Subjective     CC:  abd pain/weight loss    HPI:  Sitting in chair. Reports needing more pain control, will increase dilaudid. Working on housing plans for his nephew. Going back for ERCP again today    ROS:  Gen- No fevers, chills  CV- No chest pain, palpitations  Resp- No cough, dyspnea  GI- No N/V/D, +abd pain         Objective   Objective     Vital Signs:   Temp:  [97.2 °F (36.2 °C)-98 °F (36.7 °C)] 97.6 °F (36.4 °C)  Heart Rate:  [] 89  Resp:  [12-22] 20  BP: (104-147)/() 104/80  Flow (L/min):  [2-4] 2     Physical Exam:  GEN- pleasant, appears older than stated age and cachetic, sitting up in chair  HEENT- atraumatic, normocephalic, eomi, poor dentition  NECK- supple, trachea midline, no masses  RESP: ctab, normal effort  CV: no murmurs, s1/s2, rrr  GI: soft, epigastric TTP ,distension the same  MSK: no edema noted, spontaneous movement of all extremities  NEURO: alert, oriented, no focal deficits  SKIN: no rashes  PSYCH: appropriate mood and affect       Results Reviewed:  LAB RESULTS:      Lab 21  0514 21  0432 21  0546 21  0604 21  2153 21  1721   WBC 6.27 5.81 6.23 5.56  --  8.35   HEMOGLOBIN 12.3* 12.2* 13.2 12.6*  --  12.6*   HEMATOCRIT 37.2* 36.3* 38.8 36.9*  --  36.9*   PLATELETS 224 226 257 226  --  249   NEUTROS ABS 4.91 4.17 4.51 4.07  --  6.09   IMMATURE GRANS (ABS) 0.03 0.01 0.02 0.02  --  0.02   LYMPHS ABS 0.91 1.26 1.22 1.03  --  1.75   MONOS ABS 0.42 0.35 0.44 0.37  --  0.45   EOS ABS 0.00 0.01 0.02 0.05  --  0.02   MCV 99.7* 97.3* 96.3 97.6*  --  96.9   LACTATE  --   --   --   --   --  1.5   PROTIME  --   --   --  14.2 14.1  --          Lab 21  0514 21  0432 21  0546 21  0604 21  1729    SODIUM 139 135* 133* 133* 133*   POTASSIUM 5.1 3.4* 3.6 3.6 4.1   CHLORIDE 108* 101 97* 96* 92*   CO2 24.0 25.0 26.0 27.0 30.0*   ANION GAP 7.0 9.0 10.0 10.0 11.0   BUN 15 10 14 20 23   CREATININE 0.71* 0.51* 0.56* 0.65* 0.84   GLUCOSE 121* 95 104* 98 105*   CALCIUM 8.1* 8.1* 8.3* 8.0* 8.6   PHOSPHORUS  --   --   --  3.0  --    TSH  --   --   --  1.400  --          Lab 07/21/21  0514 07/19/21  0432 07/18/21  0546 07/16/21  1721   TOTAL PROTEIN 5.3* 5.2* 5.5* 6.3   ALBUMIN 2.80* 2.90* 3.00* 3.40*   GLOBULIN 2.5 2.3 2.5 2.9   ALT (SGPT) 23 23 24 28   AST (SGOT) 36 31 35 36   BILIRUBIN 0.3 0.4 0.5 0.4   ALK PHOS 147* 138* 147* 157*   LIPASE 450*  --   --  706*         Lab 07/17/21  0604 07/16/21  2153   PROTIME 14.2 14.1   INR 1.13 1.12         Lab 07/16/21  1721   CHOLESTEROL 118  115   LDL CHOL 53  51   HDL CHOL 30*  30*   TRIGLYCERIDES 214*  212*         Lab 07/17/21  0604   IRON 75   IRON SATURATION 28   TIBC 271*   TRANSFERRIN 182*   FERRITIN 162.40   FOLATE >20.00   VITAMIN B 12 704         Brief Urine Lab Results  (Last result in the past 365 days)      Color   Clarity   Blood   Leuk Est   Nitrite   Protein   CREAT   Urine HCG        07/16/21 1946 Yellow Clear Negative Negative Negative Negative               Microbiology Results Abnormal     Procedure Component Value - Date/Time    Body Fluid Culture - Body Fluid, Peritoneum [[id number]] Collected: 07/19/21 1520    Lab Status: Preliminary result Specimen: Body Fluid from Peritoneum Updated: 07/21/21 0612     Body Fluid Culture No growth at 2 days     Gram Stain Moderate (3+) WBCs seen      No organisms seen    COVID-19 and FLU A/B PCR - Swab, Nasopharynx [[id number]]  (Normal) Collected: 07/16/21 1934    Lab Status: Final result Specimen: Swab from Nasopharynx Updated: 07/16/21 2012     COVID19 Not Detected     Influenza A PCR Not Detected     Influenza B PCR Not Detected    Narrative:      Fact sheet for providers:  https://www.fda.gov/media/466791/download    Fact sheet for patients: https://www.fda.gov/media/492028/download    Test performed by PCR.          FL ERCP pancreatic and biliary ducts    Result Date: 7/21/2021  EXAMINATION: FL ERCP PANCREATIC AND BILIARY DUCTS-  INDICATION: ercp; K85.00-Idiopathic acute pancreatitis without necrosis or infection; K74.69-Other cirrhosis of liver; E43-Unspecified severe protein-calorie malnutrition  COMPARISON: NONE  FINDINGS: 1 minute 32 seconds of fluoroscopy time provided with one image saved during ERCP      Impression: 1 minute 32 seconds of fluoroscopy time provided with one image saved during ERCP  This report was finalized on 7/21/2021 8:28 AM by Apollo Winters.        Results for orders placed during the hospital encounter of 07/16/21    Adult Transthoracic Echo Complete w/ Color, Spectral and Contrast if necessary per protocol    Interpretation Summary  · The study is technically difficult for diagnosis with poor acoustic windows. The quality of the study is extremely limited due to limited views obtained, patient body habitus and patient positioning.  · Left ventricular ejection fraction appears to be 51 - 55%. Left ventricular systolic function is normal.  · There is calcification of the aortic valve.  · Mild aortic valve regurgitation is present.  · Mild mitral valve regurgitation is present.      I have reviewed the medications:  Scheduled Meds:aspirin, 81 mg, Oral, Daily  heparin (porcine), 5,000 Units, Subcutaneous, Q8H  mirtazapine, 15 mg, Oral, Nightly  nicotine, 1 patch, Transdermal, Q24H  pancrelipase (Lip-Prot-Amyl), 24,000 units of lipase, Oral, TID With Meals  sodium chloride, 10 mL, Intravenous, Q12H      Continuous Infusions:dextrose 5 % and sodium chloride 0.45 %, 100 mL/hr, Last Rate: 100 mL/hr (07/20/21 0700)      PRN Meds:.HYDROmorphone  •  HYDROmorphone  •  melatonin  •  [DISCONTINUED] Morphine **AND** naloxone  •  nicotine polacrilex  •  ondansetron  •   oxyCODONE-acetaminophen  •  Sodium Chloride (PF)  •  sodium chloride    Assessment/Plan   Assessment & Plan     Active Hospital Problems    Diagnosis  POA   • **Acute pancreatitis [K85.90]  Yes   • Cirrhosis (CMS/HCC) [K74.60]  Unknown   • CAD (coronary artery disease) [I25.10]  Unknown   • Hypertension [I10]  Unknown   • PVD (peripheral vascular disease) (CMS/HCC) [I73.9]  Unknown   • Severe malnutrition (CMS/HCC) [E43]  Unknown   • Anemia [D64.9]  Unknown   • Weight loss [R63.4]  Unknown   • Numbness of right anterior thigh [R20.0]  Unknown      Resolved Hospital Problems   No resolved problems to display.        Brief Hospital Course to date:  Abhijit Aldrich is a 71-year-old male patient with a PMH significant for PVD status post aortic femoral bypass (per patient), history of aortic aneurysm status post repair, tobacco abuse, chronic pancreatitis who presents to the ED due to abdominal pain found to have acute pancreatitis.    This patient's problems and plans were partially entered by my partner and updated as appropriate by me 07/21/21.         Acute on chronic pancreatitis  Cirrhosis with ascites and abd varices on CT imaging  Concern for possible malignancy and ?pancreatic head mass   Elevated CA 19-9  -GI consulted, appreciate assistance, MRCP done with abnl biliary dilatation and irregular contour with double duct sign of CBD/main pancreatic duct concerning for pancreatic head lesion  --noted  significantly elevated- concerning for malignancy   -[facility] GB done 7/17 with mod/large volume ascites and evidence of liver cirrhosis  -Morphine as needed for pain, have also added oral percocet per patient request   -Advance diet as tolerated, nutrition consult   -Reports alcohol use remotely but reports none in years  -Continue home Creon  --GI has ordered further lab studies including AFP, hep A/B serologies- pending  --s/p paracentesis 7/19 with 2.5 L removed, cytology/cx pending, not c/w SBP  ---s/p ERCP  7/20 with biliary brushings and stent placement. Unable to cannulate CBD, PD stent placement done, planning to repeat exam today      Severe protein calorie malnutrition  Progressive weight loss  -Mildly decreased albumin  -BMI 14.37  -Banana bag x3 days  -Nutrition consult     Anemia  -monitor     Numbness to the anterior thigh  -PT/OT  -monitor     CAD  PVD  Hypertension  -Hold spironolactone, Lasix while giving IVFs  -Continue home aspirin    DVT prophylaxis:  Medical DVT prophylaxis orders are present.       Disposition: I expect the patient to be discharged pending further GI w/u    CODE STATUS:   Code Status and Medical Interventions:   Ordered at: 07/16/21 2038     Level Of Support Discussed With:    Patient     Code Status:    CPR     Medical Interventions (Level of Support Prior to Arrest):    Full       Maine Dominguez MD  07/21/21

## 2021-07-22 LAB
ALBUMIN SERPL-MCNC: 2.9 G/DL (ref 3.5–5.2)
ALBUMIN/GLOB SERPL: 1.2 G/DL
ALP SERPL-CCNC: 150 U/L (ref 39–117)
ALT SERPL W P-5'-P-CCNC: 26 U/L (ref 1–41)
ANION GAP SERPL CALCULATED.3IONS-SCNC: 11 MMOL/L (ref 5–15)
AST SERPL-CCNC: 39 U/L (ref 1–40)
BACTERIA FLD CULT: NORMAL
BASOPHILS # BLD AUTO: 0.01 10*3/MM3 (ref 0–0.2)
BASOPHILS NFR BLD AUTO: 0.1 % (ref 0–1.5)
BILIRUB SERPL-MCNC: 0.5 MG/DL (ref 0–1.2)
BUN SERPL-MCNC: 17 MG/DL (ref 8–23)
BUN/CREAT SERPL: 23.9 (ref 7–25)
CALCIUM SPEC-SCNC: 8.1 MG/DL (ref 8.6–10.5)
CHLORIDE SERPL-SCNC: 103 MMOL/L (ref 98–107)
CO2 SERPL-SCNC: 22 MMOL/L (ref 22–29)
CREAT SERPL-MCNC: 0.71 MG/DL (ref 0.76–1.27)
DEPRECATED RDW RBC AUTO: 49 FL (ref 37–54)
EOSINOPHIL # BLD AUTO: 0.02 10*3/MM3 (ref 0–0.4)
EOSINOPHIL NFR BLD AUTO: 0.2 % (ref 0.3–6.2)
ERYTHROCYTE [DISTWIDTH] IN BLOOD BY AUTOMATED COUNT: 13.5 % (ref 12.3–15.4)
GFR SERPL CREATININE-BSD FRML MDRD: 110 ML/MIN/1.73
GLOBULIN UR ELPH-MCNC: 2.4 GM/DL
GLUCOSE SERPL-MCNC: 80 MG/DL (ref 65–99)
GRAM STN SPEC: NORMAL
GRAM STN SPEC: NORMAL
HCT VFR BLD AUTO: 37.9 % (ref 37.5–51)
HGB BLD-MCNC: 12.5 G/DL (ref 13–17.7)
IMM GRANULOCYTES # BLD AUTO: 0.04 10*3/MM3 (ref 0–0.05)
IMM GRANULOCYTES NFR BLD AUTO: 0.5 % (ref 0–0.5)
LYMPHOCYTES # BLD AUTO: 1.49 10*3/MM3 (ref 0.7–3.1)
LYMPHOCYTES NFR BLD AUTO: 18.5 % (ref 19.6–45.3)
MCH RBC QN AUTO: 32.7 PG (ref 26.6–33)
MCHC RBC AUTO-ENTMCNC: 33 G/DL (ref 31.5–35.7)
MCV RBC AUTO: 99.2 FL (ref 79–97)
MONOCYTES # BLD AUTO: 0.42 10*3/MM3 (ref 0.1–0.9)
MONOCYTES NFR BLD AUTO: 5.2 % (ref 5–12)
NEUTROPHILS NFR BLD AUTO: 6.06 10*3/MM3 (ref 1.7–7)
NEUTROPHILS NFR BLD AUTO: 75.5 % (ref 42.7–76)
NRBC BLD AUTO-RTO: 0 /100 WBC (ref 0–0.2)
PLATELET # BLD AUTO: 229 10*3/MM3 (ref 140–450)
PMV BLD AUTO: 10.1 FL (ref 6–12)
POTASSIUM SERPL-SCNC: 3.9 MMOL/L (ref 3.5–5.2)
PROT SERPL-MCNC: 5.3 G/DL (ref 6–8.5)
RBC # BLD AUTO: 3.82 10*6/MM3 (ref 4.14–5.8)
SODIUM SERPL-SCNC: 136 MMOL/L (ref 136–145)
WBC # BLD AUTO: 8.04 10*3/MM3 (ref 3.4–10.8)

## 2021-07-22 PROCEDURE — 99222 1ST HOSP IP/OBS MODERATE 55: CPT | Performed by: INTERNAL MEDICINE

## 2021-07-22 PROCEDURE — 99233 SBSQ HOSP IP/OBS HIGH 50: CPT | Performed by: INTERNAL MEDICINE

## 2021-07-22 PROCEDURE — 25010000002 HYDROMORPHONE 1 MG/ML SOLUTION: Performed by: INTERNAL MEDICINE

## 2021-07-22 PROCEDURE — 99232 SBSQ HOSP IP/OBS MODERATE 35: CPT | Performed by: NURSE PRACTITIONER

## 2021-07-22 PROCEDURE — 80053 COMPREHEN METABOLIC PANEL: CPT | Performed by: INTERNAL MEDICINE

## 2021-07-22 PROCEDURE — 85025 COMPLETE CBC W/AUTO DIFF WBC: CPT | Performed by: INTERNAL MEDICINE

## 2021-07-22 RX ORDER — FUROSEMIDE 40 MG/1
40 TABLET ORAL DAILY
Status: DISCONTINUED | OUTPATIENT
Start: 2021-07-22 | End: 2021-07-23

## 2021-07-22 RX ORDER — OXYCODONE HYDROCHLORIDE AND ACETAMINOPHEN 5; 325 MG/1; MG/1
2 TABLET ORAL EVERY 6 HOURS PRN
Status: DISCONTINUED | OUTPATIENT
Start: 2021-07-22 | End: 2021-07-22

## 2021-07-22 RX ORDER — OXYCODONE AND ACETAMINOPHEN 10; 325 MG/1; MG/1
2 TABLET ORAL EVERY 4 HOURS PRN
Status: DISCONTINUED | OUTPATIENT
Start: 2021-07-22 | End: 2021-07-23

## 2021-07-22 RX ADMIN — OXYCODONE HYDROCHLORIDE AND ACETAMINOPHEN 2 TABLET: 10; 325 TABLET ORAL at 11:01

## 2021-07-22 RX ADMIN — FUROSEMIDE 40 MG: 40 TABLET ORAL at 16:59

## 2021-07-22 RX ADMIN — SODIUM CHLORIDE, PRESERVATIVE FREE 10 ML: 5 INJECTION INTRAVENOUS at 08:10

## 2021-07-22 RX ADMIN — MIRTAZAPINE 15 MG: 15 TABLET, FILM COATED ORAL at 20:23

## 2021-07-22 RX ADMIN — PANCRELIPASE 24000 UNITS OF LIPASE: 24000; 76000; 120000 CAPSULE, DELAYED RELEASE PELLETS ORAL at 11:01

## 2021-07-22 RX ADMIN — PANCRELIPASE 24000 UNITS OF LIPASE: 24000; 76000; 120000 CAPSULE, DELAYED RELEASE PELLETS ORAL at 08:10

## 2021-07-22 RX ADMIN — SODIUM CHLORIDE, PRESERVATIVE FREE 10 ML: 5 INJECTION INTRAVENOUS at 20:24

## 2021-07-22 RX ADMIN — OXYCODONE HYDROCHLORIDE AND ACETAMINOPHEN 2 TABLET: 10; 325 TABLET ORAL at 15:17

## 2021-07-22 RX ADMIN — HYDROMORPHONE HYDROCHLORIDE 1 MG: 1 INJECTION, SOLUTION INTRAMUSCULAR; INTRAVENOUS; SUBCUTANEOUS at 00:12

## 2021-07-22 RX ADMIN — HYDROMORPHONE HYDROCHLORIDE 1 MG: 1 INJECTION, SOLUTION INTRAMUSCULAR; INTRAVENOUS; SUBCUTANEOUS at 08:10

## 2021-07-22 RX ADMIN — Medication 5 MG: at 20:22

## 2021-07-22 RX ADMIN — OXYCODONE HYDROCHLORIDE AND ACETAMINOPHEN 2 TABLET: 10; 325 TABLET ORAL at 20:23

## 2021-07-22 RX ADMIN — ASPIRIN 81 MG CHEWABLE TABLET 81 MG: 81 TABLET CHEWABLE at 08:10

## 2021-07-22 RX ADMIN — PANCRELIPASE 24000 UNITS OF LIPASE: 24000; 76000; 120000 CAPSULE, DELAYED RELEASE PELLETS ORAL at 17:00

## 2021-07-22 RX ADMIN — HYDROMORPHONE HYDROCHLORIDE 1 MG: 1 INJECTION, SOLUTION INTRAMUSCULAR; INTRAVENOUS; SUBCUTANEOUS at 18:11

## 2021-07-22 RX ADMIN — HYDROMORPHONE HYDROCHLORIDE 1 MG: 1 INJECTION, SOLUTION INTRAMUSCULAR; INTRAVENOUS; SUBCUTANEOUS at 04:56

## 2021-07-22 NOTE — CONSULTS
HEMATOLOGY/ONCOLOGY INPATIENT CONSULTATION      REFERRING PHYSICIAN: Maine Dominguez MD    PRIMARY CARE PROVIDER: Nellie Ocasio MD    REASON FOR CONSULTATION: Pancreatic Mass      HISTORY OF PRESENT ILLNESS: 71-year-old patient who presents to the hospital with worsening abdominal pain.  He was noted to have pancreatitis.  He has decreased appetite and abdominal distention.  He has had progressive weight loss for the last 3 years.  Upon evaluation with imaging he was noted to have cirrhosis with ascites.  An MRCP shows abnormal biliary dilatation with the pancreatic head lesion.  He underwent an ERCP with Dr. Sandoval's with brushings taken.  I was asked to evaluate the patient for possible pancreatic cancer.  His CA 19-9 is about 325.  Patient lives in [facility] and would prefer all his treatment here in [place].      No Known Allergies    Past Medical History:   Diagnosis Date   • CAD (coronary artery disease)    • Cirrhosis (CMS/HCC)    • Hypertension    • PVD (peripheral vascular disease) (CMS/HCC)          Current Facility-Administered Medications:   •  aspirin chewable tablet 81 mg, 81 mg, Oral, Daily, Fer Menezes MD, 81 mg at 07/22/21 0810  •  heparin (porcine) 5000 UNIT/ML injection 5,000 Units, 5,000 Units, Subcutaneous, Q8H, Fer Menezes MD, 5,000 Units at 07/19/21 0503  •  HYDROmorphone (DILAUDID) injection 1 mg, 1 mg, Intravenous, Q8H PRN, Maine Dominguez MD  •  melatonin tablet 5 mg, 5 mg, Oral, Nightly PRN, Fer Menezes MD, 5 mg at 07/18/21 2331  •  mirtazapine (REMERON) tablet 15 mg, 15 mg, Oral, Nightly, Fer Menezes MD, 15 mg at 07/21/21 2013  •  [DISCONTINUED] Morphine sulfate (PF) injection 3 mg, 3 mg, Intravenous, Q3H PRN, 3 mg at 07/19/21 0800 **AND** naloxone (NARCAN) injection 0.4 mg, 0.4 mg, Intravenous, Q5 Min PRN, Fer Menezes MD  •  nicotine (NICODERM CQ) 21 MG/24HR patch 1 patch, 1 patch, Transdermal, Q24H, Fer Menezes MD  •  nicotine  polacrilex (COMMIT) lozenge 2 mg, 2 mg, Mouth/Throat, Q2H PRN, Fer Menezes MD  •  ondansetron (ZOFRAN) injection 4 mg, 4 mg, Intravenous, Q6H PRN, Fer Menezes MD, 4 mg at 07/20/21 2025  •  oxyCODONE-acetaminophen (PERCOCET)  MG per tablet 2 tablet, 2 tablet, Oral, Q4H PRN, Maine Dominguez MD, 2 tablet at 07/22/21 1101  •  pancrelipase (Lip-Prot-Amyl) (CREON) capsule 24,000 units of lipase, 24,000 units of lipase, Oral, TID With Meals, Fer Menezes MD, 24,000 units of lipase at 07/22/21 1101  •  Sodium Chloride (PF) 0.9 % 10 mL, 10 mL, Intravenous, PRN, Fre Menezes MD  •  sodium chloride 0.9 % flush 10 mL, 10 mL, Intravenous, Q12H, Fer Menezes MD, 10 mL at 07/22/21 0810  •  sodium chloride 0.9 % flush 10 mL, 10 mL, Intravenous, PRN, Fer Menezes MD    Past Surgical History:   Procedure Laterality Date   • ABDOMINAL AORTIC ANEURYSM REPAIR     • AORTA BIFEMORAL BYPASS     • ERCP N/A 7/20/2021    Procedure: ENDOSCOPIC RETROGRADE CHOLANGIOPANCREATOGRAPHY WITH STENT PLACEMENT;  Surgeon: Fer Menezes MD;  Location: [facility] ENDOSCOPY;  Service: Gastroenterology;  Laterality: N/A;   • ERCP N/A 7/21/2021    Procedure: ENDOSCOPIC RETROGRADE CHOLANGIOPANCREATOGRAPHY WITH STENT PLACEMENT;  Surgeon: Jose Erickson MD;  Location: [facility] ENDOSCOPY;  Service: Gastroenterology;  Laterality: N/A;       Social History     Socioeconomic History   • Marital status:      Spouse name: Not on file   • Number of children: Not on file   • Years of education: Not on file   • Highest education level: Not on file   Tobacco Use   • Smoking status: Current Every Day Smoker     Packs/day: 1.00   • Smokeless tobacco: Never Used   Substance and Sexual Activity   • Alcohol use: Not Currently   • Drug use: Yes     Types: Marijuana   • Sexual activity: Not Currently       Family History   Problem Relation Age of Onset   • No Known Problems Mother        Oncology/Hematology History    No history  exists.         REVIEW OF SYSTEMS:  A 14 point review of systems was performed and is negative except as noted below.    Review of Systems   Constitutional: Positive for appetite change, fatigue and unexpected weight change.   HENT:  Negative.    Eyes: Negative.    Respiratory: Negative.    Cardiovascular: Negative.    Gastrointestinal: Positive for abdominal distention and abdominal pain.   Endocrine: Negative.    Genitourinary: Negative.     Musculoskeletal: Negative.    Skin: Negative.    Neurological: Negative.    Hematological: Negative.    Psychiatric/Behavioral: Negative.          Objective     Vitals:    07/22/21 0458 07/22/21 0803 07/22/21 0807 07/22/21 1054   BP:  (!) 73/60 101/64 105/75   BP Location:  Right arm  Left arm   Patient Position:  Sitting  Sitting   Pulse:  98  93   Resp:  16  16   Temp:  97.5 °F (36.4 °C)  98.9 °F (37.2 °C)   TempSrc:  Oral  Oral   SpO2:    96%   Weight: 54.3 kg (119 lb 9.9 oz)      Height:                       Temp:  [97 °F (36.1 °C)-99.9 °F (37.7 °C)] 98.9 °F (37.2 °C)     Performance Status: 1    Physical Exam    General: Thin male in no acute distress  HEENT: sclerae anicteric, oropharynx clear  Lymphatics: no cervical, supraclavicular, or axillary adenopathy  Cardiovascular: regular rate and rhythm, no murmurs  Lungs: clear to auscultation bilaterally  Abdomen: soft, nontender, nondistended.  No palpable organomegaly  Extremities: no lower extremity edema  Skin: no rashes, lesions, bruising, or petechiae      LABS:    Lab Results   Component Value Date    HGB 12.5 (L) 07/22/2021    HCT 37.9 07/22/2021    MCV 99.2 (H) 07/22/2021     07/22/2021    WBC 8.04 07/22/2021    NEUTROABS 6.06 07/22/2021    LYMPHSABS 1.49 07/22/2021    MONOSABS 0.42 07/22/2021    EOSABS 0.02 07/22/2021    BASOSABS 0.01 07/22/2021     Lab Results   Component Value Date    GLUCOSE 80 07/22/2021    BUN 17 07/22/2021    CREATININE 0.71 (L) 07/22/2021     07/22/2021    K 3.9 07/22/2021      07/22/2021    CO2 22.0 07/22/2021    CALCIUM 8.1 (L) 07/22/2021    PROTEINTOT 5.3 (L) 07/22/2021    ALBUMIN 2.90 (L) 07/22/2021    BILITOT 0.5 07/22/2021    ALKPHOS 150 (H) 07/22/2021    AST 39 07/22/2021    ALT 26 07/22/2021         IMAGING    Adult Transthoracic Echo Complete w/ Color, Spectral and Contrast if necessary per protocol    Result Date: 7/17/2021  · The study is technically difficult for diagnosis with poor acoustic windows. The quality of the study is extremely limited due to limited views obtained, patient body habitus and patient positioning. · Left ventricular ejection fraction appears to be 51 - 55%. Left ventricular systolic function is normal. · There is calcification of the aortic valve. · Mild aortic valve regurgitation is present. · Mild mitral valve regurgitation is present.      CT Abdomen Pelvis With Contrast    Result Date: 7/16/2021  CT Abdomen Pelvis W INDICATION: Generalized abdominal pain with pancreatitis and cirrhosis TECHNIQUE: CT of the abdomen and pelvis with IV contrast. Coronal and sagittal reconstructions were obtained.  Radiation dose reduction techniques included automated exposure control or exposure modulation based on body size. Count of known CT and cardiac nuc med studies performed in previous 12 months: 0. COMPARISON: None available. FINDINGS: Limited exam. Abdomen: Multiple small noncalcified nodules are seen at the lung bases. These may be chronic. There is ascites with cirrhosis. The pancreas is not well evaluated and there may be chronic prominence to the pancreatic duct. Pelvis: The patient appears cachectic with diffuse anasarca.  here is an S-shaped scoliosis. There is multilevel degenerative change involving the spine. There has been prior left hip arthroplasty.     1. There is cirrhosis with ascites. The gallbladder is somewhat distended, and it is uncertain if there may be some inflammatory change around the pancreas. This area is of limited  evaluation. 2. There are small noncalcified nodules at the lung bases. Consider obtaining prior chest CT if available for comparison. Signer Name: Tsering Frias MD  Signed: 7/16/2021 9:52 PM  Workstation Name: [id number]  [facility] ERCP pancreatic and biliary ducts    Result Date: 7/21/2021  EXAMINATION: [place] ERCP PANCREATIC AND BILIARY DUCTS-  INDICATION: ercp; K85.00-Idiopathic acute pancreatitis without necrosis or infection; K74.69-Other cirrhosis of liver; E43-Unspecified severe protein-calorie malnutrition; K86.89-Other specified diseases of pancreas  COMPARISON: NONE  FINDINGS: 1 minute 10 seconds of fluoroscopy time provided with 3 images stored during ERCP      1 minute 10 seconds of fluoroscopy time provided with 3 images stored during ERCP  This report was finalized on 7/21/2021 5:54 PM by Apollo Winters.      FL ERCP pancreatic and biliary ducts    Result Date: 7/21/2021  EXAMINATION: FL ERCP PANCREATIC AND BILIARY DUCTS-  INDICATION: ercp; K85.00-Idiopathic acute pancreatitis without necrosis or infection; K74.69-Other cirrhosis of liver; E43-Unspecified severe protein-calorie malnutrition  COMPARISON: NONE  FINDINGS: 1 minute 32 seconds of fluoroscopy time provided with one image saved during ERCP      1 minute 32 seconds of fluoroscopy time provided with one image saved during ERCP  This report was finalized on 7/21/2021 8:28 AM by Apollo Winters.      US Gallbladder    Result Date: 7/18/2021  EXAMINATION: US GALLBLADDER-  INDICATION: eval for stones; K85.00-Idiopathic acute pancreatitis without necrosis or infection  COMPARISON: NONE  FINDINGS: Sonographic grayscale and color Doppler evaluation of the right upper quadrant demonstrates  Increased echogenicity and nodular liver surface contour compatible with parenchymal disease and cirrhosis. No suspicious focal hepatic lesions or biliary ductal dilatation. Cholelithiasis is present with some small stones and sludge,  without definite wall thickening. Unremarkable 5 mm common bile duct.  Right kidney measures 9.7 cm in length, without apparent mass or hydronephrosis. Normal color Doppler flow.      Redemonstrated moderate to large volume ascites and abnormal appearance of the hepatic parenchyma compatible with parenchymal disease if not kartik cirrhosis.  Cholelithiasis is present without definite changes of cholecystitis.  This report was finalized on 7/18/2021 7:52 AM by Apollo Winters.      MRI abdomen w wo contrast mrcp    Result Date: 7/19/2021  EXAMINATION: MRI ABDOMEN W WO CONTRAST MRCP-  INDICATION: Concerns for pancreatic head mass with PD dilation; K85.00-Idiopathic acute pancreatitis without necrosis or infection.  TECHNIQUE: Multiplanar MRI of the abdomen with and without intravenous contrast administration. MRCP sequences performed including 3-D T2 space coronal sequence performed for analysis.    COMPARISON: Ultrasound right upper quadrant 07/17/2021 and CT abdomen and pelvis 07/16/2021.  FINDINGS: Lung bases limited in evaluation without gross abnormality or effusion. Liver demonstrates abnormal low signal and heterogeneous appearance remaining consistent with previously identified parenchymal findings along with perihepatic ascites. Postcontrast sequences in particular arterial phase imaging without arterial hyperenhancement or evidence for washout characteristics at any focal area within the liver.   Gallbladder is distended without filling defect clearly evident of cholelithiasis. Intrahepatic dilatation greatest on the left along with irregularities and areas of potential stricturing or narrowing contour in the distal CBD for example series 27 image 29 and 30 without distinct filling defect to suggest choledocholithiasis.  No pancreas divisum anatomy although at least mild to moderately enlarged main pancreatic duct for example series 12 image 14 extending to the distal body of the pancreas up to 4 to 5 mm of  abnormal increased or dilated caliber with indistinct margins of early arterial phase imaging pancreatic head concerning for infiltrating or underlying mass lesion difficult to measure as this is somewhat indistinct for example series 12 image 15 and series 33 image 53 and ill-defined margins in the peripancreatic region concerning for potential secondary inflammatory process or pancreatitis without focal fluid collection or cystic lesion otherwise evident.  Spleen is unremarkable. Adrenals without distinct nodule. Kidneys without hydronephrosis or hydroureter. No bulky retroperitoneal adenopathy. Patent nonaneurysmal abdominal aorta. Portal veins are poorly visualized on delayed phase sequencing and limited for evaluation. Visualized GI tract unremarkable with motion degradation, however no gross disproportion dilatation with at least small to moderate volume abdominopelvic ascites in the perihepatic and perisplenic regions.         Abnormal biliary dilatation and irregular contours along with a double duct sign of dilated CBD and main pancreatic duct extending into the distal body and tail of the pancreas concerning for pancreatic head lesion despite measurable or distinct identifiable mass there is somewhat indistinct margins and signal on early arterial phase imaging with adenocarcinoma of the pancreas of highest consideration given overall appearance with a prominent left-sided intrahepatic biliary dilatation and gallbladder distention. Perisplenic and perihepatic ascites with at least some heterogeneous signal of the liver parenchyma. No splenomegaly is evident and no distinct nodular contours of the hepatic parenchyma to definitively characterize cirrhosis on this examination along with lack of hepatic lesion evident. Ill-defined enhancement better seen on uncinate process and inferior head of the pancreas comparison CT examination from 07/16/2021 series 2 image 36 of around 3 cm focus concerning for  pancreatic lesion with tissue sampling considered for further evaluation.  D:  07/18/2021 E:  07/19/2021   This report was finalized on 7/19/2021 12:19 PM by Dr. Emmanuel Barry.      CT Guided Paracentesis    Result Date: 7/21/2021  Clinical Indication:  72 year-old male with history of chronic pancreatitis and large amount of ascites.   Procedure:  CT guided paracentesis   :  Dr. Felix   Complication:  None apparent.   Technique:  The right mid-abdominal region was prepped and draped in the usual/sterile fashion.  Local anesthesia with Lidocaine was performed.  Utilizing CT localization, a 8.5 Arabic drainage catheter placed into ascitic fluid centered in the right paracolic gutter.  A sample fluid was sent for laboratory analysis, per the referring service.  Approximately 2500 cc's of ascitic fluid was drained off.  The drainage catheter was removed and a sterile dressing was applied to the access site.  The patient tolerated the procedure well without complication.   Impression:  Technically successful CT guided paracentesis.      ASSESSMENT/PLAN:    1.  Pancreatic head mass likely a primary malignancy.  Does not appear to be metastatic.  I feel that he is a marginal candidate for surgery with a Whipple procedure.  Though I would like surgical oncology at  to evaluate the patient to let us know if surgery is a possibility on not.  If surgery is not possible then I would recommend chemotherapy though the possibility of CyberKnife would also be considered.  Due to his underlying cirrhosis chemotherapy would be a little difficult from a side effect standpoint.  For CyberKnife he will need fiducials placed.    2.  Cirrhosis with ascites.  This is going to be problematic from both the surgical and chemotherapy standpoint.    I like to see the patient as an outpatient in about 3 weeks in the meantime he should have been evaluated for possible surgery at .    Melba Chamberlain MD    7/22/2021

## 2021-07-22 NOTE — PROGRESS NOTES
[facility] Medicine Services  PROGRESS NOTE    Patient Name: Abhijit Aldrich  : 1950  MRN: [id number]    Date of Admission: 2021  Primary Care Physician: Nellie Ocasio MD    Subjective   Subjective     CC:  abd pain/weight loss    HPI:  Still having pain but dilaudid is working well. D/w him results of ERCP, probable malignancy and d/w him need to wean to oral pain control so we can facilitate home soon. He has family coming to see him today. D/w him oncology consultation. He is appreciative of all care and in agreement.    ROS:  Gen- No fevers, chills  CV- No chest pain, palpitations  Resp- No cough, dyspnea  GI- No N/V/D, +abd pain         Objective   Objective     Vital Signs:   Temp:  [97 °F (36.1 °C)-99.9 °F (37.7 °C)] 97.5 °F (36.4 °C)  Heart Rate:  [64-98] 98  Resp:  [16-18] 16  BP: ()/(60-99) 101/64  Flow (L/min):  [4] 4     Physical Exam:  GEN- pleasant, appears older than stated age and cachetic, sitting up in chair  HEENT- atraumatic, normocephalic, eomi, poor dentition, temporal wasting  NECK- supple, trachea midline, no masses  RESP: ctab, normal effort  MSK: no edema noted, spontaneous movement of all extremities  NEURO: alert, oriented, no focal deficits  SKIN: no rashes  PSYCH: appropriate mood and affect       Results Reviewed:  LAB RESULTS:      Lab 21  0438 21  0514 21  0432 21  0546 21  0604 21  2153 21  1721 21  1721   WBC 8.04 6.27 5.81 6.23 5.56  --    < > 8.35   HEMOGLOBIN 12.5* 12.3* 12.2* 13.2 12.6*  --    < > 12.6*   HEMATOCRIT 37.9 37.2* 36.3* 38.8 36.9*  --    < > 36.9*   PLATELETS 229 224 226 257 226  --    < > 249   NEUTROS ABS 6.06 4.91 4.17 4.51 4.07  --    < > 6.09   IMMATURE GRANS (ABS) 0.04 0.03 0.01 0.02 0.02  --    < > 0.02   LYMPHS ABS 1.49 0.91 1.26 1.22 1.03  --    < > 1.75   MONOS ABS 0.42 0.42 0.35 0.44 0.37  --    < > 0.45   EOS ABS 0.02 0.00 0.01 0.02 0.05  --    < > 0.02    MCV 99.2* 99.7* 97.3* 96.3 97.6*  --    < > 96.9   LACTATE  --   --   --   --   --   --   --  1.5   PROTIME  --   --   --   --  14.2 14.1  --   --     < > = values in this interval not displayed.         Lab 07/22/21  0438 07/21/21  0514 07/19/21  0432 07/18/21  0546 07/17/21  0604   SODIUM 136 139 135* 133* 133*   POTASSIUM 3.9 5.1 3.4* 3.6 3.6   CHLORIDE 103 108* 101 97* 96*   CO2 22.0 24.0 25.0 26.0 27.0   ANION GAP 11.0 7.0 9.0 10.0 10.0   BUN 17 15 10 14 20   CREATININE 0.71* 0.71* 0.51* 0.56* 0.65*   GLUCOSE 80 121* 95 104* 98   CALCIUM 8.1* 8.1* 8.1* 8.3* 8.0*   PHOSPHORUS  --   --   --   --  3.0   TSH  --   --   --   --  1.400         Lab 07/22/21  0438 07/21/21  0514 07/19/21  0432 07/18/21  0546 07/16/21  1721   TOTAL PROTEIN 5.3* 5.3* 5.2* 5.5* 6.3   ALBUMIN 2.90* 2.80* 2.90* 3.00* 3.40*   GLOBULIN 2.4 2.5 2.3 2.5 2.9   ALT (SGPT) 26 23 23 24 28   AST (SGOT) 39 36 31 35 36   BILIRUBIN 0.5 0.3 0.4 0.5 0.4   ALK PHOS 150* 147* 138* 147* 157*   LIPASE  --  450*  --   --  706*         Lab 07/17/21  0604 07/16/21  2153   PROTIME 14.2 14.1   INR 1.13 1.12         Lab 07/16/21  1721   CHOLESTEROL 118  115   LDL CHOL 53  51   HDL CHOL 30*  30*   TRIGLYCERIDES 214*  212*         Lab 07/17/21  0604   IRON 75   IRON SATURATION 28   TIBC 271*   TRANSFERRIN 182*   FERRITIN 162.40   FOLATE >20.00   VITAMIN B 12 704         Brief Urine Lab Results  (Last result in the past 365 days)      Color   Clarity   Blood   Leuk Est   Nitrite   Protein   CREAT   Urine HCG        07/16/21 1946 Yellow Clear Negative Negative Negative Negative               Microbiology Results Abnormal     Procedure Component Value - Date/Time    Body Fluid Culture - Body Fluid, Peritoneum [[id number]] Collected: 07/19/21 1520    Lab Status: Final result Specimen: Body Fluid from Peritoneum Updated: 07/22/21 0603     Body Fluid Culture No growth at 3 days     Gram Stain Moderate (3+) WBCs seen      No organisms seen    COVID-19 and FLU A/B PCR  - Swab, Nasopharynx [[id number]]  (Normal) Collected: 07/16/21 1934    Lab Status: Final result Specimen: Swab from Nasopharynx Updated: 07/16/21 2012     COVID19 Not Detected     Influenza A PCR Not Detected     Influenza B PCR Not Detected    Narrative:      Fact sheet for providers: https://www.fda.gov/media/021878/download    Fact sheet for patients: https://www.fda.gov/media/007964/download    Test performed by PCR.          FL ERCP pancreatic and biliary ducts    Result Date: 7/21/2021  EXAMINATION: FL ERCP PANCREATIC AND BILIARY DUCTS-  INDICATION: ercp; K85.00-Idiopathic acute pancreatitis without necrosis or infection; K74.69-Other cirrhosis of liver; E43-Unspecified severe protein-calorie malnutrition; K86.89-Other specified diseases of pancreas  COMPARISON: NONE  FINDINGS: 1 minute 10 seconds of fluoroscopy time provided with 3 images stored during ERCP      Impression: 1 minute 10 seconds of fluoroscopy time provided with 3 images stored during ERCP  This report was finalized on 7/21/2021 5:54 PM by Apollo Winters.      FL ERCP pancreatic and biliary ducts    Result Date: 7/21/2021  EXAMINATION: FL ERCP PANCREATIC AND BILIARY DUCTS-  INDICATION: ercp; K85.00-Idiopathic acute pancreatitis without necrosis or infection; K74.69-Other cirrhosis of liver; E43-Unspecified severe protein-calorie malnutrition  COMPARISON: NONE  FINDINGS: 1 minute 32 seconds of fluoroscopy time provided with one image saved during ERCP      Impression: 1 minute 32 seconds of fluoroscopy time provided with one image saved during ERCP  This report was finalized on 7/21/2021 8:28 AM by Apollo Winters.        Results for orders placed during the hospital encounter of 07/16/21    Adult Transthoracic Echo Complete w/ Color, Spectral and Contrast if necessary per protocol    Interpretation Summary  · The study is technically difficult for diagnosis with poor acoustic windows. The quality of the study is extremely limited due to  limited views obtained, patient body habitus and patient positioning.  · Left ventricular ejection fraction appears to be 51 - 55%. Left ventricular systolic function is normal.  · There is calcification of the aortic valve.  · Mild aortic valve regurgitation is present.  · Mild mitral valve regurgitation is present.      I have reviewed the medications:  Scheduled Meds:aspirin, 81 mg, Oral, Daily  heparin (porcine), 5,000 Units, Subcutaneous, Q8H  mirtazapine, 15 mg, Oral, Nightly  nicotine, 1 patch, Transdermal, Q24H  pancrelipase (Lip-Prot-Amyl), 24,000 units of lipase, Oral, TID With Meals  sodium chloride, 10 mL, Intravenous, Q12H      Continuous Infusions:dextrose 5 % and sodium chloride 0.45 %, 100 mL/hr, Last Rate: 100 mL/hr (07/21/21 2211)  lactated ringers, 9 mL/hr, Last Rate: 9 mL/hr (07/21/21 1626)      PRN Meds:.HYDROmorphone  •  melatonin  •  [DISCONTINUED] Morphine **AND** naloxone  •  nicotine polacrilex  •  ondansetron  •  oxyCODONE-acetaminophen  •  Sodium Chloride (PF)  •  sodium chloride    Assessment/Plan   Assessment & Plan     Active Hospital Problems    Diagnosis  POA   • **Acute pancreatitis [K85.90]  Yes   • Pancreatic mass [K86.89]  Unknown   • Cirrhosis (CMS/HCC) [K74.60]  Unknown   • CAD (coronary artery disease) [I25.10]  Unknown   • Hypertension [I10]  Unknown   • PVD (peripheral vascular disease) (CMS/HCC) [I73.9]  Unknown   • Severe malnutrition (CMS/HCC) [E43]  Unknown   • Anemia [D64.9]  Unknown   • Weight loss [R63.4]  Unknown   • Numbness of right anterior thigh [R20.0]  Unknown      Resolved Hospital Problems   No resolved problems to display.        Brief Hospital Course to date:  Abhijit Aldrich is a 71-year-old male patient with a PMH significant for PVD status post aortic femoral bypass (per patient), history of aortic aneurysm status post repair, tobacco abuse, chronic pancreatitis who presents to the ED due to abdominal pain found to have acute pancreatitis.    This  patient's problems and plans were partially entered by my partner and updated as appropriate by me 07/22/21.         Acute on chronic pancreatitis  Cirrhosis with ascites and abd varices on CT imaging  New diagnosis of pancreatic malignancy  Elevated CA 19-9  -GI consulted, appreciate assistance, MRCP done with abnl biliary dilatation and irregular contour with double duct sign of CBD/main pancreatic duct concerning for pancreatic head lesion  --noted  significantly elevated- concerning for malignancy   -[facility] GB done 7/17 with mod/large volume ascites and evidence of liver cirrhosis  -Morphine as needed for pain, have also added oral percocet per patient request -- significant decrease in IV narcotics today while increasing oral percocet as a bridge to hopeful home soon.  -Advance diet as tolerated, nutrition consult   -Reports alcohol use remotely but reports none in years  -Continue home Creon  --GI has ordered further lab studies including AFP, hep A/B serologies- pending  --s/p paracentesis 7/19 with 2.5 L removed, cytology/cx pending, not c/w SBP  ---s/p ERCP 7/20 and again 7/21 with biliary brushings and stent placement. Unable to cannulate CBD, PD stent placement done on 7/20. 7/21 ERCP noted distal CBD stricture c/w pancreatic neoplasm, brushings sent, biliary spincherotomy done with biliary stent placement.   ---have d/w patient these findings, consult to oncology today to see if any more workup needs to be done inpatient and to facilitate outpatient needs for further treatment.      Severe protein calorie malnutrition  Progressive weight loss  -Mildly decreased albumin  -BMI 14.37  -Banana bag x3 days  -Nutrition consult     Anemia  -monitor     Numbness to the anterior thigh  -PT/OT  -monitor     CAD  PVD  Hypertension  -Hold spironolactone, Lasix while giving IVFs  -Continue home aspirin    DVT prophylaxis:  Medical DVT prophylaxis orders are present.       Disposition: I expect the patient to be  discharged pending further GI w/u/oncology w/u. Patient is hopeful to go home soon, will work on pain control today ans ask oncology to weigh in to make sure no more testing/imaging needs to be obtained here. Maybe home in next few days.    CODE STATUS:   Code Status and Medical Interventions:   Ordered at: 07/16/21 0208     Level Of Support Discussed With:    Patient     Code Status:    CPR     Medical Interventions (Level of Support Prior to Arrest):    Full       Maine Dominguez MD  07/22/21

## 2021-07-22 NOTE — PROGRESS NOTES
"GI Daily Progress Note  Subjective:    Chief Complaint: Follow-up abdominal pain    Patient is postop day 1 from ERCP with brushing and Wallstent deployment.  Patient doing well following this procedure with no postoperative pain, nausea, or vomiting.  Does not endorse any new or worsening GI symptoms.  Voices some fears associated with pending cancer diagnosis.  Does not endorse pain at time of exam.    Objective:    /75 (BP Location: Left arm, Patient Position: Sitting)   Pulse 93   Temp 98.9 °F (37.2 °C) (Oral)   Resp 16   Ht 180.3 cm (71\")   Wt 54.3 kg (119 lb 9.9 oz)   SpO2 96%   BMI 16.68 kg/m²     Physical Exam  Vitals and nursing note reviewed.   Constitutional:       Appearance: Normal appearance.      Comments: Pleasantly conversant, thin elderly male.   HENT:      Head: Normocephalic and atraumatic.   Eyes:      General: No scleral icterus.     Extraocular Movements: Extraocular movements intact.      Conjunctiva/sclera: Conjunctivae normal.      Pupils: Pupils are equal, round, and reactive to light.   Cardiovascular:      Rate and Rhythm: Normal rate and regular rhythm.      Pulses: Normal pulses.      Heart sounds: Normal heart sounds.   Pulmonary:      Effort: Pulmonary effort is normal. No respiratory distress.      Breath sounds: Normal breath sounds.   Abdominal:      General: Abdomen is flat. Bowel sounds are normal. There is no distension.      Palpations: Abdomen is soft. There is no mass.      Tenderness: There is no abdominal tenderness. There is no guarding or rebound.      Hernia: No hernia is present.   Skin:     General: Skin is warm and dry.      Capillary Refill: Capillary refill takes less than 2 seconds.      Coloration: Skin is not jaundiced or pale.   Neurological:      General: No focal deficit present.      Mental Status: He is alert and oriented to person, place, and time.   Psychiatric:         Mood and Affect: Mood normal.         Behavior: Behavior normal.         " Thought Content: Thought content normal.         Judgment: Judgment normal.         Lab  I have personally reviewed most recent cardiac tracings, lab results and radiology images and interpretations and agree with findings.     Lab Results   Component Value Date    WBC 8.04 07/22/2021    HGB 12.5 (L) 07/22/2021    HGB 12.3 (L) 07/21/2021    HGB 12.2 (L) 07/19/2021    MCV 99.2 (H) 07/22/2021     07/22/2021    INR 1.13 07/17/2021    INR 1.12 07/16/2021       Lab Results   Component Value Date    GLUCOSE 80 07/22/2021    BUN 17 07/22/2021    CREATININE 0.71 (L) 07/22/2021    EGFRIFNONA 110 07/22/2021    BCR 23.9 07/22/2021     07/22/2021    K 3.9 07/22/2021    CO2 22.0 07/22/2021    CALCIUM 8.1 (L) 07/22/2021    ALBUMIN 2.90 (L) 07/22/2021    ALKPHOS 150 (H) 07/22/2021    BILITOT 0.5 07/22/2021    ALT 26 07/22/2021    AST 39 07/22/2021     ERCP on 7/21/2021  >>> ERCP findings a distal common duct stricture consistent with pancreatic neoplasm with superficial erosive duodenitis noted.  Brushings obtained.  Wallstent deployed.    Assessment:      Acute pancreatitis    Cirrhosis (CMS/HCC)    CAD (coronary artery disease)    Hypertension    PVD (peripheral vascular disease) (CMS/HCC)    Severe malnutrition (CMS/HCC)    Anemia    Weight loss    Numbness of right anterior thigh    Pancreatic mass    Pancreatic head mass, status post ERCP distal duct stricture consistent with pancreatic neoplasm  Acute on chronic pancreatitis, related to above  Liver cirrhosis with ascites  Severe protein calorie malnutrition    Plan:  >>> Patient doing well following ERCP.   -We discussed ERCP findings that are consistent with pancreatic head neoplasm.  >>> will follow up with brushing results when available.   >>> Discussed with patient treatment options as he had discussed with oncology.  Will make referral to [facility] for their input in patient's case as a surgical intervention would alter treatment course.  >>>  Patient will need further imaging for staging as per oncology.    Okay to home with close outpatient follow-up once pain controlled.  Will sign off at this time.  Please call with any further questions or concerns.    Trent Caballero, LOWELL  07/22/21  12:54 EDT

## 2021-07-22 NOTE — PLAN OF CARE
Problem: Adult Inpatient Plan of Care  Goal: Plan of Care Review  Outcome: Ongoing, Progressing  Flowsheets (Taken 7/22/2021 0358)  Progress: improving  Plan of Care Reviewed With: patient  Goal: Patient-Specific Goal (Individualized)  Outcome: Ongoing, Progressing  Goal: Absence of Hospital-Acquired Illness or Injury  Outcome: Ongoing, Progressing  Intervention: Identify and Manage Fall Risk  Recent Flowsheet Documentation  Taken 7/22/2021 0000 by Tyesha Cabrales RN  Safety Promotion/Fall Prevention:   activity supervised   assistive device/personal items within reach   clutter free environment maintained   fall prevention program maintained   nonskid shoes/slippers when out of bed   safety round/check completed  Taken 7/21/2021 2020 by Tyesha Cabrales RN  Safety Promotion/Fall Prevention:   activity supervised   assistive device/personal items within reach   clutter free environment maintained   fall prevention program maintained   nonskid shoes/slippers when out of bed   safety round/check completed  Intervention: Prevent Skin Injury  Recent Flowsheet Documentation  Taken 7/22/2021 0000 by Tyesha Cabrales RN  Body Position: position changed independently  Skin Protection:   adhesive use limited   incontinence pads utilized  Taken 7/21/2021 2020 by Tyesha Cabrales RN  Body Position: position changed independently  Skin Protection:   adhesive use limited   incontinence pads utilized  Intervention: Prevent Infection  Recent Flowsheet Documentation  Taken 7/22/2021 0000 by Tyesha Cabrales RN  Infection Prevention: rest/sleep promoted  Goal: Optimal Comfort and Wellbeing  Outcome: Ongoing, Progressing  Intervention: Provide Person-Centered Care  Recent Flowsheet Documentation  Taken 7/22/2021 0000 by Tyesha Cabrales RN  Trust Relationship/Rapport:   choices provided   care explained  Taken 7/21/2021 2020 by Tyesha Cabrales RN  Trust Relationship/Rapport:   care explained   choices provided  Goal: Readiness for Transition  of Care  Outcome: Ongoing, Progressing   Goal Outcome Evaluation:  Plan of Care Reviewed With: patient        Progress: improving

## 2021-07-22 NOTE — PLAN OF CARE
VSS. RA. Sinus arrhythmia on monitor with PVCs and PACs. Pain controlled with PRN pain medication. No nausea throughout shift. Swelling in feet and lower legs- PO lasix given. Will continue to monitor closely.     Problem: Adult Inpatient Plan of Care  Goal: Plan of Care Review  Outcome: Ongoing, Progressing  Flowsheets  Taken 7/22/2021 1724 by Tammy Almeida RN  Progress: improving  Taken 7/22/2021 0358 by Tyesha Cabrales RN  Plan of Care Reviewed With: patient  Goal: Patient-Specific Goal (Individualized)  Outcome: Ongoing, Progressing  Goal: Absence of Hospital-Acquired Illness or Injury  Outcome: Ongoing, Progressing  Intervention: Identify and Manage Fall Risk  Recent Flowsheet Documentation  Taken 7/22/2021 1600 by Tammy Almeida RN  Safety Promotion/Fall Prevention:   activity supervised   assistive device/personal items within reach   clutter free environment maintained   nonskid shoes/slippers when out of bed   room organization consistent   safety round/check completed  Taken 7/22/2021 1400 by Tammy Almeida RN  Safety Promotion/Fall Prevention:   activity supervised   assistive device/personal items within reach   clutter free environment maintained   nonskid shoes/slippers when out of bed   room organization consistent   safety round/check completed  Taken 7/22/2021 1200 by Tammy Almeida RN  Safety Promotion/Fall Prevention:   activity supervised   assistive device/personal items within reach   clutter free environment maintained   nonskid shoes/slippers when out of bed   room organization consistent   safety round/check completed  Taken 7/22/2021 1000 by Tammy Almeida RN  Safety Promotion/Fall Prevention:   activity supervised   assistive device/personal items within reach   clutter free environment maintained   nonskid shoes/slippers when out of bed   room organization consistent   safety round/check completed  Taken 7/22/2021 0810 by Tammy Almeida RN  Safety Promotion/Fall Prevention:    activity supervised   assistive device/personal items within reach   clutter free environment maintained   nonskid shoes/slippers when out of bed   room organization consistent   safety round/check completed  Intervention: Prevent Skin Injury  Recent Flowsheet Documentation  Taken 7/22/2021 1600 by aTmmy Almeida RN  Body Position: position changed independently  Skin Protection:   adhesive use limited   incontinence pads utilized   tubing/devices free from skin contact  Taken 7/22/2021 1400 by Tammy Almeida RN  Body Position: position changed independently  Skin Protection:   adhesive use limited   incontinence pads utilized   tubing/devices free from skin contact  Taken 7/22/2021 1200 by Tammy Almeida RN  Body Position: position changed independently  Skin Protection:   adhesive use limited   incontinence pads utilized   tubing/devices free from skin contact  Taken 7/22/2021 1000 by Tammy Almeida RN  Body Position: position changed independently  Skin Protection:   adhesive use limited   incontinence pads utilized   tubing/devices free from skin contact  Taken 7/22/2021 0810 by Tammy Almeida RN  Body Position: position changed independently  Skin Protection:   adhesive use limited   incontinence pads utilized   tubing/devices free from skin contact  Intervention: Prevent and Manage VTE (venous thromboembolism) Risk  Recent Flowsheet Documentation  Taken 7/22/2021 0810 by Tammy Almeida RN  VTE Prevention/Management:   bilateral   dorsiflexion/plantar flexion performed   bleeding risk factor(s) identified  Intervention: Prevent Infection  Recent Flowsheet Documentation  Taken 7/22/2021 1600 by Tammy Almeida RN  Infection Prevention:   single patient room provided   rest/sleep promoted   personal protective equipment utilized   hand hygiene promoted   environmental surveillance performed  Taken 7/22/2021 1400 by Tammy Almeida RN  Infection Prevention:   single patient room provided   rest/sleep  promoted   personal protective equipment utilized   hand hygiene promoted   environmental surveillance performed  Taken 7/22/2021 1200 by Tammy Almeida RN  Infection Prevention:   single patient room provided   rest/sleep promoted   personal protective equipment utilized   hand hygiene promoted   environmental surveillance performed  Taken 7/22/2021 1000 by Tammy Almeida RN  Infection Prevention:   single patient room provided   rest/sleep promoted   personal protective equipment utilized   hand hygiene promoted   environmental surveillance performed  Taken 7/22/2021 0810 by Tammy Almeida RN  Infection Prevention:   single patient room provided   rest/sleep promoted   personal protective equipment utilized   hand hygiene promoted   environmental surveillance performed  Goal: Optimal Comfort and Wellbeing  Outcome: Ongoing, Progressing  Intervention: Provide Person-Centered Care  Recent Flowsheet Documentation  Taken 7/22/2021 0810 by Tammy Almeida RN  Trust Relationship/Rapport:   care explained   choices provided   questions encouraged   questions answered   thoughts/feelings acknowledged  Goal: Readiness for Transition of Care  Outcome: Ongoing, Progressing     Problem: Skin Injury Risk Increased  Goal: Skin Health and Integrity  Outcome: Ongoing, Progressing  Intervention: Optimize Skin Protection  Recent Flowsheet Documentation  Taken 7/22/2021 1600 by Tammy Almeida RN  Pressure Reduction Techniques:   frequent weight shift encouraged   weight shift assistance provided  Head of Bed (HOB): HOB at 60-90 degrees  Pressure Reduction Devices: pressure-redistributing mattress utilized  Skin Protection:   adhesive use limited   incontinence pads utilized   tubing/devices free from skin contact  Taken 7/22/2021 1400 by Tammy Almeida RN  Pressure Reduction Techniques:   weight shift assistance provided   frequent weight shift encouraged  Pressure Reduction Devices: pressure-redistributing mattress  utilized  Skin Protection:   adhesive use limited   incontinence pads utilized   tubing/devices free from skin contact  Taken 7/22/2021 1200 by Tammy Almeida RN  Pressure Reduction Techniques:   weight shift assistance provided   frequent weight shift encouraged  Head of Bed (HOB): HOB at 60-90 degrees  Pressure Reduction Devices: pressure-redistributing mattress utilized  Skin Protection:   adhesive use limited   incontinence pads utilized   tubing/devices free from skin contact  Taken 7/22/2021 1000 by Tammy Almeida RN  Pressure Reduction Techniques:   frequent weight shift encouraged   weight shift assistance provided  Head of Bed (HOB): HOB at 60-90 degrees  Pressure Reduction Devices: pressure-redistributing mattress utilized  Skin Protection:   adhesive use limited   incontinence pads utilized   tubing/devices free from skin contact  Taken 7/22/2021 0810 by Tammy Almeida RN  Pressure Reduction Techniques:   frequent weight shift encouraged   weight shift assistance provided  Head of Bed (HOB): HOB at 60-90 degrees  Pressure Reduction Devices: pressure-redistributing mattress utilized  Skin Protection:   adhesive use limited   incontinence pads utilized   tubing/devices free from skin contact     Problem: Pain Acute  Goal: Optimal Pain Control  Outcome: Ongoing, Progressing  Intervention: Develop Pain Management Plan  Recent Flowsheet Documentation  Taken 7/22/2021 1600 by Tammy Almeida RN  Pain Management Interventions: position adjusted  Taken 7/22/2021 1000 by Tammy Almeida RN  Pain Management Interventions: position adjusted  Taken 7/22/2021 0810 by Tammy Almeida RN  Pain Management Interventions: see MAR  Intervention: Prevent or Manage Pain  Recent Flowsheet Documentation  Taken 7/22/2021 0810 by Tammy Almeida RN  Sensory Stimulation Regulation: care clustered  Sleep/Rest Enhancement:   awakenings minimized   consistent schedule promoted   regular sleep/rest pattern promoted  Intervention:  Optimize Psychosocial Wellbeing  Recent Flowsheet Documentation  Taken 7/22/2021 0810 by Tammy Almeida, RN  Supportive Measures:   active listening utilized   self-care encouraged  Diversional Activities: television     Problem: Oral Intake Inadequate  Goal: Improved Oral Intake  Outcome: Ongoing, Progressing   Goal Outcome Evaluation:           Progress: improving

## 2021-07-23 ENCOUNTER — APPOINTMENT (OUTPATIENT)
Dept: CT IMAGING | Facility: HOSPITAL | Age: 71
End: 2021-07-23

## 2021-07-23 LAB
ALBUMIN SERPL-MCNC: 2.6 G/DL (ref 3.5–5.2)
ALBUMIN/GLOB SERPL: 1.1 G/DL
ALP SERPL-CCNC: 142 U/L (ref 39–117)
ALT SERPL W P-5'-P-CCNC: 24 U/L (ref 1–41)
ANION GAP SERPL CALCULATED.3IONS-SCNC: 11 MMOL/L (ref 5–15)
AST SERPL-CCNC: 37 U/L (ref 1–40)
BASOPHILS # BLD AUTO: 0.02 10*3/MM3 (ref 0–0.2)
BASOPHILS NFR BLD AUTO: 0.3 % (ref 0–1.5)
BILIRUB SERPL-MCNC: 0.4 MG/DL (ref 0–1.2)
BUN SERPL-MCNC: 22 MG/DL (ref 8–23)
BUN/CREAT SERPL: 31.4 (ref 7–25)
CALCIUM SPEC-SCNC: 8.1 MG/DL (ref 8.6–10.5)
CHLORIDE SERPL-SCNC: 106 MMOL/L (ref 98–107)
CO2 SERPL-SCNC: 21 MMOL/L (ref 22–29)
CREAT SERPL-MCNC: 0.7 MG/DL (ref 0.76–1.27)
DEPRECATED RDW RBC AUTO: 48.2 FL (ref 37–54)
EOSINOPHIL # BLD AUTO: 0.02 10*3/MM3 (ref 0–0.4)
EOSINOPHIL NFR BLD AUTO: 0.3 % (ref 0.3–6.2)
ERYTHROCYTE [DISTWIDTH] IN BLOOD BY AUTOMATED COUNT: 13.5 % (ref 12.3–15.4)
GFR SERPL CREATININE-BSD FRML MDRD: 111 ML/MIN/1.73
GLOBULIN UR ELPH-MCNC: 2.4 GM/DL
GLUCOSE SERPL-MCNC: 79 MG/DL (ref 65–99)
HCT VFR BLD AUTO: 34.6 % (ref 37.5–51)
HGB BLD-MCNC: 11.7 G/DL (ref 13–17.7)
IMM GRANULOCYTES # BLD AUTO: 0.02 10*3/MM3 (ref 0–0.05)
IMM GRANULOCYTES NFR BLD AUTO: 0.3 % (ref 0–0.5)
LAB AP CASE REPORT: NORMAL
LYMPHOCYTES # BLD AUTO: 1.05 10*3/MM3 (ref 0.7–3.1)
LYMPHOCYTES NFR BLD AUTO: 17.2 % (ref 19.6–45.3)
MCH RBC QN AUTO: 33.1 PG (ref 26.6–33)
MCHC RBC AUTO-ENTMCNC: 33.8 G/DL (ref 31.5–35.7)
MCV RBC AUTO: 98 FL (ref 79–97)
MONOCYTES # BLD AUTO: 0.43 10*3/MM3 (ref 0.1–0.9)
MONOCYTES NFR BLD AUTO: 7 % (ref 5–12)
NEUTROPHILS NFR BLD AUTO: 4.58 10*3/MM3 (ref 1.7–7)
NEUTROPHILS NFR BLD AUTO: 74.9 % (ref 42.7–76)
NRBC BLD AUTO-RTO: 0 /100 WBC (ref 0–0.2)
PATH REPORT.FINAL DX SPEC: NORMAL
PLATELET # BLD AUTO: 199 10*3/MM3 (ref 140–450)
PMV BLD AUTO: 10.3 FL (ref 6–12)
POTASSIUM SERPL-SCNC: 3.6 MMOL/L (ref 3.5–5.2)
PROT SERPL-MCNC: 5 G/DL (ref 6–8.5)
RBC # BLD AUTO: 3.53 10*6/MM3 (ref 4.14–5.8)
SODIUM SERPL-SCNC: 138 MMOL/L (ref 136–145)
WBC # BLD AUTO: 6.12 10*3/MM3 (ref 3.4–10.8)

## 2021-07-23 PROCEDURE — 84132 ASSAY OF SERUM POTASSIUM: CPT | Performed by: INTERNAL MEDICINE

## 2021-07-23 PROCEDURE — 49083 ABD PARACENTESIS W/IMAGING: CPT | Performed by: RADIOLOGY

## 2021-07-23 PROCEDURE — C1729 CATH, DRAINAGE: HCPCS

## 2021-07-23 PROCEDURE — 75989 ABSCESS DRAINAGE UNDER X-RAY: CPT

## 2021-07-23 PROCEDURE — 0W9G3ZZ DRAINAGE OF PERITONEAL CAVITY, PERCUTANEOUS APPROACH: ICD-10-PCS | Performed by: RADIOLOGY

## 2021-07-23 PROCEDURE — 99233 SBSQ HOSP IP/OBS HIGH 50: CPT | Performed by: INTERNAL MEDICINE

## 2021-07-23 PROCEDURE — 80053 COMPREHEN METABOLIC PANEL: CPT | Performed by: INTERNAL MEDICINE

## 2021-07-23 PROCEDURE — 85025 COMPLETE CBC W/AUTO DIFF WBC: CPT | Performed by: INTERNAL MEDICINE

## 2021-07-23 RX ORDER — OXYCODONE HYDROCHLORIDE 20 MG/1
20 TABLET ORAL EVERY 4 HOURS PRN
Qty: 45 TABLET | Refills: 0 | Status: SHIPPED | OUTPATIENT
Start: 2021-07-23 | End: 2021-08-01

## 2021-07-23 RX ORDER — POTASSIUM CHLORIDE 750 MG/1
40 CAPSULE, EXTENDED RELEASE ORAL AS NEEDED
Status: DISCONTINUED | OUTPATIENT
Start: 2021-07-23 | End: 2021-07-24 | Stop reason: HOSPADM

## 2021-07-23 RX ORDER — FUROSEMIDE 40 MG/1
40 TABLET ORAL
Status: DISCONTINUED | OUTPATIENT
Start: 2021-07-23 | End: 2021-07-24 | Stop reason: HOSPADM

## 2021-07-23 RX ORDER — POTASSIUM CHLORIDE 1.5 G/1.77G
40 POWDER, FOR SOLUTION ORAL AS NEEDED
Status: DISCONTINUED | OUTPATIENT
Start: 2021-07-23 | End: 2021-07-24 | Stop reason: HOSPADM

## 2021-07-23 RX ORDER — ACETAMINOPHEN 325 MG/1
650 TABLET ORAL EVERY 8 HOURS SCHEDULED
Status: DISCONTINUED | OUTPATIENT
Start: 2021-07-23 | End: 2021-07-24 | Stop reason: HOSPADM

## 2021-07-23 RX ORDER — POTASSIUM CHLORIDE 7.45 MG/ML
10 INJECTION INTRAVENOUS
Status: DISCONTINUED | OUTPATIENT
Start: 2021-07-23 | End: 2021-07-24 | Stop reason: HOSPADM

## 2021-07-23 RX ADMIN — SODIUM CHLORIDE, PRESERVATIVE FREE 10 ML: 5 INJECTION INTRAVENOUS at 08:00

## 2021-07-23 RX ADMIN — POTASSIUM CHLORIDE 40 MEQ: 750 CAPSULE, EXTENDED RELEASE ORAL at 18:21

## 2021-07-23 RX ADMIN — ACETAMINOPHEN 650 MG: 325 TABLET ORAL at 15:42

## 2021-07-23 RX ADMIN — MIRTAZAPINE 15 MG: 15 TABLET, FILM COATED ORAL at 20:33

## 2021-07-23 RX ADMIN — OXYCODONE HYDROCHLORIDE AND ACETAMINOPHEN 2 TABLET: 10; 325 TABLET ORAL at 07:59

## 2021-07-23 RX ADMIN — ASPIRIN 81 MG CHEWABLE TABLET 81 MG: 81 TABLET CHEWABLE at 08:00

## 2021-07-23 RX ADMIN — PANCRELIPASE 24000 UNITS OF LIPASE: 24000; 76000; 120000 CAPSULE, DELAYED RELEASE PELLETS ORAL at 11:28

## 2021-07-23 RX ADMIN — OXYCODONE HYDROCHLORIDE AND ACETAMINOPHEN 2 TABLET: 10; 325 TABLET ORAL at 00:26

## 2021-07-23 RX ADMIN — POTASSIUM CHLORIDE 40 MEQ: 750 CAPSULE, EXTENDED RELEASE ORAL at 11:28

## 2021-07-23 RX ADMIN — FUROSEMIDE 40 MG: 40 TABLET ORAL at 18:18

## 2021-07-23 RX ADMIN — PANCRELIPASE 24000 UNITS OF LIPASE: 24000; 76000; 120000 CAPSULE, DELAYED RELEASE PELLETS ORAL at 17:36

## 2021-07-23 RX ADMIN — OXYCODONE HYDROCHLORIDE 20 MG: 15 TABLET ORAL at 15:39

## 2021-07-23 RX ADMIN — PANCRELIPASE 24000 UNITS OF LIPASE: 24000; 76000; 120000 CAPSULE, DELAYED RELEASE PELLETS ORAL at 08:00

## 2021-07-23 RX ADMIN — OXYCODONE HYDROCHLORIDE 20 MG: 15 TABLET ORAL at 20:33

## 2021-07-23 RX ADMIN — FUROSEMIDE 40 MG: 40 TABLET ORAL at 08:00

## 2021-07-23 NOTE — NURSING NOTE
CT guided paracentesis performed by Dr Torres.  1450ml cloudy yellow fluid removed (no labs ordered).  Pt tolerated well.

## 2021-07-23 NOTE — CASE MANAGEMENT/SOCIAL WORK
Continued Stay Note  [facility]     Patient Name: Abhijit Aldrich  MRN: [id number]  Today's Date: 7/23/2021    Admit Date: 7/16/2021    Discharge Plan     Row Name 07/23/21 1604       Plan    Plan  discharge plan    Plan Comments  CM spoke with pt in room regarding discharge plan. Plan is home with family to assist. Pt states he has plenty of help at home and denies discharge needs. CM will cont to follow.    Final Discharge Disposition Code  01 - home or self-care        Discharge Codes    No documentation.       Expected Discharge Date and Time     Expected Discharge Date Expected Discharge Time    Jul 24, 2021             Elyssa Larry RN

## 2021-07-23 NOTE — CONSULTS
Nellie Oacsio MD  Consulting physician: Alberto    Chief Complaint   Patient presents with   • Abdominal Pain       Reason for consult: Pain    HPI: Patient is a 71-year-old male brought in hospital due to abdominal pain.  Patient states that his pain is somewhat sharp in nature and feels like someone is punching him in the gut.  Pain seems to get worse as he develops worsening a societies, which also caused him to have some dyspnea.  States that when he had a paracentesis a lot of his symptoms showed improvement.  Patient states that at worst his pain is a 10 out of 10, however states that he is very happy with the current pain regimen stating that it does a good job at making the pain tolerable but not making him too drowsy.    Pain assesment: See above    Dyspnea: See above    N/V: Denies    PPS: 50      Past Medical History:   Diagnosis Date   • CAD (coronary artery disease)    • Cirrhosis (CMS/HCC)    • Hypertension    • PVD (peripheral vascular disease) (CMS/HCC)      Past Surgical History:   Procedure Laterality Date   • ABDOMINAL AORTIC ANEURYSM REPAIR     • AORTA BIFEMORAL BYPASS     • ERCP N/A 7/20/2021    Procedure: ENDOSCOPIC RETROGRADE CHOLANGIOPANCREATOGRAPHY WITH STENT PLACEMENT;  Surgeon: Fer Menezes MD;  Location:  [facility];  Service: Gastroenterology;  Laterality: N/A;   • ERCP N/A 7/21/2021    Procedure: ENDOSCOPIC RETROGRADE CHOLANGIOPANCREATOGRAPHY WITH STENT PLACEMENT;  Surgeon: Jose Erickson MD;  Location:  [facility];  Service: Gastroenterology;  Laterality: N/A;     Current Code Status     Date Active Code Status Order ID Comments User Context       7/16/2021 2349 CPR [id number]  Samina Scott DO Inpatient     Advance Care Planning Activity      Questions for Current Code Status     Question Answer Comment    Code Status CPR     Medical Interventions (Level of Support Prior to Arrest) Full     Level Of Support Discussed With Patient         Current  Facility-Administered Medications   Medication Dose Route Frequency Provider Last Rate Last Admin   • aspirin chewable tablet 81 mg  81 mg Oral Daily Fer Menezes MD   81 mg at 07/23/21 0800   • furosemide (LASIX) tablet 40 mg  40 mg Oral BID Maine Dominguez MD       • heparin (porcine) 5000 UNIT/ML injection 5,000 Units  5,000 Units Subcutaneous Q8H Fer Menezes MD   5,000 Units at 07/19/21 0503   • HYDROmorphone (DILAUDID) injection 1 mg  1 mg Intravenous Q8H PRN Maine Dominguez MD   1 mg at 07/22/21 1811   • melatonin tablet 5 mg  5 mg Oral Nightly PRN Fer Menezes MD   5 mg at 07/22/21 2022   • mirtazapine (REMERON) tablet 15 mg  15 mg Oral Nightly Fer Menezes MD   15 mg at 07/22/21 2023   • naloxone (NARCAN) injection 0.4 mg  0.4 mg Intravenous Q5 Min PRN Fer Menezes MD       • nicotine (NICODERM CQ) 21 MG/24HR patch 1 patch  1 patch Transdermal Q24H Fer Menezes MD       • nicotine polacrilex (COMMIT) lozenge 2 mg  2 mg Mouth/Throat Q2H PRN Fer Menezes MD       • ondansetron (ZOFRAN) injection 4 mg  4 mg Intravenous Q6H PRN Fer Menezes MD   4 mg at 07/20/21 2025   • oxyCODONE-acetaminophen (PERCOCET)  MG per tablet 2 tablet  2 tablet Oral Q4H PRN Maine Dominguez MD   2 tablet at 07/23/21 0759   • pancrelipase (Lip-Prot-Amyl) (CREON) capsule 24,000 units of lipase  24,000 units of lipase Oral TID With Meals Fer Menezes MD   24,000 units of lipase at 07/23/21 1128   • potassium chloride (MICRO-K) CR capsule 40 mEq  40 mEq Oral PRN Maine Dominguez MD   40 mEq at 07/23/21 1128    Or   • potassium chloride (KLOR-CON) packet 40 mEq  40 mEq Oral PRN Maine Dominguez MD        Or   • potassium chloride 10 mEq in 100 mL IVPB  10 mEq Intravenous Q1H PRN Maine Dominguez MD       • Sodium Chloride (PF) 0.9 % 10 mL  10 mL Intravenous PRN Fer Menezes MD       • sodium chloride 0.9 % flush 10 mL  10 mL Intravenous Q12H Fer Menezes MD   10 mL at 07/23/21 0800   •  "sodium chloride 0.9 % flush 10 mL  10 mL Intravenous PRN Fer Menezes MD            HYDROmorphone  •  melatonin  •  [DISCONTINUED] Morphine **AND** naloxone  •  nicotine polacrilex  •  ondansetron  •  oxyCODONE-acetaminophen  •  potassium chloride **OR** potassium chloride **OR** potassium chloride  •  Sodium Chloride (PF)  •  sodium chloride  No Known Allergies  Family History   Problem Relation Age of Onset   • No Known Problems Mother      Social History     Socioeconomic History   • Marital status:      Spouse name: Not on file   • Number of children: Not on file   • Years of education: Not on file   • Highest education level: Not on file   Tobacco Use   • Smoking status: Current Every Day Smoker     Packs/day: 1.00   • Smokeless tobacco: Never Used   Substance and Sexual Activity   • Alcohol use: Not Currently   • Drug use: Yes     Types: Marijuana   • Sexual activity: Not Currently     Review of Systems -all others reviewed and found negative that mentioned in the HPI      BP 98/78 (BP Location: Left arm, Patient Position: Lying)   Pulse 94   Temp 98.9 °F (37.2 °C) (Oral)   Resp 16   Ht 180.3 cm (71\")   Wt 55.7 kg (122 lb 12.8 oz)   SpO2 96%   BMI 17.13 kg/m²   No intake or output data in the 24 hours ending 07/23/21 1354  Physical Exam:      General Appearance:    Alert, cooperative, in no acute distress   Head:    Normocephalic, without obvious abnormality, atraumatic   Eyes:            Lids and lashes normal, conjunctivae and sclerae normal, no   icterus, no pallor, corneas clear, PERRLA   Ears:    Ears appear intact with no abnormalities noted   Throat:   No oral lesions, no thrush, oral mucosa moist   Neck:   No adenopathy, supple, trachea midline, no thyromegaly, no   carotid bruit, no JVD   Back:     No kyphosis present, no scoliosis present, no skin lesions,      erythema or scars, no tenderness to percussion or                   palpation,   range of motion normal   Lungs:     Clear " to auscultation,respirations regular, even and                  unlabored    Heart:    Regular rhythm and normal rate, normal S1 and S2, no            murmur, no gallop, no rub, no click   Chest Wall:    No abnormalities observed   Abdomen:     + ascites   Rectal:     Deferred   Extremities:   LE edema   Pulses:   Pulses palpable and equal bilaterally   Skin:   No bleeding, bruising or rash   Lymph nodes:   No palpable adenopathy   Neurologic:   Cranial nerves 2 - 12 grossly intact, sensation intact, DTR       present and equal bilaterally       Results from last 7 days   Lab Units 07/23/21  0342   WBC 10*3/mm3 6.12   HEMOGLOBIN g/dL 11.7*   HEMATOCRIT % 34.6*   PLATELETS 10*3/mm3 199     Results from last 7 days   Lab Units 07/23/21  0342   SODIUM mmol/L 138   POTASSIUM mmol/L 3.6   CHLORIDE mmol/L 106   CO2 mmol/L 21.0*   BUN mg/dL 22   CREATININE mg/dL 0.70*   CALCIUM mg/dL 8.1*   BILIRUBIN mg/dL 0.4   ALK PHOS U/L 142*   ALT (SGPT) U/L 24   AST (SGOT) U/L 37   GLUCOSE mg/dL 79     Results from last 7 days   Lab Units 07/23/21  0342   SODIUM mmol/L 138   POTASSIUM mmol/L 3.6   CHLORIDE mmol/L 106   CO2 mmol/L 21.0*   BUN mg/dL 22   CREATININE mg/dL 0.70*   GLUCOSE mg/dL 79   CALCIUM mg/dL 8.1*     Imaging Results (Last 72 Hours)     Procedure Component Value Units Date/Time    FL ERCP pancreatic and biliary ducts [[id number]] Collected: 07/21/21 1754     Updated: 07/21/21 1757    Narrative:      EXAMINATION: FL ERCP PANCREATIC AND BILIARY DUCTS-      INDICATION: ercp; K85.00-Idiopathic acute pancreatitis without necrosis  or infection; K74.69-Other cirrhosis of liver; E43-Unspecified severe  protein-calorie malnutrition; K86.89-Other specified diseases of  pancreas      COMPARISON: NONE     FINDINGS: 1 minute 10 seconds of fluoroscopy time provided with 3 images  stored during ERCP       Impression:      1 minute 10 seconds of fluoroscopy time provided with 3  images stored during ERCP     This report was  finalized on 7/21/2021 5:54 PM by Apollo Winters.       CT Guided Paracentesis [[id number]] Resulted: 07/21/21 1619     Updated: 07/21/21 1619    Narrative:      Clinical Indication:  72 year-old male with history of chronic   pancreatitis and large amount of ascites.     Procedure:  CT guided paracentesis     :  Dr. Felix     Complication:  None apparent.     Technique:  The right mid-abdominal region was prepped and draped in the   usual/sterile fashion.  Local anesthesia with Lidocaine was performed.    Utilizing CT localization, a 8.5 [place] drainage catheter placed into   ascitic fluid centered in the right paracolic gutter.  A sample fluid was   sent for laboratory analysis, per the referring service.  Approximately   2500 cc's of ascitic fluid was drained off.  The drainage catheter was   removed and a sterile dressing was applied to the access site.  The   patient tolerated the procedure well without complication.     Impression:  Technically successful CT guided paracentesis.    FL ERCP pancreatic and biliary ducts [[id number]] Collected: 07/21/21 0827     Updated: 07/21/21 0831    Narrative:      EXAMINATION: FL ERCP PANCREATIC AND BILIARY DUCTS-      INDICATION: ercp; K85.00-Idiopathic acute pancreatitis without necrosis  or infection; K74.69-Other cirrhosis of liver; E43-Unspecified severe  protein-calorie malnutrition      COMPARISON: NONE     FINDINGS: 1 minute 32 seconds of fluoroscopy time provided with one  image saved during ERCP       Impression:      1 minute 32 seconds of fluoroscopy time provided with one  image saved during ERCP     This report was finalized on 7/21/2021 8:28 AM by Apollo Winters.           Impression: Pancreatic mass  Neoplastic pain  Ascites  Edema  GO    Plan: Please note that I did not discuss goals of care and did not discuss CODE STATUS, mostly due to the fact that the patient did get tearful during my discussion.  In respect to symptoms we will continue the  patient on the current symptom regimen as he states that is helping him in controlling his pain.  We will refer the patient to the palliative clinic.        Jeffery Pena DO  07/23/21  13:54 EDT

## 2021-07-23 NOTE — POST-PROCEDURE NOTE
Interventional Radiology Operative Note    Date: 07/23/21     Time: 17:47 EDT     Pre-op Diagnosis: Moderate ascites  Post-op Diagnosis: same     Procedure: CT paracentesis    Surgeon: Oliver Torres MD   Assistants: None    Sedation: None.    Estimated Blood Loss (EBL): Trace     Urine Output (UOP): N/A (short procedure)    IVF: N/A (short procedure)    Findings: Large ascites    Specimens: 1450 mL cloudy yellow ascitic fluid    Complications: None.     Disposition: Patient to floor.

## 2021-07-23 NOTE — PLAN OF CARE
Problem: Adult Inpatient Plan of Care  Goal: Plan of Care Review  Outcome: Ongoing, Progressing  Flowsheets (Taken 7/23/2021 0540)  Progress: improving  Plan of Care Reviewed With: patient  Goal: Patient-Specific Goal (Individualized)  Outcome: Ongoing, Progressing  Goal: Absence of Hospital-Acquired Illness or Injury  Outcome: Ongoing, Progressing  Intervention: Identify and Manage Fall Risk  Recent Flowsheet Documentation  Taken 7/23/2021 0400 by Maine Witt, RN  Safety Promotion/Fall Prevention:   activity supervised   assistive device/personal items within reach   clutter free environment maintained   elopement precautions   nonskid shoes/slippers when out of bed   room organization consistent   toileting scheduled   safety round/check completed  Taken 7/23/2021 0000 by Maine Witt, RN  Safety Promotion/Fall Prevention:   activity supervised   assistive device/personal items within reach   clutter free environment maintained   fall prevention program maintained   nonskid shoes/slippers when out of bed   room organization consistent   safety round/check completed   toileting scheduled  Taken 7/22/2021 2025 by Maine Witt, RN  Safety Promotion/Fall Prevention:   activity supervised   assistive device/personal items within reach   clutter free environment maintained   fall prevention program maintained   nonskid shoes/slippers when out of bed   room organization consistent   safety round/check completed   toileting scheduled  Intervention: Prevent Skin Injury  Recent Flowsheet Documentation  Taken 7/23/2021 0400 by Maine Witt, RN  Body Position: position changed independently  Skin Protection:   adhesive use limited   incontinence pads utilized   tubing/devices free from skin contact  Taken 7/23/2021 0000 by Maine Witt, RN  Body Position: position changed independently  Skin Protection:   adhesive use limited   incontinence pads utilized   tubing/devices free from skin  contact  Taken 7/22/2021 2025 by Maine Witt RN  Body Position: position changed independently  Skin Protection:   adhesive use limited   tubing/devices free from skin contact  Intervention: Prevent and Manage VTE (venous thromboembolism) Risk  Recent Flowsheet Documentation  Taken 7/22/2021 2025 by Maine Witt RN  VTE Prevention/Management:   bilateral   dorsiflexion/plantar flexion performed   bleeding risk factor(s) identified  Intervention: Prevent Infection  Recent Flowsheet Documentation  Taken 7/23/2021 0400 by Maine Witt RN  Infection Prevention:   environmental surveillance performed   hand hygiene promoted   rest/sleep promoted   single patient room provided   visitors restricted/screened  Taken 7/23/2021 0000 by Manie Witt RN  Infection Prevention:   environmental surveillance performed   hand hygiene promoted   rest/sleep promoted   single patient room provided   visitors restricted/screened  Taken 7/22/2021 2025 by Maine Witt RN  Infection Prevention:   environmental surveillance performed   hand hygiene promoted   rest/sleep promoted   single patient room provided   visitors restricted/screened  Goal: Optimal Comfort and Wellbeing  Outcome: Ongoing, Progressing  Intervention: Provide Person-Centered Care  Recent Flowsheet Documentation  Taken 7/22/2021 2025 by Maine Witt RN  Trust Relationship/Rapport:   care explained   choices provided   emotional support provided   empathic listening provided   questions answered   questions encouraged   reassurance provided   thoughts/feelings acknowledged  Goal: Readiness for Transition of Care  Outcome: Ongoing, Progressing     Problem: Skin Injury Risk Increased  Goal: Skin Health and Integrity  Outcome: Ongoing, Progressing  Intervention: Optimize Skin Protection  Recent Flowsheet Documentation  Taken 7/23/2021 0400 by Maine Witt RN  Pressure Reduction Techniques:   frequent weight shift encouraged   weight  shift assistance provided  Pressure Reduction Devices: pressure-redistributing mattress utilized  Skin Protection:   adhesive use limited   incontinence pads utilized   tubing/devices free from skin contact  Taken 7/23/2021 0000 by Maine Witt RN  Pressure Reduction Techniques:   frequent weight shift encouraged   weight shift assistance provided  Pressure Reduction Devices: pressure-redistributing mattress utilized  Skin Protection:   adhesive use limited   incontinence pads utilized   tubing/devices free from skin contact  Taken 7/22/2021 2025 by Maine Witt, RN  Pressure Reduction Techniques:   frequent weight shift encouraged   weight shift assistance provided  Pressure Reduction Devices: pressure-redistributing mattress utilized  Skin Protection:   adhesive use limited   tubing/devices free from skin contact     Problem: Pain Acute  Goal: Optimal Pain Control  Outcome: Ongoing, Progressing  Intervention: Develop Pain Management Plan  Recent Flowsheet Documentation  Taken 7/22/2021 2025 by Maine Witt RN  Pain Management Interventions:   see MAR   quiet environment facilitated   position adjusted  Intervention: Prevent or Manage Pain  Recent Flowsheet Documentation  Taken 7/22/2021 2025 by Maine Witt RN  Sensory Stimulation Regulation: care clustered  Intervention: Optimize Psychosocial Wellbeing  Recent Flowsheet Documentation  Taken 7/22/2021 2025 by Maine Witt, RN  Diversional Activities: television     Problem: Oral Intake Inadequate  Goal: Improved Oral Intake  Outcome: Ongoing, Progressing   Goal Outcome Evaluation:  Plan of Care Reviewed With: patient        Progress: improving

## 2021-07-23 NOTE — PLAN OF CARE
Goal Outcome Evaluation:  Plan of Care Reviewed With: patient        Progress: no change  Outcome Summary: new palliative consult. Pt with new diagnosis of pancreatic cancer. Pt plans to see surgery at  for a consult to see if he would be a candidate for whipple. Pt having a paracentesis today. Abd distended and edema in back, LE pitting edema. Plan for follow up in palliative clinic to manage symptoms. pt having regular bms. Pt gets full easily although he says he has a good appetite. Pt has some dyspnea on exertion due to ascites.  Pt does not require 02.  Pt has 4 kids and desires his dtr Cristina Nolasco who is a nurse to be his hcs. Acp consult placed to complete Living will.   PProblem: Palliative Care  Goal: Enhanced Quality of Life  Intervention: Optimize Psychosocial Wellbeing  Flowsheets (Taken 7/23/2021 1115)  Supportive Measures:   active listening utilized   verbalization of feelings encouraged   problem-solving facilitated   goal setting facilitated  Family/Support System Care:   presence promoted   self-care encouraged   support providedTeam Meeting [address] Jeffery Pena DO,  Marce Barry RN CHJANETH, Leanna ETIENNE, Adilene OSEI, Epi Rodriguez RN, Sherly Macdonald RN, MANOLO

## 2021-07-23 NOTE — PROGRESS NOTES
[facility] Medicine Services  PROGRESS NOTE    Patient Name: Abhijit Aldrich  : 1950  MRN: [id number]    Date of Admission: 2021  Primary Care Physician: Nellie Ocasio MD    Subjective   Subjective     CC:  abd pain/weight loss    HPI:  Pain control an issue today and uncomfortable from ascites. Doesn't feel well enough to go home today.     ROS:  Gen- No fevers, chills  CV- No chest pain, palpitations  Resp- No cough, dyspnea  GI- No N/V/D, +abd pain, +edema         Objective   Objective     Vital Signs:   Temp:  [97.9 °F (36.6 °C)-98.5 °F (36.9 °C)] 97.9 °F (36.6 °C)  Heart Rate:  [] 90  Resp:  [16] 16  BP: (103-117)/(82-95) 103/82     Physical Exam:  GEN- pleasant, appears older than stated age and cachetic, sitting up in chair  HEENT- atraumatic, normocephalic, eomi, poor dentition, temporal wasting  NECK- supple, trachea midline, no masses  RESP: ctab, normal effort  GI: distension, +fluid wave  MSK: 3+ foot/ankle edema noted, spontaneous movement of all extremities  NEURO: alert, oriented, no focal deficits  SKIN: no rashes  PSYCH: appropriate mood and affect       Results Reviewed:  LAB RESULTS:      Lab 21  0342 21  0438 21  0514 21  0432 21  0546 21  0604 21  0604 21  2153 21  1721 21  1721   WBC 6.12 8.04 6.27 5.81 6.23   < > 5.56  --    < > 8.35   HEMOGLOBIN 11.7* 12.5* 12.3* 12.2* 13.2   < > 12.6*  --    < > 12.6*   HEMATOCRIT 34.6* 37.9 37.2* 36.3* 38.8   < > 36.9*  --    < > 36.9*   PLATELETS 199 229 224 226 257   < > 226  --    < > 249   NEUTROS ABS 4.58 6.06 4.91 4.17 4.51   < > 4.07  --    < > 6.09   IMMATURE GRANS (ABS) 0.02 0.04 0.03 0.01 0.02   < > 0.02  --    < > 0.02   LYMPHS ABS 1.05 1.49 0.91 1.26 1.22   < > 1.03  --    < > 1.75   MONOS ABS 0.43 0.42 0.42 0.35 0.44   < > 0.37  --    < > 0.45   EOS ABS 0.02 0.02 0.00 0.01 0.02   < > 0.05  --    < > 0.02   MCV 98.0* 99.2* 99.7*  97.3* 96.3   < > 97.6*  --    < > 96.9   LACTATE  --   --   --   --   --   --   --   --   --  1.5   PROTIME  --   --   --   --   --   --  14.2 14.1  --   --     < > = values in this interval not displayed.         Lab 07/23/21 0342 07/22/21 0438 07/21/21 0514 07/19/21  0432 07/18/21  0546 07/17/21  0604 07/17/21  0604   SODIUM 138 136 139 135* 133*   < > 133*   POTASSIUM 3.6 3.9 5.1 3.4* 3.6   < > 3.6   CHLORIDE 106 103 108* 101 97*   < > 96*   CO2 21.0* 22.0 24.0 25.0 26.0   < > 27.0   ANION GAP 11.0 11.0 7.0 9.0 10.0   < > 10.0   BUN 22 17 15 10 14   < > 20   CREATININE 0.70* 0.71* 0.71* 0.51* 0.56*   < > 0.65*   GLUCOSE 79 80 121* 95 104*   < > 98   CALCIUM 8.1* 8.1* 8.1* 8.1* 8.3*   < > 8.0*   PHOSPHORUS  --   --   --   --   --   --  3.0   TSH  --   --   --   --   --   --  1.400    < > = values in this interval not displayed.         Lab 07/23/21 0342 07/22/21 0438 07/21/21 0514 07/19/21 0432 07/18/21  0546 07/16/21  1721 07/16/21  1721   TOTAL PROTEIN 5.0* 5.3* 5.3* 5.2* 5.5*   < > 6.3   ALBUMIN 2.60* 2.90* 2.80* 2.90* 3.00*   < > 3.40*   GLOBULIN 2.4 2.4 2.5 2.3 2.5   < > 2.9   ALT (SGPT) 24 26 23 23 24   < > 28   AST (SGOT) 37 39 36 31 35   < > 36   BILIRUBIN 0.4 0.5 0.3 0.4 0.5   < > 0.4   ALK PHOS 142* 150* 147* 138* 147*   < > 157*   LIPASE  --   --  450*  --   --   --  706*    < > = values in this interval not displayed.         Lab 07/17/21  0604 07/16/21  2153   PROTIME 14.2 14.1   INR 1.13 1.12         Lab 07/16/21  1721   CHOLESTEROL 118  115   LDL CHOL 53  51   HDL CHOL 30*  30*   TRIGLYCERIDES 214*  212*         Lab 07/17/21  0604   IRON 75   IRON SATURATION 28   TIBC 271*   TRANSFERRIN 182*   FERRITIN 162.40   FOLATE >20.00   VITAMIN B 12 704         Brief Urine Lab Results  (Last result in the past 365 days)      Color   Clarity   Blood   Leuk Est   Nitrite   Protein   CREAT   Urine HCG        07/16/21 1946 Yellow Clear Negative Negative Negative Negative               Microbiology  Results Abnormal     Procedure Component Value - Date/Time    Body Fluid Culture - Body Fluid, Peritoneum [[id number]] Collected: 07/19/21 1520    Lab Status: Final result Specimen: Body Fluid from Peritoneum Updated: 07/22/21 0603     Body Fluid Culture No growth at 3 days     Gram Stain Moderate (3+) WBCs seen      No organisms seen    COVID-19 and FLU A/B PCR - Swab, Nasopharynx [[id number]]  (Normal) Collected: 07/16/21 1934    Lab Status: Final result Specimen: Swab from Nasopharynx Updated: 07/16/21 2012     COVID19 Not Detected     Influenza A PCR Not Detected     Influenza B PCR Not Detected    Narrative:      Fact sheet for providers: https://www.fda.gov/media/102455/download    Fact sheet for patients: https://www.fda.gov/media/423108/download    Test performed by PCR.          FL ERCP pancreatic and biliary ducts    Result Date: 7/21/2021  EXAMINATION: FL ERCP PANCREATIC AND BILIARY DUCTS-  INDICATION: ercp; K85.00-Idiopathic acute pancreatitis without necrosis or infection; K74.69-Other cirrhosis of liver; E43-Unspecified severe protein-calorie malnutrition; K86.89-Other specified diseases of pancreas  COMPARISON: NONE  FINDINGS: 1 minute 10 seconds of fluoroscopy time provided with 3 images stored during ERCP      Impression: 1 minute 10 seconds of fluoroscopy time provided with 3 images stored during ERCP  This report was finalized on 7/21/2021 5:54 PM by Apollo Winters.        Results for orders placed during the hospital encounter of 07/16/21    Adult Transthoracic Echo Complete w/ Color, Spectral and Contrast if necessary per protocol    Interpretation Summary  · The study is technically difficult for diagnosis with poor acoustic windows. The quality of the study is extremely limited due to limited views obtained, patient body habitus and patient positioning.  · Left ventricular ejection fraction appears to be 51 - 55%. Left ventricular systolic function is normal.  · There is calcification of the  aortic valve.  · Mild aortic valve regurgitation is present.  · Mild mitral valve regurgitation is present.      I have reviewed the medications:  Scheduled Meds:aspirin, 81 mg, Oral, Daily  furosemide, 40 mg, Oral, BID  heparin (porcine), 5,000 Units, Subcutaneous, Q8H  mirtazapine, 15 mg, Oral, Nightly  nicotine, 1 patch, Transdermal, Q24H  pancrelipase (Lip-Prot-Amyl), 24,000 units of lipase, Oral, TID With Meals  sodium chloride, 10 mL, Intravenous, Q12H      Continuous Infusions:   PRN Meds:.HYDROmorphone  •  melatonin  •  [DISCONTINUED] Morphine **AND** naloxone  •  nicotine polacrilex  •  ondansetron  •  oxyCODONE-acetaminophen  •  potassium chloride **OR** potassium chloride **OR** potassium chloride  •  Sodium Chloride (PF)  •  sodium chloride    Assessment/Plan   Assessment & Plan     Active Hospital Problems    Diagnosis  POA   • **Acute pancreatitis [K85.90]  Yes   • Pancreatic mass [K86.89]  Unknown   • Cirrhosis (CMS/HCC) [K74.60]  Unknown   • CAD (coronary artery disease) [I25.10]  Unknown   • Hypertension [I10]  Unknown   • PVD (peripheral vascular disease) (CMS/HCC) [I73.9]  Unknown   • Severe malnutrition (CMS/HCC) [E43]  Unknown   • Anemia [D64.9]  Unknown   • Weight loss [R63.4]  Unknown   • Numbness of right anterior thigh [R20.0]  Unknown      Resolved Hospital Problems   No resolved problems to display.        Brief Hospital Course to date:  Abhijit Aldrich is a 71-year-old male patient with a PMH significant for PVD status post aortic femoral bypass (per patient), history of aortic aneurysm status post repair, tobacco abuse, chronic pancreatitis who presents to the ED due to abdominal pain found to have acute pancreatitis.    This patient's problems and plans were partially entered by my partner and updated as appropriate by me 07/23/21.         Acute on chronic pancreatitis  New diagnosis of pancreatic malignancy  Elevated CA 19-9  -GI consulted, appreciate assistance, MRCP done with abnl  biliary dilatation and irregular contour with double duct sign of CBD/main pancreatic duct concerning for pancreatic head lesion  --noted  significantly elevated- concerning for malignancy   -[facility] GB done 7/17 with mod/large volume ascites and evidence of liver cirrhosis  -Morphine as needed for pain, have also added oral percocet per patient request -- significant decrease in IV narcotics today while increasing oral percocet as a bridge to hopeful home soon.  -Advance diet as tolerated, nutrition consult   -Reports alcohol use remotely but reports none in years  -Continue home Creon  --GI has ordered further lab studies including AFP, hep A/B serologies- pending  --s/p paracentesis 7/19 with 2.5 L removed, cytology/cx pending, not c/w SBP  ---s/p ERCP 7/20 and again 7/21 with biliary brushings and stent placement. Unable to cannulate CBD, PD stent placement done on 7/20. 7/21 ERCP noted distal CBD stricture c/w pancreatic neoplasm, brushings sent, biliary spincherotomy done with biliary stent placement.   ---oncology evaluated 7/22, recommending outpatient referral to [facility] onc for consideration of Whipple- then f/u with [facility] 3 weeks  ---worsening ascites today- para ordered in IR. Also have ordered palliative consult for assistance with pain control    Cirrhosis with ascites and abd varices on CT imaging  --repeat para today  --add back BID lasix- consider adding back aldactone tomorrow as BP can tolerate- significant swelling likely 2/2 D5 given while NPO. Monitor. No resp compromise at this time     Severe protein calorie malnutrition  Progressive weight loss  -Mildly decreased albumin  -BMI 14.37  -Banana bag x3 days  -Nutrition consult     Anemia  -monitor     Numbness to the anterior thigh  -PT/OT  -monitor     CAD  PVD  Hypertension  -Hold spironolactone, Lasix resumed  -Continue home aspirin    DVT prophylaxis:  Medical DVT prophylaxis orders are present.       Disposition: I expect the patient to  be discharged when pain control achieved and after para- maybe tomorrow  CODE STATUS:   Code Status and Medical Interventions:   Ordered at: 07/16/21 1676     Level Of Support Discussed With:    Patient     Code Status:    CPR     Medical Interventions (Level of Support Prior to Arrest):    Full       Maine Dominguez MD  07/23/21

## 2021-07-23 NOTE — PROGRESS NOTES
Clinical Nutrition   Reason For Visit: Follow-up protocol, Need for education    Patient Name: Abhijit Aldrich  YOB: 1950  MRN: [id number]  Date of Encounter: 07/23/21 14:32 EDT  Admission date: 7/16/2021      -Discontinued Boost Plus.    -Ordered Boost Breeze with each meal (patient's preference)    -Continue daily snacks (2x daily).    -RD provided patient with nutrition education about ways to increase calorie intake, protein intake, and weight. Reviewed daily calorie/protein needs to promote weight gain. Patient engaged, asked appropriate questions, verbalized understanding. Patient's UBW was ~160 lbs prior to losing weight. RD advised patient to make this his general weight goal for now and to weigh himself weekly to ensure he is eating enough to gain weight.      Nutrition Assessment     Admission Problem List:  Abdominal pain/distension  BLE edema  Acute on chronic pancreatitis  Cirrhosis with ascites  Anemia  Severe, Chronic Malnutrition (dx per RD 7/17)      Applicable PMH:  PVD s/p aortic femoral bypass  Aortic aneurysm s/p repair  Tobacco  CAD  HTN  Chronic pancreatitis      Applicable medical tests/procedures since admission:  (7/18) abdominal MRI - concern for pancreatic head malignancy  (7/19) paracentesis - 2.5 L  (7/20) ERCP - abnormal mucosa at pylorus, pancreatic duct stent placed  (7/21) repeat ERCP - consistent with pancreatic neoplasm      Reported/Observed/Food/Nutrition Related History   7/23) Patient sitting up in bedside chair during visit. Reports he has an appetite but gets full quickly when eating at mealtime 2/2 ascites. States he is scheduled to have second paracentesis this afternoon. Tries his best to eat a few bites of each food on the meal tray. Was drinking the wildberry Boost Breeze. Prefers the Boost Breeze more than the Boost Plus. Not tolerating the full liquid Boost at this time. Aware that the best meal schedule right now is small, frequent meals. Does want to  "receive snacks daily to make this possible (cottage cheese + fruit, sandwiches). Wants to try the other Boost Breeze flavors. Hopefully patient will be able to eat more at one time after his paracentesis this afternoon. Patient aware he needs to gain weight, states his doctors have encouraged this as well. Patient eager to learn about what to eat at home and how to gain weight after discharging from hospital. Open to education this visit - RD provided.    Per oncologist note (7/22): \"Pancreatic head mass likely a primary malignancy.  Does not appear to be metastatic.  I feel that he is a marginal candidate for surgery with a Whipple procedure.  Though I would like surgical oncology at  to evaluate the patient to let us know if surgery is a possibility on not.  If surgery is not possible then I would recommend chemotherapy though the possibility of CyberKnife would also be considered. \"      7/19) Abdominal MRI results pending along with GOC/POC. Boost Breeze is ordered. Will continue to follow closely and implement interventions as appropriate. Note abdominal pain/distension continue per nsg doc.      7/17) Patient and family members present during visit. Patient reports he has been eating less than normal for quite some time now with resulting unintentional weight loss --- Per H&P the timeframe of this has been 3 years; per GI APRN note the timeframe has been 6-12 months. Exact time frame of decreased PO intake and unintentional weight loss is unclear. Patient states he weighed 126 lbs 7.5 weeks ago and that his UBW was 160 lbs. Has had minimal PO intake during the past 1 week r/t abdominal pain/distension. Was really hungry yesterday and ate a full meal from a fried chicken restaurant. Finally starting to feel like eating. Has tried some of the Boost/Ensure supplements in the past, but has not been drinking any recently. Denies food allergies and difficulty chewing/swallowing.  Agreeable to orange/wildberry " Boost Breeze. Agreeable to snacks between meals when appropriate PO diet ordered - cottage cheese and fruit, sandwich (peanut butter/jelly, roast beef, turkey, ham and cheese).      Anthropometrics   Height: 71 in  Weight: 119 lbs lbs (standing weight 7/22 per nsg doc) - RD notes ascites  BMI: 16.7  BMI classification: Underweight:<18.5kg/m2   IBW: 172 lbs    Per RD note (7/17):  UBW: ~160 lbs per patient; no weight hx in EMR  Weight change: Patient reports unintentional weight loss of 50 lbs - exact timeframe unclear as H&P states x 3 years and GI APRN note states 6-12 months.    Nutrition Focused Physical Exam (7/17)     Subcutaneous fat:  Orbital Severe   Buccal Severe   Tricep Severe   Ribs- thoracic and lumbar region, lower back, midaxillary line Unable to determine at this time     Muscle:  Temple/temporalis Severe   Clavicle/acromion- pectoralis, deltoid Severe   Shoulder- deltoid Severe   Scapula- trapezius, latissimus dorsi Unable to determine at this time   Interosseous- dorsal hand Unable to determine at this time   Thigh- quadriceps Severe   Calf- gastrocnemius Severe       Labs reviewed   Labs reviewed: Yes    Medications reviewed   Medications reviewed: Yes  Pertinent: creon, remeron, lasix  GTT: 1/2 NS + D5% @ 100 ml/hr  PRN: zofran    Needs Assessment (7/23)   Height used: 71 in  Weight used: 110 lbs/50 kg (actual wt)    Estimated Calories needs: ~1800 calories daily  30-35 kcal/kg = 1608-7316    Estimated Protein needs: ~90 g protein daily  1.5-2.0 g/kg =     Current Nutrition Prescription   PO: Diet Regular   Oral Nutrition Supplement: Boost Plus with each meal, snack 2x daily    Average PO intake: insufficient data    Nutrition Diagnosis     7/17  Problem Malnutrition  (severe, chronic)   Etiology Chronic pancreatitis, abdominal pain/distension, poor appetite   Signs/Symptoms Severe fat loss, severe muscle wasting       7/17, 7/19, 7/21, 7/23  Problem Inadequate oral intake   Etiology  Abdominal distension/pain, early satiety, decreased appetite   Signs/Symptoms Patient began solid food diet 2 days ago (7/21); pt states he has been eating a few bites of each food at mealtimes, was drinking Boost Breeze when he was receiving this but has not been drinking any Boost Plus which began yesterday   Status: ongoing    Intervention   Intervention: Follow treatment progress, Care plan reviewed, Advised available snacks, Interview for preferences, Adjusted supplement, Encourage intake, Education provided    -Discontinued Boost Plus.    -Ordered Boost Breeze with each meal.    -Continue daily snacks (2x daily).    -RD provided patient with nutrition education about ways to increase calorie intake, protein intake, and weight. Patient engaged, asked appropriate questions, verbalized understanding. Patient's UBW was ~160 lbs prior to losing weight. RD advised patient to make this his general weight goal for now and to weigh himself weekly to ensure he is eating enough to gain weight.    Goal:   General: Nutrition support treatment  PO: Increase intake  Additional goals: No further weight loss/promote healthy weight gain    Monitoring/Evaluation:   Monitoring/Evaluation: Per protocol, I&O, PO intake, Supplement intake, Pertinent labs, Weight, GI status, Symptoms, POC/GOC    Saba Barr RD  Time Spent: 45 min

## 2021-07-24 VITALS
HEART RATE: 91 BPM | BODY MASS INDEX: 16.94 KG/M2 | TEMPERATURE: 97.8 F | SYSTOLIC BLOOD PRESSURE: 93 MMHG | RESPIRATION RATE: 18 BRPM | DIASTOLIC BLOOD PRESSURE: 62 MMHG | HEIGHT: 71 IN | WEIGHT: 121 LBS | OXYGEN SATURATION: 95 %

## 2021-07-24 LAB
ALBUMIN SERPL-MCNC: 3 G/DL (ref 3.5–5.2)
ALBUMIN/GLOB SERPL: 1.2 G/DL
ALP SERPL-CCNC: 154 U/L (ref 39–117)
ALT SERPL W P-5'-P-CCNC: 26 U/L (ref 1–41)
ANION GAP SERPL CALCULATED.3IONS-SCNC: 10 MMOL/L (ref 5–15)
AST SERPL-CCNC: 39 U/L (ref 1–40)
BASOPHILS # BLD AUTO: 0.02 10*3/MM3 (ref 0–0.2)
BASOPHILS NFR BLD AUTO: 0.3 % (ref 0–1.5)
BILIRUB SERPL-MCNC: 0.5 MG/DL (ref 0–1.2)
BUN SERPL-MCNC: 25 MG/DL (ref 8–23)
BUN/CREAT SERPL: 30.1 (ref 7–25)
CALCIUM SPEC-SCNC: 8.1 MG/DL (ref 8.6–10.5)
CHLORIDE SERPL-SCNC: 108 MMOL/L (ref 98–107)
CO2 SERPL-SCNC: 20 MMOL/L (ref 22–29)
CREAT SERPL-MCNC: 0.83 MG/DL (ref 0.76–1.27)
DEPRECATED RDW RBC AUTO: 50.4 FL (ref 37–54)
EOSINOPHIL # BLD AUTO: 0.01 10*3/MM3 (ref 0–0.4)
EOSINOPHIL NFR BLD AUTO: 0.1 % (ref 0.3–6.2)
ERYTHROCYTE [DISTWIDTH] IN BLOOD BY AUTOMATED COUNT: 14 % (ref 12.3–15.4)
GFR SERPL CREATININE-BSD FRML MDRD: 92 ML/MIN/1.73
GLOBULIN UR ELPH-MCNC: 2.5 GM/DL
GLUCOSE SERPL-MCNC: 86 MG/DL (ref 65–99)
HCT VFR BLD AUTO: 39.6 % (ref 37.5–51)
HGB BLD-MCNC: 13.3 G/DL (ref 13–17.7)
IMM GRANULOCYTES # BLD AUTO: 0.02 10*3/MM3 (ref 0–0.05)
IMM GRANULOCYTES NFR BLD AUTO: 0.3 % (ref 0–0.5)
LYMPHOCYTES # BLD AUTO: 1.1 10*3/MM3 (ref 0.7–3.1)
LYMPHOCYTES NFR BLD AUTO: 15.2 % (ref 19.6–45.3)
MCH RBC QN AUTO: 33.2 PG (ref 26.6–33)
MCHC RBC AUTO-ENTMCNC: 33.6 G/DL (ref 31.5–35.7)
MCV RBC AUTO: 98.8 FL (ref 79–97)
MONOCYTES # BLD AUTO: 0.44 10*3/MM3 (ref 0.1–0.9)
MONOCYTES NFR BLD AUTO: 6.1 % (ref 5–12)
NEUTROPHILS NFR BLD AUTO: 5.63 10*3/MM3 (ref 1.7–7)
NEUTROPHILS NFR BLD AUTO: 78 % (ref 42.7–76)
NRBC BLD AUTO-RTO: 0 /100 WBC (ref 0–0.2)
PLATELET # BLD AUTO: 226 10*3/MM3 (ref 140–450)
PMV BLD AUTO: 9.9 FL (ref 6–12)
POTASSIUM SERPL-SCNC: 4.1 MMOL/L (ref 3.5–5.2)
POTASSIUM SERPL-SCNC: 4.6 MMOL/L (ref 3.5–5.2)
PROT SERPL-MCNC: 5.5 G/DL (ref 6–8.5)
RBC # BLD AUTO: 4.01 10*6/MM3 (ref 4.14–5.8)
SODIUM SERPL-SCNC: 138 MMOL/L (ref 136–145)
WBC # BLD AUTO: 7.22 10*3/MM3 (ref 3.4–10.8)

## 2021-07-24 PROCEDURE — 99239 HOSP IP/OBS DSCHRG MGMT >30: CPT | Performed by: INTERNAL MEDICINE

## 2021-07-24 PROCEDURE — 80053 COMPREHEN METABOLIC PANEL: CPT | Performed by: INTERNAL MEDICINE

## 2021-07-24 PROCEDURE — 85025 COMPLETE CBC W/AUTO DIFF WBC: CPT | Performed by: INTERNAL MEDICINE

## 2021-07-24 RX ORDER — MIRTAZAPINE 15 MG/1
15 TABLET, FILM COATED ORAL NIGHTLY
Qty: 30 TABLET | Refills: 0 | Status: SHIPPED | OUTPATIENT
Start: 2021-07-24

## 2021-07-24 RX ADMIN — SODIUM CHLORIDE, PRESERVATIVE FREE 10 ML: 5 INJECTION INTRAVENOUS at 09:20

## 2021-07-24 RX ADMIN — PANCRELIPASE 24000 UNITS OF LIPASE: 24000; 76000; 120000 CAPSULE, DELAYED RELEASE PELLETS ORAL at 12:52

## 2021-07-24 RX ADMIN — SODIUM CHLORIDE, PRESERVATIVE FREE 10 ML: 5 INJECTION INTRAVENOUS at 02:14

## 2021-07-24 RX ADMIN — OXYCODONE HYDROCHLORIDE 20 MG: 15 TABLET ORAL at 06:06

## 2021-07-24 RX ADMIN — ASPIRIN 81 MG CHEWABLE TABLET 81 MG: 81 TABLET CHEWABLE at 09:20

## 2021-07-24 RX ADMIN — OXYCODONE HYDROCHLORIDE 20 MG: 15 TABLET ORAL at 14:41

## 2021-07-24 RX ADMIN — FUROSEMIDE 40 MG: 40 TABLET ORAL at 09:20

## 2021-07-24 RX ADMIN — OXYCODONE HYDROCHLORIDE 20 MG: 15 TABLET ORAL at 10:29

## 2021-07-24 RX ADMIN — ACETAMINOPHEN 650 MG: 325 TABLET ORAL at 06:06

## 2021-07-24 RX ADMIN — PANCRELIPASE 24000 UNITS OF LIPASE: 24000; 76000; 120000 CAPSULE, DELAYED RELEASE PELLETS ORAL at 09:20

## 2021-07-24 RX ADMIN — OXYCODONE HYDROCHLORIDE 20 MG: 15 TABLET ORAL at 02:12

## 2021-07-24 RX ADMIN — ACETAMINOPHEN 650 MG: 325 TABLET ORAL at 13:32

## 2021-07-24 RX ADMIN — ACETAMINOPHEN 650 MG: 325 TABLET ORAL at 02:12

## 2021-07-24 NOTE — DISCHARGE SUMMARY
[facility] Medicine Services  DISCHARGE SUMMARY    Patient Name: Abhijit Aldrich  : 1950  MRN: [id number]    Date of Admission: 2021  6:54 PM  Date of Discharge: 2021  Primary Care Physician: Nellie Ocasio MD    Consults     Date and Time Order Name Status Description    2021  8:39 AM Inpatient Palliative Care MD Consult      2021  7:55 AM Inpatient Hematology & Oncology Consult      2021 11:49 PM Inpatient Gastroenterology Consult Completed           Hospital Course     Presenting Problem:   Acute pancreatitis [K85.90]    Active Hospital Problems    Diagnosis  POA   • **Acute pancreatitis [K85.90]  Yes   • Pancreatic mass [K86.89]  Unknown   • Cirrhosis (CMS/HCC) [K74.60]  Unknown   • CAD (coronary artery disease) [I25.10]  Unknown   • Hypertension [I10]  Unknown   • PVD (peripheral vascular disease) (CMS/HCC) [I73.9]  Unknown   • Severe malnutrition (CMS/HCC) [E43]  Unknown   • Anemia [D64.9]  Unknown   • Weight loss [R63.4]  Unknown   • Numbness of right anterior thigh [R20.0]  Unknown      Resolved Hospital Problems   No resolved problems to display.          Hospital Course:  Abhijit Aldrich is a 71 y.o. male with a PMH significant for PVD status post aortic femoral bypass (per patient), history of aortic aneurysm status post repair, tobacco abuse, chronic pancreatitis who presents to the ED due to abdominal pain found to have acute pancreatitis.             Acute on chronic pancreatitis  New diagnosis of pancreatic malignancy  Elevated CA 19-9  -GI consulted, appreciate assistance, MRCP done with abnl biliary dilatation and irregular contour with double duct sign of CBD/main pancreatic duct concerning for pancreatic head lesion  --noted  significantly elevated- concerning for malignancy   -[facility] GB done  with mod/large volume ascites and evidence of liver cirrhosis  -continue percocet PRN at home for pain  -Reports alcohol use remotely  but reports none in years  -Continue home Creon  --GI has ordered further lab studies including AFP, hep A/B serologies- pending  --s/p paracentesis 7/19 with 2.5 L removed, cytology/cx pending, not c/w SBP  ---s/p ERCP 7/20 and again 7/21 with biliary brushings and stent placement. Unable to cannulate CBD, PD stent placement done on 7/20. 7/21 ERCP noted distal CBD stricture c/w pancreatic neoplasm, brushings sent, biliary spincherotomy done with biliary stent placement.   Path still pending from brushings and also fluid cytology from paracentesis pending  ---oncology evaluated 7/22, recommending outpatient referral to [facility] for consideration of [facility]- then f/u with [facility] 3 weeks  ---worsening ascites today--s/p paracentesis x 2 this admission  --palliative consulted for assistance with pain control     Cirrhosis with ascites and abd varices on CT imaging  --paracentesis x 2 this admission  --cont home lasix.  Continue to hold spironolactone as not sure that BP will tolerate     Severe protein calorie malnutrition  Progressive weight loss  -Mildly decreased albumin  -BMI 14.37  -Banana bag x3 days  -Nutrition consulted     Anemia  -monitor, stable at 13.3 at discharge     Numbness to the anterior thigh  -s/p PT/OT eval       CAD  PVD  Hypertension  -Hold spironolactone, Lasix resumed  -Continue home aspirin    Discharge Follow Up Recommendations for outpatient labs/diagnostics:   f/u with PCP in 1 week  Fu with [facility] oncology in 3 weeks  F/u with [facility] asap    Day of Discharge     HPI:   Having trouble tolerating much solid food at a time, as early satiety, but tolerating.  S/p BM this afternoon.  S/p paracentesis yesterday 1450mL cloudy yellow fluid removed, feels much better.  Wants to go home.     Review of Systems  Gen- No fevers, chills  CV- No chest pain, palpitations  Resp- No cough, dyspnea  GI- No N/V/D, +abd pain, +LE edema        Vital Signs:   Temp:  [97.2 °F (36.2 °C)-98.3 °F  (36.8 °C)] 97.8 °F (36.6 °C)  Heart Rate:  [69-94] 91  Resp:  [16-20] 18  BP: ()/(62-89) 93/62     Physical Exam:  Constitutional: No acute distress, awake, alert, chronically ill appearing, cachetic, sitting up in chair  HENT: NCAT, mucous membranes moist  Respiratory: Clear to auscultation bilaterally, respiratory effort normal   Cardiovascular: RRR, no murmurs, rubs, or gallops  Gastrointestinal: Positive bowel sounds, soft, nontender, distended  Musculoskeletal: 3+foot/ankle edema  Psychiatric: Appropriate affect, cooperative  Neurologic: Oriented x 3, strength symmetric in all extremities, Cranial Nerves grossly intact to confrontation, speech clear  Skin: No rashes      Pertinent  and/or Most Recent Results     LAB RESULTS:      Lab 07/24/21 0523 07/23/21 0342 07/22/21 0438 07/21/21  0514 07/19/21 0432   WBC 7.22 6.12 8.04 6.27 5.81   HEMOGLOBIN 13.3 11.7* 12.5* 12.3* 12.2*   HEMATOCRIT 39.6 34.6* 37.9 37.2* 36.3*   PLATELETS 226 199 229 224 226   NEUTROS ABS 5.63 4.58 6.06 4.91 4.17   IMMATURE GRANS (ABS) 0.02 0.02 0.04 0.03 0.01   LYMPHS ABS 1.10 1.05 1.49 0.91 1.26   MONOS ABS 0.44 0.43 0.42 0.42 0.35   EOS ABS 0.01 0.02 0.02 0.00 0.01   MCV 98.8* 98.0* 99.2* 99.7* 97.3*         Lab 07/24/21  0523 07/23/21  2316 07/23/21 0342 07/22/21  0438 07/21/21  0514 07/19/21  0432 07/19/21  0432   SODIUM 138  --  138 136 139  --  135*   POTASSIUM 4.6 4.1 3.6 3.9 5.1   < > 3.4*   CHLORIDE 108*  --  106 103 108*  --  101   CO2 20.0*  --  21.0* 22.0 24.0  --  25.0   ANION GAP 10.0  --  11.0 11.0 7.0  --  9.0   BUN 25*  --  22 17 15  --  10   CREATININE 0.83  --  0.70* 0.71* 0.71*  --  0.51*   GLUCOSE 86  --  79 80 121*  --  95   CALCIUM 8.1*  --  8.1* 8.1* 8.1*  --  8.1*    < > = values in this interval not displayed.         Lab 07/24/21  0523 07/23/21  0342 07/22/21  0438 07/21/21  0514 07/19/21  0432   TOTAL PROTEIN 5.5* 5.0* 5.3* 5.3* 5.2*   ALBUMIN 3.00* 2.60* 2.90* 2.80* 2.90*   GLOBULIN 2.5 2.4 2.4 2.5  2.3   ALT (SGPT) 26 24 26 23 23   AST (SGOT) 39 37 39 36 31   BILIRUBIN 0.5 0.4 0.5 0.3 0.4   ALK PHOS 154* 142* 150* 147* 138*   LIPASE  --   --   --  450*  --                      Brief Urine Lab Results  (Last result in the past 365 days)      Color   Clarity   Blood   Leuk Est   Nitrite   Protein   CREAT   Urine HCG        07/16/21 1946 Yellow Clear Negative Negative Negative Negative             Microbiology Results (last 10 days)     Procedure Component Value - Date/Time    Body Fluid Culture - Body Fluid, Peritoneum [[id number]] Collected: 07/19/21 1520    Lab Status: Final result Specimen: Body Fluid from Peritoneum Updated: 07/22/21 0603     Body Fluid Culture No growth at 3 days     Gram Stain Moderate (3+) WBCs seen      No organisms seen    COVID-19 and FLU A/B PCR - Swab, Nasopharynx [[id number]]  (Normal) Collected: 07/16/21 1934    Lab Status: Final result Specimen: Swab from Nasopharynx Updated: 07/16/21 2012     COVID19 Not Detected     Influenza A PCR Not Detected     Influenza B PCR Not Detected    Narrative:      Fact sheet for providers: https://www.fda.gov/media/611301/download    Fact sheet for patients: https://www.fda.gov/media/742770/download    Test performed by PCR.          Adult Transthoracic Echo Complete w/ Color, Spectral and Contrast if necessary per protocol    Result Date: 7/17/2021  · The study is technically difficult for diagnosis with poor acoustic windows. The quality of the study is extremely limited due to limited views obtained, patient body habitus and patient positioning. · Left ventricular ejection fraction appears to be 51 - 55%. Left ventricular systolic function is normal. · There is calcification of the aortic valve. · Mild aortic valve regurgitation is present. · Mild mitral valve regurgitation is present.      CT Abdomen Pelvis With Contrast    Result Date: 7/16/2021  CT Abdomen Pelvis W INDICATION: Generalized abdominal pain with pancreatitis and cirrhosis  TECHNIQUE: CT of the abdomen and pelvis with IV contrast. Coronal and sagittal reconstructions were obtained.  Radiation dose reduction techniques included automated exposure control or exposure modulation based on body size. Count of known CT and cardiac nuc med studies performed in previous 12 months: 0. COMPARISON: None available. FINDINGS: Limited exam. Abdomen: Multiple small noncalcified nodules are seen at the lung bases. These may be chronic. There is ascites with cirrhosis. The pancreas is not well evaluated and there may be chronic prominence to the pancreatic duct. Pelvis: The patient appears cachectic with diffuse anasarca.  here is an S-shaped scoliosis. There is multilevel degenerative change involving the spine. There has been prior left hip arthroplasty.     1. There is cirrhosis with ascites. The gallbladder is somewhat distended, and it is uncertain if there may be some inflammatory change around the pancreas. This area is of limited evaluation. 2. There are small noncalcified nodules at the lung bases. Consider obtaining prior chest CT if available for comparison. Signer Name: Tsering Frias MD  Signed: 7/16/2021 9:52 PM  Workstation Name: [id number]  [facility] ERCP pancreatic and biliary ducts    Result Date: 7/21/2021  EXAMINATION: FL ERCP PANCREATIC AND BILIARY DUCTS-  INDICATION: ercp; K85.00-Idiopathic acute pancreatitis without necrosis or infection; K74.69-Other cirrhosis of liver; E43-Unspecified severe protein-calorie malnutrition; K86.89-Other specified diseases of pancreas  COMPARISON: NONE  FINDINGS: 1 minute 10 seconds of fluoroscopy time provided with 3 images stored during ERCP      1 minute 10 seconds of fluoroscopy time provided with 3 images stored during ERCP  This report was finalized on 7/21/2021 5:54 PM by Apollo Winters.      FL ERCP pancreatic and biliary ducts    Result Date: 7/21/2021  EXAMINATION: FL ERCP PANCREATIC AND BILIARY DUCTS-   INDICATION: ercp; K85.00-Idiopathic acute pancreatitis without necrosis or infection; K74.69-Other cirrhosis of liver; E43-Unspecified severe protein-calorie malnutrition  COMPARISON: NONE  FINDINGS: 1 minute 32 seconds of fluoroscopy time provided with one image saved during ERCP      1 minute 32 seconds of fluoroscopy time provided with one image saved during ERCP  This report was finalized on 7/21/2021 8:28 AM by Apollo Winters.      US Gallbladder    Result Date: 7/18/2021  EXAMINATION: US GALLBLADDER-  INDICATION: eval for stones; K85.00-Idiopathic acute pancreatitis without necrosis or infection  COMPARISON: NONE  FINDINGS: Sonographic grayscale and color Doppler evaluation of the right upper quadrant demonstrates  Increased echogenicity and nodular liver surface contour compatible with parenchymal disease and cirrhosis. No suspicious focal hepatic lesions or biliary ductal dilatation. Cholelithiasis is present with some small stones and sludge, without definite wall thickening. Unremarkable 5 mm common bile duct.  Right kidney measures 9.7 cm in length, without apparent mass or hydronephrosis. Normal color Doppler flow.      Redemonstrated moderate to large volume ascites and abnormal appearance of the hepatic parenchyma compatible with parenchymal disease if not kartik cirrhosis.  Cholelithiasis is present without definite changes of cholecystitis.  This report was finalized on 7/18/2021 7:52 AM by Apollo Winters.      MRI abdomen w wo contrast mrcp    Result Date: 7/19/2021  EXAMINATION: MRI ABDOMEN W WO CONTRAST MRCP-  INDICATION: Concerns for pancreatic head mass with PD dilation; K85.00-Idiopathic acute pancreatitis without necrosis or infection.  TECHNIQUE: Multiplanar MRI of the abdomen with and without intravenous contrast administration. MRCP sequences performed including 3-D T2 space coronal sequence performed for analysis.    COMPARISON: Ultrasound right upper quadrant 07/17/2021 and CT abdomen  and pelvis 07/16/2021.  FINDINGS: Lung bases limited in evaluation without gross abnormality or effusion. Liver demonstrates abnormal low signal and heterogeneous appearance remaining consistent with previously identified parenchymal findings along with perihepatic ascites. Postcontrast sequences in particular arterial phase imaging without arterial hyperenhancement or evidence for washout characteristics at any focal area within the liver.   Gallbladder is distended without filling defect clearly evident of cholelithiasis. Intrahepatic dilatation greatest on the left along with irregularities and areas of potential stricturing or narrowing contour in the distal CBD for example series 27 image 29 and 30 without distinct filling defect to suggest choledocholithiasis.  No pancreas divisum anatomy although at least mild to moderately enlarged main pancreatic duct for example series 12 image 14 extending to the distal body of the pancreas up to 4 to 5 mm of abnormal increased or dilated caliber with indistinct margins of early arterial phase imaging pancreatic head concerning for infiltrating or underlying mass lesion difficult to measure as this is somewhat indistinct for example series 12 image 15 and series 33 image 53 and ill-defined margins in the peripancreatic region concerning for potential secondary inflammatory process or pancreatitis without focal fluid collection or cystic lesion otherwise evident.  Spleen is unremarkable. Adrenals without distinct nodule. Kidneys without hydronephrosis or hydroureter. No bulky retroperitoneal adenopathy. Patent nonaneurysmal abdominal aorta. Portal veins are poorly visualized on delayed phase sequencing and limited for evaluation. Visualized GI tract unremarkable with motion degradation, however no gross disproportion dilatation with at least small to moderate volume abdominopelvic ascites in the perihepatic and perisplenic regions.         Abnormal biliary dilatation and  irregular contours along with a double duct sign of dilated CBD and main pancreatic duct extending into the distal body and tail of the pancreas concerning for pancreatic head lesion despite measurable or distinct identifiable mass there is somewhat indistinct margins and signal on early arterial phase imaging with adenocarcinoma of the pancreas of highest consideration given overall appearance with a prominent left-sided intrahepatic biliary dilatation and gallbladder distention. Perisplenic and perihepatic ascites with at least some heterogeneous signal of the liver parenchyma. No splenomegaly is evident and no distinct nodular contours of the hepatic parenchyma to definitively characterize cirrhosis on this examination along with lack of hepatic lesion evident. Ill-defined enhancement better seen on uncinate process and inferior head of the pancreas comparison CT examination from 07/16/2021 series 2 image 36 of around 3 cm focus concerning for pancreatic lesion with tissue sampling considered for further evaluation.  D:  07/18/2021 E:  07/19/2021   This report was finalized on 7/19/2021 12:19 PM by Dr. Emmanuel Barry.      CT Guided Paracentesis    Result Date: 7/23/2021  EXAMINATION: CT GUIDED PARACENTESIS-  INDICATION: History of pancreatic mass, pancreatitis and ascites. Presents to interventional radiology for CT-guided paracentesis.  TECHNIQUE: After informed written consent from the patient, the patient was positioned supine on the CT table.  CT of the abdomen revealed a moderate amount of ascites. Appropriate right lower quadrant access site was marked. The right lower quadrant was prepped and draped in the normal sterile fashion. 1% lidocaine was administered for local anesthesia. Under CT guidance, an 8.5 Chinese nonlocking pigtail catheter was advanced via the trocar technique into the right lower quadrant abdomen. There was immediate aspiration of cloudy yellow ascites. 1450 mL of yellow ascites was  aspirated successfully. The drain was subsequently removed. Follow-up CT of the abdomen showed partial resolution of previously seen ascites. Sterile dressing was applied. Patient tolerated the procedure well.  The radiation dose reduction device was turned on for each scan per the ALARA (As Low as Reasonably Achievable) protocol.  COMPARISON: NONE  FINDINGS: Moderate ascites. 1450 mL of cloudy yellow ascites was removed successfully.      Successful paracentesis via right lower quadrant approach, CT-guided producing 1450 mL of yellow ascites.  This report was finalized on 7/23/2021 5:47 PM by Oliver Torres.      CT Guided Paracentesis    Result Date: 7/21/2021  Clinical Indication:  72 year-old male with history of chronic pancreatitis and large amount of ascites.   Procedure:  CT guided paracentesis   :  Dr. Felix   Complication:  None apparent.   Technique:  The right mid-abdominal region was prepped and draped in the usual/sterile fashion.  Local anesthesia with Lidocaine was performed.  Utilizing CT localization, a 8.5 Frisian drainage catheter placed into ascitic fluid centered in the right paracolic gutter.  A sample fluid was sent for laboratory analysis, per the referring service.  Approximately 2500 cc's of ascitic fluid was drained off.  The drainage catheter was removed and a sterile dressing was applied to the access site.  The patient tolerated the procedure well without complication.   Impression:  Technically successful CT guided paracentesis.              Results for orders placed during the hospital encounter of 07/16/21    Adult Transthoracic Echo Complete w/ Color, Spectral and Contrast if necessary per protocol    Interpretation Summary  · The study is technically difficult for diagnosis with poor acoustic windows. The quality of the study is extremely limited due to limited views obtained, patient body habitus and patient positioning.  · Left ventricular ejection fraction appears to be  51 - 55%. Left ventricular systolic function is normal.  · There is calcification of the aortic valve.  · Mild aortic valve regurgitation is present.  · Mild mitral valve regurgitation is present.      Plan for Follow-up of Pending Labs/Results:   Pending Labs     Order Current Status    Non-gynecologic Cytology In process        Discharge Details        Discharge Medications      New Medications      Instructions Start Date   mirtazapine 15 MG tablet  Commonly known as: REMERON   15 mg, Oral, Nightly      oxyCODONE 20 MG tablet  Commonly known as: ROXICODONE   20 mg, Oral, Every 4 Hours PRN         Continue These Medications      Instructions Start Date   aspirin 81 MG chewable tablet   81 mg, Oral, Daily      furosemide 40 MG tablet  Commonly known as: LASIX   40 mg, Oral, 2 Times Daily      pancrelipase (Lip-Prot-Amyl) 00882-68407 units capsule delayed-release particles capsule  Commonly known as: CREON   24,000 units of lipase, Oral, 3 Times Daily With Meals         Stop These Medications    spironolactone 25 MG tablet  Commonly known as: ALDACTONE            No Known Allergies      Discharge Disposition:  Home or Self Care    Diet:  Hospital:  Diet Order   Procedures   • Diet Regular       Activity:      Restrictions or Other Recommendations:         CODE STATUS:    Code Status and Medical Interventions:   Ordered at: 07/16/21 6013     Level Of Support Discussed With:    Patient     Code Status:    CPR     Medical Interventions (Level of Support Prior to Arrest):    Full       Additional Instructions for the Follow-ups that You Need to Schedule      Discharge Follow-up with PCP   As directed         Currently Documented PCP:    Nellie Ocasio MD    PCP Phone Number:    [phone]      Follow Up Details: in 1 week           Discharge Follow-up with Specified Provider: [facility]   As directed        To: [facility]           Discharge Follow-up with Specified Provider: heme onc   As  directed        To: heme onc          F/u [facility] oncology in 3 weeks            Chad Freeman MD  07/24/21      Time Spent on Discharge:  I spent  40  minutes on this discharge activity which included: face-to-face encounter with the patient, reviewing the data in the system, coordination of the care with the nursing staff as well as consultants, documentation, and entering orders.

## 2021-07-24 NOTE — PLAN OF CARE
Goal Outcome Evaluation:  Plan of Care Reviewed With: patient            VSS. Pt c/o pain during this shift, and pain medication has been given. It seemed to help as pt was able to sleep for short periods of time. He did try to eat a little of his fruit plate that was brought up on his tray for a bedtime snack. Other than that he did not eat much that was on his dinner tray. He ambulated in his room to and from the bathroom unassisted.

## 2021-07-25 ENCOUNTER — READMISSION MANAGEMENT (OUTPATIENT)
Dept: CALL CENTER | Facility: HOSPITAL | Age: 71
End: 2021-07-25

## 2021-07-25 NOTE — OUTREACH NOTE
Prep Survey      Responses   Religion facility patient discharged from?  [place]   Is LACE score < 7 ?  No   Emergency Room discharge w/ pulse ox?  No   Eligibility  Readm Mgmt   Discharge diagnosis  Acute pancreatitis,  ERCP   Does the patient have one of the following disease processes/diagnoses(primary or secondary)?  General Surgery   Does the patient have Home health ordered?  No   Is there a DME ordered?  No   Prep survey completed?  Yes          Alejandra Emmanuel RN

## 2021-07-27 LAB
LAB AP CASE REPORT: NORMAL
PATH REPORT.FINAL DX SPEC: NORMAL

## 2021-07-28 ENCOUNTER — READMISSION MANAGEMENT (OUTPATIENT)
Dept: CALL CENTER | Facility: HOSPITAL | Age: 71
End: 2021-07-28

## 2021-07-28 DIAGNOSIS — K83.1 BILIARY STRICTURE: Primary | ICD-10-CM

## 2021-07-28 NOTE — ANESTHESIA POSTPROCEDURE EVALUATION
Patient: Abhijit Aldrich    Procedure Summary     Date: 07/21/21 Room / Location: [facility] ENDOSCOPY 3 /  [facility]    Anesthesia Start: 1626 Anesthesia Stop: 1710    Procedure: ENDOSCOPIC RETROGRADE CHOLANGIOPANCREATOGRAPHY WITH STENT PLACEMENT (N/A ) Diagnosis:       Pancreatic mass      (Pancreatic mass [K86.89])    Surgeons: Jose Erickson MD Provider: Michael Coles MD    Anesthesia Type: general ASA Status: 3          Anesthesia Type: general    Vitals  Vitals Value Taken Time   BP 93/62 07/24/21 1200   Temp 97.8 °F (36.6 °C) 07/24/21 1200   Pulse 91 07/24/21 1200   Resp 18 07/24/21 1200   SpO2 95 % 07/24/21 0918           Post Anesthesia Care and Evaluation    Patient location during evaluation: PACU  Patient participation: complete - patient participated  Level of consciousness: awake  Pain score: 0  Pain management: adequate  Airway patency: patent  Anesthetic complications: No anesthetic complications  PONV Status: noneRespiratory status: acceptable  Hydration status: acceptable

## 2021-07-28 NOTE — OUTREACH NOTE
General Surgery Week 1 Survey      Responses   [facility] patient discharged from?  [place]   Does the patient have one of the following disease processes/diagnoses(primary or secondary)?  General Surgery   Week 1 attempt successful?  Yes   Call start time  1642   Call end time  1648   Discharge diagnosis  Acute pancreatitis,  ERCP   Is patient permission given to speak with other caregiver?  Yes   List who call center can speak with  aBrbara daughter   Person spoke with today (if not patient) and relationship  Barbara, daughter   Meds reviewed with patient/caregiver?  Yes   Does the patient have all medications related to this admission filled (includes all antibiotics, pain medications, etc.)  Yes   Is the patient taking all medications as directed (includes completed medication regime)?  Yes   Does the patient have a follow up appointment scheduled with their surgeon?  Yes   Has the patient kept scheduled appointments due by today?  Yes   Has home health visited the patient within 72 hours of discharge?  N/A   Psychosocial issues?  No   Did the patient receive a copy of their discharge instructions?  Yes   Nursing interventions  Reviewed instructions with patient [daughter]   What is the patient's perception of their health status since discharge?  Same [Daughter states that patient still having ascites. ]   Nursing interventions  Nurse provided patient education, Advised patient to call provider   Is the patient/caregiver able to teach back signs and symptoms of incisional infection?  Fever   Is the patient/caregiver able to teach back steps to recovery at home?  Set small, achievable goals for return to baseline health, Rest and rebuild strength, gradually increase activity, Eat a well-balance diet   Is the patient/caregiver able to teach back the hierarchy of who to call/visit for symptoms/problems? PCP, Specialist, Home health nurse, Urgent Care, ED, 911  Yes   Week 1 call completed?  Yes   Wrap up  additional comments  Daughter states that they are working hard on trying to get more calories in patient. She states that he is taking in some baby foods.           Kiarra Aiken RN

## 2021-07-29 ENCOUNTER — TELEPHONE (OUTPATIENT)
Dept: PALLIATIVE CARE | Facility: CLINIC | Age: 71
End: 2021-07-29

## 2021-07-29 DIAGNOSIS — K86.89 PANCREATIC MASS: Primary | ICD-10-CM

## 2021-07-29 NOTE — TELEPHONE ENCOUNTER
Received VM from pt's daughter that she needed to discuss medications that were prescribed to her dad during his inpt hospital stay. She was at work and requested a call at the number documented. Returned call and no answer/no voicemail. Attempted to call contact # listed in chart for dtr and there was no answer and VM was full and not accepting messages.  [place] is that pt is in need of a refill of pain medication. Pt is not established with palliative care and does not have a scheduled apt due to clinic transition. Will route call to pt's onc team as they will be managing in the interim.

## 2021-07-29 NOTE — TELEPHONE ENCOUNTER
Returned call to patient' At this time he informs nurse that he need his belly tapped again that it's swelling. Informing patient nurse could get it lined out to have paracentesis before his visit with Dr. Morales on 16. Patient in agreement.     Upon hanging up with patient, Patient's daughter called to informing nurse that they will need refill on pain medication. Informing patient's daughter that nurse will discuss with Dr. Morales to see if he will send refill. Patient's daughter stating she understood and patient had enough till Sunday.

## 2021-07-30 ENCOUNTER — TELEPHONE (OUTPATIENT)
Dept: GASTROENTEROLOGY | Facility: CLINIC | Age: 71
End: 2021-07-30

## 2021-08-05 ENCOUNTER — READMISSION MANAGEMENT (OUTPATIENT)
Dept: CALL CENTER | Facility: HOSPITAL | Age: 71
End: 2021-08-05

## 2021-08-05 NOTE — OUTREACH NOTE
General Surgery Week 2 Survey      Responses   [facility] patient discharged from?  [place]   Does the patient have one of the following disease processes/diagnoses(primary or secondary)?  General Surgery   Week 2 attempt successful?  Yes   Call start time  1252   Revoke  Decline to participate [Someone hung up after saying they were Mr. Duong]   Call end time  1252   Discharge diagnosis  Acute pancreatitis,  ERCP          Sabina Sorensen RN

## 2021-08-06 ENCOUNTER — HOSPITAL ENCOUNTER (INPATIENT)
Facility: HOSPITAL | Age: 71
LOS: 3 days | Discharge: HOME | End: 2021-08-09
Attending: EMERGENCY MEDICINE | Admitting: HOSPITALIST

## 2021-08-06 ENCOUNTER — APPOINTMENT (OUTPATIENT)
Dept: GENERAL RADIOLOGY | Facility: HOSPITAL | Age: 71
End: 2021-08-06

## 2021-08-06 ENCOUNTER — APPOINTMENT (OUTPATIENT)
Dept: CARDIOLOGY | Facility: HOSPITAL | Age: 71
End: 2021-08-06

## 2021-08-06 ENCOUNTER — APPOINTMENT (OUTPATIENT)
Dept: CT IMAGING | Facility: HOSPITAL | Age: 71
End: 2021-08-06

## 2021-08-06 DIAGNOSIS — R18.0 MALIGNANT ASCITES: ICD-10-CM

## 2021-08-06 DIAGNOSIS — R10.84 GENERALIZED ABDOMINAL PAIN: ICD-10-CM

## 2021-08-06 DIAGNOSIS — R60.0 BILATERAL LOWER EXTREMITY EDEMA: Primary | ICD-10-CM

## 2021-08-06 DIAGNOSIS — E43 SEVERE MALNUTRITION: ICD-10-CM

## 2021-08-06 DIAGNOSIS — R77.8 ELEVATED TROPONIN: ICD-10-CM

## 2021-08-06 DIAGNOSIS — K86.89 PANCREATIC MASS: ICD-10-CM

## 2021-08-06 DIAGNOSIS — R06.02 SHORTNESS OF BREATH: ICD-10-CM

## 2021-08-06 PROBLEM — C25.9 PANCREATIC CANCER (HCC): Status: ACTIVE | Noted: 2021-08-06

## 2021-08-06 PROBLEM — F12.90 MARIJUANA USER: Chronic | Status: ACTIVE | Noted: 2021-08-06

## 2021-08-06 PROBLEM — R79.89 ELEVATED BRAIN NATRIURETIC PEPTIDE (BNP) LEVEL: Status: ACTIVE | Noted: 2021-08-06

## 2021-08-06 PROBLEM — Z72.0 TOBACCO ABUSE: Chronic | Status: ACTIVE | Noted: 2021-08-06

## 2021-08-06 LAB
ALBUMIN SERPL-MCNC: 2.5 G/DL (ref 3.5–5.2)
ALBUMIN/GLOB SERPL: 0.6 G/DL
ALP SERPL-CCNC: 218 U/L (ref 39–117)
ALT SERPL W P-5'-P-CCNC: 36 U/L (ref 1–41)
ANION GAP SERPL CALCULATED.3IONS-SCNC: 13 MMOL/L (ref 5–15)
AST SERPL-CCNC: 47 U/L (ref 1–40)
BASOPHILS # BLD AUTO: 0.01 10*3/MM3 (ref 0–0.2)
BASOPHILS NFR BLD AUTO: 0.1 % (ref 0–1.5)
BILIRUB SERPL-MCNC: 0.6 MG/DL (ref 0–1.2)
BILIRUB UR QL STRIP: NEGATIVE
BUN SERPL-MCNC: 69 MG/DL (ref 8–23)
BUN/CREAT SERPL: 66.3 (ref 7–25)
CALCIUM SPEC-SCNC: 8.7 MG/DL (ref 8.6–10.5)
CHLORIDE SERPL-SCNC: 91 MMOL/L (ref 98–107)
CLARITY UR: CLEAR
CO2 SERPL-SCNC: 26 MMOL/L (ref 22–29)
COLOR UR: YELLOW
CORTIS SERPL-MCNC: 43.57 MCG/DL
CREAT SERPL-MCNC: 1.04 MG/DL (ref 0.76–1.27)
D DIMER PPP FEU-MCNC: 3.61 MCGFEU/ML (ref 0–0.56)
D-LACTATE SERPL-SCNC: 1.7 MMOL/L (ref 0.5–2)
DEPRECATED RDW RBC AUTO: 46.2 FL (ref 37–54)
EOSINOPHIL # BLD AUTO: 0 10*3/MM3 (ref 0–0.4)
EOSINOPHIL NFR BLD AUTO: 0 % (ref 0.3–6.2)
ERYTHROCYTE [DISTWIDTH] IN BLOOD BY AUTOMATED COUNT: 13.7 % (ref 12.3–15.4)
GFR SERPL CREATININE-BSD FRML MDRD: 71 ML/MIN/1.73
GLOBULIN UR ELPH-MCNC: 3.9 GM/DL
GLUCOSE SERPL-MCNC: 97 MG/DL (ref 65–99)
GLUCOSE UR STRIP-MCNC: NEGATIVE MG/DL
HCT VFR BLD AUTO: 36.9 % (ref 37.5–51)
HGB BLD-MCNC: 13.1 G/DL (ref 13–17.7)
HGB UR QL STRIP.AUTO: NEGATIVE
HOLD SPECIMEN: NORMAL
IMM GRANULOCYTES # BLD AUTO: 0.03 10*3/MM3 (ref 0–0.05)
IMM GRANULOCYTES NFR BLD AUTO: 0.3 % (ref 0–0.5)
INR PPP: 1.27 (ref 0.85–1.16)
KETONES UR QL STRIP: NEGATIVE
LEUKOCYTE ESTERASE UR QL STRIP.AUTO: NEGATIVE
LIPASE SERPL-CCNC: 144 U/L (ref 13–60)
LYMPHOCYTES # BLD AUTO: 0.49 10*3/MM3 (ref 0.7–3.1)
LYMPHOCYTES NFR BLD AUTO: 5 % (ref 19.6–45.3)
MCH RBC QN AUTO: 32.8 PG (ref 26.6–33)
MCHC RBC AUTO-ENTMCNC: 35.5 G/DL (ref 31.5–35.7)
MCV RBC AUTO: 92.5 FL (ref 79–97)
MONOCYTES # BLD AUTO: 0.25 10*3/MM3 (ref 0.1–0.9)
MONOCYTES NFR BLD AUTO: 2.6 % (ref 5–12)
NEUTROPHILS NFR BLD AUTO: 8.93 10*3/MM3 (ref 1.7–7)
NEUTROPHILS NFR BLD AUTO: 92 % (ref 42.7–76)
NITRITE UR QL STRIP: NEGATIVE
NRBC BLD AUTO-RTO: 0 /100 WBC (ref 0–0.2)
NT-PROBNP SERPL-MCNC: ABNORMAL PG/ML (ref 0–900)
PH UR STRIP.AUTO: <=5 [PH] (ref 5–8)
PLAT MORPH BLD: NORMAL
PLATELET # BLD AUTO: 379 10*3/MM3 (ref 140–450)
PMV BLD AUTO: 10.7 FL (ref 6–12)
POTASSIUM SERPL-SCNC: 4.6 MMOL/L (ref 3.5–5.2)
PROT SERPL-MCNC: 6.4 G/DL (ref 6–8.5)
PROT UR QL STRIP: NEGATIVE
PROTHROMBIN TIME: 15.5 SECONDS (ref 11.4–14.4)
QT INTERVAL: 426 MS
QTC INTERVAL: 523 MS
RBC # BLD AUTO: 3.99 10*6/MM3 (ref 4.14–5.8)
RBC MORPH BLD: NORMAL
SARS-COV-2 RNA RESP QL NAA+PROBE: NOT DETECTED
SODIUM SERPL-SCNC: 130 MMOL/L (ref 136–145)
SP GR UR STRIP: 1.02 (ref 1–1.03)
TROPONIN T SERPL-MCNC: 0.03 NG/ML (ref 0–0.03)
TROPONIN T SERPL-MCNC: 0.04 NG/ML (ref 0–0.03)
TROPONIN T SERPL-MCNC: 0.04 NG/ML (ref 0–0.03)
UROBILINOGEN UR QL STRIP: NORMAL
WBC # BLD AUTO: 9.71 10*3/MM3 (ref 3.4–10.8)
WBC MORPH BLD: NORMAL
WHOLE BLOOD HOLD SPECIMEN: NORMAL

## 2021-08-06 PROCEDURE — 71045 X-RAY EXAM CHEST 1 VIEW: CPT

## 2021-08-06 PROCEDURE — 85610 PROTHROMBIN TIME: CPT | Performed by: NURSE PRACTITIONER

## 2021-08-06 PROCEDURE — 83880 ASSAY OF NATRIURETIC PEPTIDE: CPT

## 2021-08-06 PROCEDURE — 93970 EXTREMITY STUDY: CPT

## 2021-08-06 PROCEDURE — 25010000002 FUROSEMIDE PER 20 MG: Performed by: PHYSICIAN ASSISTANT

## 2021-08-06 PROCEDURE — 84484 ASSAY OF TROPONIN QUANT: CPT

## 2021-08-06 PROCEDURE — 85379 FIBRIN DEGRADATION QUANT: CPT | Performed by: INTERNAL MEDICINE

## 2021-08-06 PROCEDURE — 99284 EMERGENCY DEPT VISIT MOD MDM: CPT

## 2021-08-06 PROCEDURE — 93970 EXTREMITY STUDY: CPT | Performed by: INTERNAL MEDICINE

## 2021-08-06 PROCEDURE — 85025 COMPLETE CBC W/AUTO DIFF WBC: CPT

## 2021-08-06 PROCEDURE — 81003 URINALYSIS AUTO W/O SCOPE: CPT | Performed by: PHYSICIAN ASSISTANT

## 2021-08-06 PROCEDURE — 99222 1ST HOSP IP/OBS MODERATE 55: CPT | Performed by: INTERNAL MEDICINE

## 2021-08-06 PROCEDURE — 84484 ASSAY OF TROPONIN QUANT: CPT | Performed by: PHYSICIAN ASSISTANT

## 2021-08-06 PROCEDURE — U0003 INFECTIOUS AGENT DETECTION BY NUCLEIC ACID (DNA OR RNA); SEVERE ACUTE RESPIRATORY SYNDROME CORONAVIRUS 2 (SARS-COV-2) (CORONAVIRUS DISEASE [COVID-19]), AMPLIFIED PROBE TECHNIQUE, MAKING USE OF HIGH THROUGHPUT TECHNOLOGIES AS DESCRIBED BY CMS-2020-01-R: HCPCS | Performed by: INTERNAL MEDICINE

## 2021-08-06 PROCEDURE — 85007 BL SMEAR W/DIFF WBC COUNT: CPT

## 2021-08-06 PROCEDURE — 83690 ASSAY OF LIPASE: CPT | Performed by: PHYSICIAN ASSISTANT

## 2021-08-06 PROCEDURE — 25010000002 IOPAMIDOL 61 % SOLUTION: Performed by: EMERGENCY MEDICINE

## 2021-08-06 PROCEDURE — U0005 INFEC AGEN DETEC AMPLI PROBE: HCPCS | Performed by: INTERNAL MEDICINE

## 2021-08-06 PROCEDURE — 83605 ASSAY OF LACTIC ACID: CPT | Performed by: PHYSICIAN ASSISTANT

## 2021-08-06 PROCEDURE — 82533 TOTAL CORTISOL: CPT | Performed by: INTERNAL MEDICINE

## 2021-08-06 PROCEDURE — 80053 COMPREHEN METABOLIC PANEL: CPT

## 2021-08-06 PROCEDURE — 93005 ELECTROCARDIOGRAM TRACING: CPT

## 2021-08-06 PROCEDURE — 74177 CT ABD & PELVIS W/CONTRAST: CPT

## 2021-08-06 PROCEDURE — 84484 ASSAY OF TROPONIN QUANT: CPT | Performed by: NURSE PRACTITIONER

## 2021-08-06 RX ORDER — FUROSEMIDE 40 MG/1
40 TABLET ORAL
Status: DISCONTINUED | OUTPATIENT
Start: 2021-08-07 | End: 2021-08-09 | Stop reason: HOSPADM

## 2021-08-06 RX ORDER — POLYETHYLENE GLYCOL 3350 17 G/17G
17 POWDER, FOR SOLUTION ORAL DAILY PRN
Status: DISCONTINUED | OUTPATIENT
Start: 2021-08-06 | End: 2021-08-09 | Stop reason: HOSPADM

## 2021-08-06 RX ORDER — SODIUM CHLORIDE 0.9 % (FLUSH) 0.9 %
10 SYRINGE (ML) INJECTION AS NEEDED
Status: DISCONTINUED | OUTPATIENT
Start: 2021-08-06 | End: 2021-08-09 | Stop reason: HOSPADM

## 2021-08-06 RX ORDER — BISACODYL 5 MG/1
5 TABLET, DELAYED RELEASE ORAL DAILY PRN
Status: DISCONTINUED | OUTPATIENT
Start: 2021-08-06 | End: 2021-08-09 | Stop reason: HOSPADM

## 2021-08-06 RX ORDER — SODIUM CHLORIDE 0.9 % (FLUSH) 0.9 %
10 SYRINGE (ML) INJECTION EVERY 12 HOURS SCHEDULED
Status: DISCONTINUED | OUTPATIENT
Start: 2021-08-06 | End: 2021-08-09 | Stop reason: HOSPADM

## 2021-08-06 RX ORDER — ASPIRIN 81 MG/1
81 TABLET, CHEWABLE ORAL DAILY
Status: DISCONTINUED | OUTPATIENT
Start: 2021-08-07 | End: 2021-08-09 | Stop reason: HOSPADM

## 2021-08-06 RX ORDER — HYDROCODONE BITARTRATE AND ACETAMINOPHEN 5; 325 MG/1; MG/1
1 TABLET ORAL EVERY 6 HOURS PRN
Status: DISCONTINUED | OUTPATIENT
Start: 2021-08-06 | End: 2021-08-07

## 2021-08-06 RX ORDER — FUROSEMIDE 10 MG/ML
20 INJECTION INTRAMUSCULAR; INTRAVENOUS ONCE
Status: COMPLETED | OUTPATIENT
Start: 2021-08-06 | End: 2021-08-06

## 2021-08-06 RX ORDER — BISACODYL 10 MG
10 SUPPOSITORY, RECTAL RECTAL DAILY PRN
Status: DISCONTINUED | OUTPATIENT
Start: 2021-08-06 | End: 2021-08-09 | Stop reason: HOSPADM

## 2021-08-06 RX ORDER — MIRTAZAPINE 15 MG/1
15 TABLET, FILM COATED ORAL NIGHTLY
Status: DISCONTINUED | OUTPATIENT
Start: 2021-08-06 | End: 2021-08-07

## 2021-08-06 RX ORDER — AMOXICILLIN 250 MG
2 CAPSULE ORAL 2 TIMES DAILY
Status: DISCONTINUED | OUTPATIENT
Start: 2021-08-06 | End: 2021-08-09 | Stop reason: HOSPADM

## 2021-08-06 RX ORDER — NICOTINE 21 MG/24HR
1 PATCH, TRANSDERMAL 24 HOURS TRANSDERMAL
Status: DISCONTINUED | OUTPATIENT
Start: 2021-08-06 | End: 2021-08-09 | Stop reason: HOSPADM

## 2021-08-06 RX ADMIN — HYDROCODONE BITARTRATE AND ACETAMINOPHEN 1 TABLET: 5; 325 TABLET ORAL at 21:22

## 2021-08-06 RX ADMIN — MIRTAZAPINE 15 MG: 15 TABLET, FILM COATED ORAL at 21:22

## 2021-08-06 RX ADMIN — IOPAMIDOL 80 ML: 612 INJECTION, SOLUTION INTRAVENOUS at 16:09

## 2021-08-06 RX ADMIN — FUROSEMIDE 20 MG: 10 INJECTION INTRAMUSCULAR; INTRAVENOUS at 15:58

## 2021-08-06 RX ADMIN — DOCUSATE SODIUM 50MG AND SENNOSIDES 8.6MG 2 TABLET: 8.6; 5 TABLET, FILM COATED ORAL at 21:22

## 2021-08-06 RX ADMIN — SODIUM CHLORIDE, PRESERVATIVE FREE 10 ML: 5 INJECTION INTRAVENOUS at 21:25

## 2021-08-07 ENCOUNTER — APPOINTMENT (OUTPATIENT)
Dept: INTERVENTIONAL RADIOLOGY/VASCULAR | Facility: HOSPITAL | Age: 71
End: 2021-08-07

## 2021-08-07 LAB
ALBUMIN SERPL-MCNC: 2.4 G/DL (ref 3.5–5.2)
ALBUMIN/GLOB SERPL: 0.7 G/DL
ALP SERPL-CCNC: 215 U/L (ref 39–117)
ALT SERPL W P-5'-P-CCNC: 36 U/L (ref 1–41)
ANION GAP SERPL CALCULATED.3IONS-SCNC: 13 MMOL/L (ref 5–15)
APPEARANCE FLD: ABNORMAL
AST SERPL-CCNC: 46 U/L (ref 1–40)
BASOPHILS # BLD MANUAL: 0 10*3/MM3 (ref 0–0.2)
BASOPHILS NFR BLD AUTO: 0 % (ref 0–1.5)
BILIRUB SERPL-MCNC: 0.7 MG/DL (ref 0–1.2)
BUN SERPL-MCNC: 71 MG/DL (ref 8–23)
BUN/CREAT SERPL: 71 (ref 7–25)
CALCIUM SPEC-SCNC: 8.4 MG/DL (ref 8.6–10.5)
CHLORIDE SERPL-SCNC: 88 MMOL/L (ref 98–107)
CO2 SERPL-SCNC: 26 MMOL/L (ref 22–29)
COLOR FLD: YELLOW
CREAT SERPL-MCNC: 1 MG/DL (ref 0.76–1.27)
DEPRECATED RDW RBC AUTO: 47.9 FL (ref 37–54)
EOSINOPHIL # BLD MANUAL: 0 10*3/MM3 (ref 0–0.4)
EOSINOPHIL NFR BLD MANUAL: 0 % (ref 0.3–6.2)
ERYTHROCYTE [DISTWIDTH] IN BLOOD BY AUTOMATED COUNT: 13.8 % (ref 12.3–15.4)
GFR SERPL CREATININE-BSD FRML MDRD: 74 ML/MIN/1.73
GLOBULIN UR ELPH-MCNC: 3.3 GM/DL
GLUCOSE SERPL-MCNC: 89 MG/DL (ref 65–99)
HCT VFR BLD AUTO: 36.1 % (ref 37.5–51)
HGB BLD-MCNC: 12.4 G/DL (ref 13–17.7)
INR PPP: 1.34 (ref 0.85–1.16)
LIPASE SERPL-CCNC: 100 U/L (ref 13–60)
LYMPHOCYTES # BLD MANUAL: 0.21 10*3/MM3 (ref 0.7–3.1)
LYMPHOCYTES NFR BLD MANUAL: 2 % (ref 19.6–45.3)
LYMPHOCYTES NFR BLD MANUAL: 2 % (ref 5–12)
LYMPHOCYTES NFR FLD MANUAL: 8 %
MACROPHAGE FLUID: 7 %
MCH RBC QN AUTO: 32.6 PG (ref 26.6–33)
MCHC RBC AUTO-ENTMCNC: 34.3 G/DL (ref 31.5–35.7)
MCV RBC AUTO: 95 FL (ref 79–97)
MONOCYTES # BLD AUTO: 0.21 10*3/MM3 (ref 0.1–0.9)
MONOCYTES NFR FLD: 1 %
NEUTROPHILS # BLD AUTO: 10.1 10*3/MM3 (ref 1.7–7)
NEUTROPHILS NFR BLD MANUAL: 80 % (ref 42.7–76)
NEUTROPHILS NFR FLD MANUAL: 84 %
NEUTS BAND NFR BLD MANUAL: 16 % (ref 0–5)
NRBC SPEC MANUAL: 0 /100 WBC (ref 0–0.2)
PLAT MORPH BLD: NORMAL
PLATELET # BLD AUTO: 371 10*3/MM3 (ref 140–450)
PMV BLD AUTO: 10.4 FL (ref 6–12)
POTASSIUM SERPL-SCNC: 3.9 MMOL/L (ref 3.5–5.2)
PROT SERPL-MCNC: 5.7 G/DL (ref 6–8.5)
PROTHROMBIN TIME: 16.1 SECONDS (ref 11.4–14.4)
RBC # BLD AUTO: 3.8 10*6/MM3 (ref 4.14–5.8)
RBC # FLD AUTO: 2000 /MM3
RBC MORPH BLD: NORMAL
SODIUM SERPL-SCNC: 127 MMOL/L (ref 136–145)
WBC # BLD AUTO: 10.52 10*3/MM3 (ref 3.4–10.8)
WBC # FLD AUTO: ABNORMAL /MM3
WBC MORPH BLD: NORMAL

## 2021-08-07 PROCEDURE — 97161 PT EVAL LOW COMPLEX 20 MIN: CPT | Performed by: PHYSICAL THERAPIST

## 2021-08-07 PROCEDURE — 25010000002 CEFTRIAXONE PER 250 MG: Performed by: NURSE PRACTITIONER

## 2021-08-07 PROCEDURE — 25010000002 ALBUMIN HUMAN 25% PER 50 ML: Performed by: NURSE PRACTITIONER

## 2021-08-07 PROCEDURE — 25010000003 LIDOCAINE 1 % SOLUTION: Performed by: RADIOLOGY

## 2021-08-07 PROCEDURE — C1729 CATH, DRAINAGE: HCPCS | Performed by: INTERNAL MEDICINE

## 2021-08-07 PROCEDURE — 76942 ECHO GUIDE FOR BIOPSY: CPT | Performed by: INTERNAL MEDICINE

## 2021-08-07 PROCEDURE — 83690 ASSAY OF LIPASE: CPT | Performed by: NURSE PRACTITIONER

## 2021-08-07 PROCEDURE — 85007 BL SMEAR W/DIFF WBC COUNT: CPT | Performed by: NURSE PRACTITIONER

## 2021-08-07 PROCEDURE — 87077 CULTURE AEROBIC IDENTIFY: CPT | Performed by: INTERNAL MEDICINE

## 2021-08-07 PROCEDURE — P9047 ALBUMIN (HUMAN), 25%, 50ML: HCPCS | Performed by: NURSE PRACTITIONER

## 2021-08-07 PROCEDURE — 99223 1ST HOSP IP/OBS HIGH 75: CPT | Performed by: NURSE PRACTITIONER

## 2021-08-07 PROCEDURE — 87070 CULTURE OTHR SPECIMN AEROBIC: CPT | Performed by: INTERNAL MEDICINE

## 2021-08-07 PROCEDURE — 80053 COMPREHEN METABOLIC PANEL: CPT | Performed by: NURSE PRACTITIONER

## 2021-08-07 PROCEDURE — 85610 PROTHROMBIN TIME: CPT | Performed by: NURSE PRACTITIONER

## 2021-08-07 PROCEDURE — 87205 SMEAR GRAM STAIN: CPT | Performed by: INTERNAL MEDICINE

## 2021-08-07 PROCEDURE — 87186 SC STD MICRODIL/AGAR DIL: CPT | Performed by: INTERNAL MEDICINE

## 2021-08-07 PROCEDURE — 0W9G3ZZ DRAINAGE OF PERITONEAL CAVITY, PERCUTANEOUS APPROACH: ICD-10-PCS | Performed by: RADIOLOGY

## 2021-08-07 PROCEDURE — 87075 CULTR BACTERIA EXCEPT BLOOD: CPT | Performed by: INTERNAL MEDICINE

## 2021-08-07 PROCEDURE — 99232 SBSQ HOSP IP/OBS MODERATE 35: CPT | Performed by: INTERNAL MEDICINE

## 2021-08-07 PROCEDURE — 85025 COMPLETE CBC W/AUTO DIFF WBC: CPT | Performed by: NURSE PRACTITIONER

## 2021-08-07 PROCEDURE — 89051 BODY FLUID CELL COUNT: CPT | Performed by: INTERNAL MEDICINE

## 2021-08-07 PROCEDURE — 29581 APPL MULTLAYER CMPRN SYS LEG: CPT | Performed by: PHYSICAL THERAPIST

## 2021-08-07 RX ORDER — MIRTAZAPINE 15 MG/1
30 TABLET, FILM COATED ORAL NIGHTLY
Status: DISCONTINUED | OUTPATIENT
Start: 2021-08-07 | End: 2021-08-09 | Stop reason: HOSPADM

## 2021-08-07 RX ORDER — ALBUMIN (HUMAN) 12.5 G/50ML
50 SOLUTION INTRAVENOUS ONCE
Status: COMPLETED | OUTPATIENT
Start: 2021-08-07 | End: 2021-08-07

## 2021-08-07 RX ORDER — HYDROCODONE BITARTRATE AND ACETAMINOPHEN 10; 325 MG/1; MG/1
1 TABLET ORAL EVERY 6 HOURS PRN
Status: DISCONTINUED | OUTPATIENT
Start: 2021-08-07 | End: 2021-08-09

## 2021-08-07 RX ORDER — LIDOCAINE HYDROCHLORIDE 10 MG/ML
10 INJECTION, SOLUTION INFILTRATION; PERINEURAL ONCE
Status: COMPLETED | OUTPATIENT
Start: 2021-08-07 | End: 2021-08-07

## 2021-08-07 RX ORDER — ONDANSETRON 2 MG/ML
4 INJECTION INTRAMUSCULAR; INTRAVENOUS EVERY 6 HOURS PRN
Status: DISCONTINUED | OUTPATIENT
Start: 2021-08-07 | End: 2021-08-09 | Stop reason: HOSPADM

## 2021-08-07 RX ORDER — ACETAMINOPHEN 500 MG
500 TABLET ORAL EVERY 4 HOURS PRN
Status: DISCONTINUED | OUTPATIENT
Start: 2021-08-07 | End: 2021-08-09 | Stop reason: HOSPADM

## 2021-08-07 RX ORDER — CEFTRIAXONE SODIUM 1 G/50ML
1 INJECTION, SOLUTION INTRAVENOUS EVERY 24 HOURS
Status: DISCONTINUED | OUTPATIENT
Start: 2021-08-07 | End: 2021-08-09

## 2021-08-07 RX ADMIN — HYDROCODONE BITARTRATE AND ACETAMINOPHEN 1 TABLET: 5; 325 TABLET ORAL at 06:57

## 2021-08-07 RX ADMIN — DOCUSATE SODIUM 50MG AND SENNOSIDES 8.6MG 2 TABLET: 8.6; 5 TABLET, FILM COATED ORAL at 20:06

## 2021-08-07 RX ADMIN — HYDROCODONE BITARTRATE AND ACETAMINOPHEN 1 TABLET: 10; 325 TABLET ORAL at 09:29

## 2021-08-07 RX ADMIN — PANCRELIPASE 24000 UNITS OF LIPASE: 24000; 76000; 120000 CAPSULE, DELAYED RELEASE PELLETS ORAL at 17:29

## 2021-08-07 RX ADMIN — HYDROCODONE BITARTRATE AND ACETAMINOPHEN 1 TABLET: 10; 325 TABLET ORAL at 20:12

## 2021-08-07 RX ADMIN — FUROSEMIDE 40 MG: 40 TABLET ORAL at 17:29

## 2021-08-07 RX ADMIN — DOCUSATE SODIUM 50MG AND SENNOSIDES 8.6MG 2 TABLET: 8.6; 5 TABLET, FILM COATED ORAL at 09:30

## 2021-08-07 RX ADMIN — ALBUMIN HUMAN 50 G: 0.25 SOLUTION INTRAVENOUS at 22:53

## 2021-08-07 RX ADMIN — MIRTAZAPINE 30 MG: 15 TABLET, FILM COATED ORAL at 20:06

## 2021-08-07 RX ADMIN — PANCRELIPASE 24000 UNITS OF LIPASE: 24000; 76000; 120000 CAPSULE, DELAYED RELEASE PELLETS ORAL at 09:30

## 2021-08-07 RX ADMIN — HYDROCODONE BITARTRATE AND ACETAMINOPHEN 1 TABLET: 10; 325 TABLET ORAL at 15:52

## 2021-08-07 RX ADMIN — LIDOCAINE HYDROCHLORIDE 10 ML: 10 INJECTION, SOLUTION INFILTRATION; PERINEURAL at 12:39

## 2021-08-07 RX ADMIN — SODIUM CHLORIDE, PRESERVATIVE FREE 10 ML: 5 INJECTION INTRAVENOUS at 20:13

## 2021-08-07 RX ADMIN — ASPIRIN 81 MG: 81 TABLET, CHEWABLE ORAL at 09:30

## 2021-08-07 RX ADMIN — CEFTRIAXONE SODIUM 1 G: 1 INJECTION, SOLUTION INTRAVENOUS at 20:06

## 2021-08-07 RX ADMIN — FUROSEMIDE 40 MG: 40 TABLET ORAL at 09:30

## 2021-08-08 LAB
ANION GAP SERPL CALCULATED.3IONS-SCNC: 13 MMOL/L (ref 5–15)
BUN SERPL-MCNC: 83 MG/DL (ref 8–23)
BUN/CREAT SERPL: 79.8 (ref 7–25)
CALCIUM SPEC-SCNC: 8.3 MG/DL (ref 8.6–10.5)
CHLORIDE SERPL-SCNC: 91 MMOL/L (ref 98–107)
CO2 SERPL-SCNC: 25 MMOL/L (ref 22–29)
CREAT SERPL-MCNC: 1.04 MG/DL (ref 0.76–1.27)
DEPRECATED RDW RBC AUTO: 43.6 FL (ref 37–54)
ERYTHROCYTE [DISTWIDTH] IN BLOOD BY AUTOMATED COUNT: 13.3 % (ref 12.3–15.4)
GFR SERPL CREATININE-BSD FRML MDRD: 71 ML/MIN/1.73
GLUCOSE BLDC GLUCOMTR-MCNC: 128 MG/DL (ref 70–130)
GLUCOSE SERPL-MCNC: 62 MG/DL (ref 65–99)
HCT VFR BLD AUTO: 30.9 % (ref 37.5–51)
HGB BLD-MCNC: 11.1 G/DL (ref 13–17.7)
MCH RBC QN AUTO: 32.4 PG (ref 26.6–33)
MCHC RBC AUTO-ENTMCNC: 35.9 G/DL (ref 31.5–35.7)
MCV RBC AUTO: 90.1 FL (ref 79–97)
PLATELET # BLD AUTO: 257 10*3/MM3 (ref 140–450)
PMV BLD AUTO: 10.3 FL (ref 6–12)
POTASSIUM SERPL-SCNC: 4.4 MMOL/L (ref 3.5–5.2)
RBC # BLD AUTO: 3.43 10*6/MM3 (ref 4.14–5.8)
SODIUM SERPL-SCNC: 129 MMOL/L (ref 136–145)
WBC # BLD AUTO: 14.54 10*3/MM3 (ref 3.4–10.8)

## 2021-08-08 PROCEDURE — 99232 SBSQ HOSP IP/OBS MODERATE 35: CPT | Performed by: INTERNAL MEDICINE

## 2021-08-08 PROCEDURE — 80048 BASIC METABOLIC PNL TOTAL CA: CPT | Performed by: INTERNAL MEDICINE

## 2021-08-08 PROCEDURE — 85027 COMPLETE CBC AUTOMATED: CPT | Performed by: INTERNAL MEDICINE

## 2021-08-08 PROCEDURE — 82962 GLUCOSE BLOOD TEST: CPT

## 2021-08-08 PROCEDURE — 99232 SBSQ HOSP IP/OBS MODERATE 35: CPT | Performed by: NURSE PRACTITIONER

## 2021-08-08 PROCEDURE — 25010000002 CEFTRIAXONE PER 250 MG: Performed by: NURSE PRACTITIONER

## 2021-08-08 RX ORDER — MIDODRINE HYDROCHLORIDE 5 MG/1
5 TABLET ORAL
Status: DISCONTINUED | OUTPATIENT
Start: 2021-08-08 | End: 2021-08-09 | Stop reason: HOSPADM

## 2021-08-08 RX ADMIN — SODIUM CHLORIDE, PRESERVATIVE FREE 10 ML: 5 INJECTION INTRAVENOUS at 08:22

## 2021-08-08 RX ADMIN — ACETAMINOPHEN 500 MG: 500 TABLET, FILM COATED ORAL at 13:44

## 2021-08-08 RX ADMIN — MIDODRINE HYDROCHLORIDE 5 MG: 5 TABLET ORAL at 13:44

## 2021-08-08 RX ADMIN — ASPIRIN 81 MG: 81 TABLET, CHEWABLE ORAL at 08:22

## 2021-08-08 RX ADMIN — HYDROCODONE BITARTRATE AND ACETAMINOPHEN 1 TABLET: 10; 325 TABLET ORAL at 08:20

## 2021-08-08 RX ADMIN — HYDROCODONE BITARTRATE AND ACETAMINOPHEN 1 TABLET: 10; 325 TABLET ORAL at 02:13

## 2021-08-08 RX ADMIN — PANCRELIPASE 24000 UNITS OF LIPASE: 24000; 76000; 120000 CAPSULE, DELAYED RELEASE PELLETS ORAL at 08:22

## 2021-08-08 RX ADMIN — HYDROCODONE BITARTRATE AND ACETAMINOPHEN 1 TABLET: 10; 325 TABLET ORAL at 21:51

## 2021-08-08 RX ADMIN — PANCRELIPASE 24000 UNITS OF LIPASE: 24000; 76000; 120000 CAPSULE, DELAYED RELEASE PELLETS ORAL at 17:44

## 2021-08-08 RX ADMIN — MIRTAZAPINE 30 MG: 15 TABLET, FILM COATED ORAL at 19:46

## 2021-08-08 RX ADMIN — PANCRELIPASE 24000 UNITS OF LIPASE: 24000; 76000; 120000 CAPSULE, DELAYED RELEASE PELLETS ORAL at 12:48

## 2021-08-08 RX ADMIN — HYDROCODONE BITARTRATE AND ACETAMINOPHEN 1 TABLET: 10; 325 TABLET ORAL at 15:58

## 2021-08-08 RX ADMIN — CEFTRIAXONE SODIUM 1 G: 1 INJECTION, SOLUTION INTRAVENOUS at 19:47

## 2021-08-09 ENCOUNTER — TELEPHONE (OUTPATIENT)
Dept: ONCOLOGY | Facility: CLINIC | Age: 71
End: 2021-08-09

## 2021-08-09 ENCOUNTER — READMISSION MANAGEMENT (OUTPATIENT)
Dept: CALL CENTER | Facility: HOSPITAL | Age: 71
End: 2021-08-09

## 2021-08-09 ENCOUNTER — APPOINTMENT (OUTPATIENT)
Dept: ULTRASOUND IMAGING | Facility: HOSPITAL | Age: 71
End: 2021-08-09

## 2021-08-09 VITALS
DIASTOLIC BLOOD PRESSURE: 85 MMHG | RESPIRATION RATE: 10 BRPM | BODY MASS INDEX: 14.56 KG/M2 | TEMPERATURE: 97.3 F | HEART RATE: 75 BPM | OXYGEN SATURATION: 100 % | HEIGHT: 71 IN | WEIGHT: 104 LBS | SYSTOLIC BLOOD PRESSURE: 115 MMHG

## 2021-08-09 LAB
ANION GAP SERPL CALCULATED.3IONS-SCNC: 15 MMOL/L (ref 5–15)
BACTERIA FLD CULT: ABNORMAL
BH CV LOWER VASCULAR LEFT COMMON FEMORAL AUGMENT: NORMAL
BH CV LOWER VASCULAR LEFT COMMON FEMORAL COMPRESS: NORMAL
BH CV LOWER VASCULAR LEFT COMMON FEMORAL PHASIC: NORMAL
BH CV LOWER VASCULAR LEFT COMMON FEMORAL SPONT: NORMAL
BH CV LOWER VASCULAR LEFT DISTAL FEMORAL AUGMENT: NORMAL
BH CV LOWER VASCULAR LEFT DISTAL FEMORAL COMPRESS: NORMAL
BH CV LOWER VASCULAR LEFT DISTAL FEMORAL PHASIC: NORMAL
BH CV LOWER VASCULAR LEFT DISTAL FEMORAL SPONT: NORMAL
BH CV LOWER VASCULAR LEFT GASTRONEMIUS COMPRESS: NORMAL
BH CV LOWER VASCULAR LEFT GREATER SAPH AK COMPRESS: NORMAL
BH CV LOWER VASCULAR LEFT GREATER SAPH BK COMPRESS: NORMAL
BH CV LOWER VASCULAR LEFT LESSER SAPH COMPRESS: NORMAL
BH CV LOWER VASCULAR LEFT MID FEMORAL AUGMENT: NORMAL
BH CV LOWER VASCULAR LEFT MID FEMORAL COMPRESS: NORMAL
BH CV LOWER VASCULAR LEFT MID FEMORAL PHASIC: NORMAL
BH CV LOWER VASCULAR LEFT MID FEMORAL SPONT: NORMAL
BH CV LOWER VASCULAR LEFT PERONEAL AUGMENT: NORMAL
BH CV LOWER VASCULAR LEFT PERONEAL COMPRESS: NORMAL
BH CV LOWER VASCULAR LEFT POPLITEAL AUGMENT: NORMAL
BH CV LOWER VASCULAR LEFT POPLITEAL COMPRESS: NORMAL
BH CV LOWER VASCULAR LEFT POPLITEAL PHASIC: NORMAL
BH CV LOWER VASCULAR LEFT POPLITEAL SPONT: NORMAL
BH CV LOWER VASCULAR LEFT POSTERIOR TIBIAL AUGMENT: NORMAL
BH CV LOWER VASCULAR LEFT POSTERIOR TIBIAL COMPRESS: NORMAL
BH CV LOWER VASCULAR LEFT PROFUNDA FEMORAL AUGMENT: NORMAL
BH CV LOWER VASCULAR LEFT PROFUNDA FEMORAL PHASIC: NORMAL
BH CV LOWER VASCULAR LEFT PROFUNDA FEMORAL SPONT: NORMAL
BH CV LOWER VASCULAR LEFT PROXIMAL FEMORAL AUGMENT: NORMAL
BH CV LOWER VASCULAR LEFT PROXIMAL FEMORAL COMPRESS: NORMAL
BH CV LOWER VASCULAR LEFT PROXIMAL FEMORAL PHASIC: NORMAL
BH CV LOWER VASCULAR LEFT PROXIMAL FEMORAL SPONT: NORMAL
BH CV LOWER VASCULAR LEFT SAPHENOFEMORAL JUNCTION AUGMENT: NORMAL
BH CV LOWER VASCULAR LEFT SAPHENOFEMORAL JUNCTION COMPRESS: NORMAL
BH CV LOWER VASCULAR LEFT SAPHENOFEMORAL JUNCTION PHASIC: NORMAL
BH CV LOWER VASCULAR LEFT SAPHENOFEMORAL JUNCTION SPONT: NORMAL
BH CV LOWER VASCULAR RIGHT COMMON FEMORAL AUGMENT: NORMAL
BH CV LOWER VASCULAR RIGHT COMMON FEMORAL COMPRESS: NORMAL
BH CV LOWER VASCULAR RIGHT COMMON FEMORAL PHASIC: NORMAL
BH CV LOWER VASCULAR RIGHT COMMON FEMORAL SPONT: NORMAL
BH CV LOWER VASCULAR RIGHT DISTAL FEMORAL AUGMENT: NORMAL
BH CV LOWER VASCULAR RIGHT DISTAL FEMORAL COMPRESS: NORMAL
BH CV LOWER VASCULAR RIGHT DISTAL FEMORAL PHASIC: NORMAL
BH CV LOWER VASCULAR RIGHT DISTAL FEMORAL SPONT: NORMAL
BH CV LOWER VASCULAR RIGHT GASTRONEMIUS COMPRESS: NORMAL
BH CV LOWER VASCULAR RIGHT GREATER SAPH AK COMPRESS: NORMAL
BH CV LOWER VASCULAR RIGHT GREATER SAPH BK COMPRESS: NORMAL
BH CV LOWER VASCULAR RIGHT LESSER SAPH COMPRESS: NORMAL
BH CV LOWER VASCULAR RIGHT MID FEMORAL AUGMENT: NORMAL
BH CV LOWER VASCULAR RIGHT MID FEMORAL COMPRESS: NORMAL
BH CV LOWER VASCULAR RIGHT MID FEMORAL PHASIC: NORMAL
BH CV LOWER VASCULAR RIGHT MID FEMORAL SPONT: NORMAL
BH CV LOWER VASCULAR RIGHT PERONEAL AUGMENT: NORMAL
BH CV LOWER VASCULAR RIGHT PERONEAL COMPRESS: NORMAL
BH CV LOWER VASCULAR RIGHT POPLITEAL AUGMENT: NORMAL
BH CV LOWER VASCULAR RIGHT POPLITEAL COMPRESS: NORMAL
BH CV LOWER VASCULAR RIGHT POPLITEAL PHASIC: NORMAL
BH CV LOWER VASCULAR RIGHT POPLITEAL SPONT: NORMAL
BH CV LOWER VASCULAR RIGHT POSTERIOR TIBIAL AUGMENT: NORMAL
BH CV LOWER VASCULAR RIGHT POSTERIOR TIBIAL COMPRESS: NORMAL
BH CV LOWER VASCULAR RIGHT PROFUNDA FEMORAL AUGMENT: NORMAL
BH CV LOWER VASCULAR RIGHT PROFUNDA FEMORAL PHASIC: NORMAL
BH CV LOWER VASCULAR RIGHT PROFUNDA FEMORAL SPONT: NORMAL
BH CV LOWER VASCULAR RIGHT PROXIMAL FEMORAL AUGMENT: NORMAL
BH CV LOWER VASCULAR RIGHT PROXIMAL FEMORAL COMPRESS: NORMAL
BH CV LOWER VASCULAR RIGHT PROXIMAL FEMORAL PHASIC: NORMAL
BH CV LOWER VASCULAR RIGHT PROXIMAL FEMORAL SPONT: NORMAL
BH CV LOWER VASCULAR RIGHT SAPHENOFEMORAL JUNCTION AUGMENT: NORMAL
BH CV LOWER VASCULAR RIGHT SAPHENOFEMORAL JUNCTION COMPRESS: NORMAL
BH CV LOWER VASCULAR RIGHT SAPHENOFEMORAL JUNCTION PHASIC: NORMAL
BH CV LOWER VASCULAR RIGHT SAPHENOFEMORAL JUNCTION SPONT: NORMAL
BUN SERPL-MCNC: 75 MG/DL (ref 8–23)
BUN/CREAT SERPL: 90.4 (ref 7–25)
CALCIUM SPEC-SCNC: 8.3 MG/DL (ref 8.6–10.5)
CHLORIDE SERPL-SCNC: 96 MMOL/L (ref 98–107)
CO2 SERPL-SCNC: 22 MMOL/L (ref 22–29)
CREAT SERPL-MCNC: 0.83 MG/DL (ref 0.76–1.27)
DEPRECATED RDW RBC AUTO: 47.8 FL (ref 37–54)
ERYTHROCYTE [DISTWIDTH] IN BLOOD BY AUTOMATED COUNT: 14 % (ref 12.3–15.4)
GFR SERPL CREATININE-BSD FRML MDRD: 92 ML/MIN/1.73
GLUCOSE SERPL-MCNC: 80 MG/DL (ref 65–99)
GRAM STN SPEC: ABNORMAL
HCT VFR BLD AUTO: 35 % (ref 37.5–51)
HGB BLD-MCNC: 12.1 G/DL (ref 13–17.7)
MAXIMAL PREDICTED HEART RATE: 150 BPM
MCH RBC QN AUTO: 32.3 PG (ref 26.6–33)
MCHC RBC AUTO-ENTMCNC: 34.6 G/DL (ref 31.5–35.7)
MCV RBC AUTO: 93.3 FL (ref 79–97)
PLATELET # BLD AUTO: 276 10*3/MM3 (ref 140–450)
PMV BLD AUTO: 10.5 FL (ref 6–12)
POTASSIUM SERPL-SCNC: 4.8 MMOL/L (ref 3.5–5.2)
RBC # BLD AUTO: 3.75 10*6/MM3 (ref 4.14–5.8)
SODIUM SERPL-SCNC: 133 MMOL/L (ref 136–145)
STRESS TARGET HR: 128 BPM
WBC # BLD AUTO: 8.84 10*3/MM3 (ref 3.4–10.8)

## 2021-08-09 PROCEDURE — 97597 DBRDMT OPN WND 1ST 20 CM/<: CPT

## 2021-08-09 PROCEDURE — 76705 ECHO EXAM OF ABDOMEN: CPT

## 2021-08-09 PROCEDURE — 80048 BASIC METABOLIC PNL TOTAL CA: CPT | Performed by: INTERNAL MEDICINE

## 2021-08-09 PROCEDURE — 99223 1ST HOSP IP/OBS HIGH 75: CPT | Performed by: INTERNAL MEDICINE

## 2021-08-09 PROCEDURE — C1729 CATH, DRAINAGE: HCPCS

## 2021-08-09 PROCEDURE — 85027 COMPLETE CBC AUTOMATED: CPT | Performed by: INTERNAL MEDICINE

## 2021-08-09 PROCEDURE — 99233 SBSQ HOSP IP/OBS HIGH 50: CPT | Performed by: HOSPITALIST

## 2021-08-09 RX ORDER — LEVOFLOXACIN 500 MG/1
500 TABLET, FILM COATED ORAL EVERY 24 HOURS
Status: DISCONTINUED | OUTPATIENT
Start: 2021-08-09 | End: 2021-08-09 | Stop reason: HOSPADM

## 2021-08-09 RX ORDER — OXYCODONE HYDROCHLORIDE 5 MG/1
10 TABLET ORAL EVERY 6 HOURS PRN
Status: DISCONTINUED | OUTPATIENT
Start: 2021-08-09 | End: 2021-08-09 | Stop reason: HOSPADM

## 2021-08-09 RX ORDER — LEVOFLOXACIN 500 MG/1
500 TABLET, FILM COATED ORAL EVERY 24 HOURS
Qty: 4 TABLET | Refills: 0 | Status: SHIPPED | OUTPATIENT
Start: 2021-08-09 | End: 2021-08-13

## 2021-08-09 RX ORDER — OXYCODONE HYDROCHLORIDE 10 MG/1
10 TABLET ORAL EVERY 6 HOURS PRN
Qty: 20 TABLET | Refills: 0 | Status: SHIPPED | OUTPATIENT
Start: 2021-08-09

## 2021-08-09 RX ORDER — MIDODRINE HYDROCHLORIDE 5 MG/1
5 TABLET ORAL
Qty: 90 TABLET | Refills: 1 | Status: SHIPPED | OUTPATIENT
Start: 2021-08-09 | End: 2021-09-08

## 2021-08-09 RX ORDER — OXYCODONE HYDROCHLORIDE 5 MG/1
10 TABLET ORAL EVERY 4 HOURS PRN
Status: DISCONTINUED | OUTPATIENT
Start: 2021-08-09 | End: 2021-08-09

## 2021-08-09 RX ADMIN — OXYCODONE 10 MG: 5 TABLET ORAL at 12:10

## 2021-08-09 RX ADMIN — PANCRELIPASE 24000 UNITS OF LIPASE: 24000; 76000; 120000 CAPSULE, DELAYED RELEASE PELLETS ORAL at 12:10

## 2021-08-09 RX ADMIN — HYDROCODONE BITARTRATE AND ACETAMINOPHEN 1 TABLET: 10; 325 TABLET ORAL at 02:44

## 2021-08-09 RX ADMIN — LEVOFLOXACIN 500 MG: 500 TABLET, FILM COATED ORAL at 15:35

## 2021-08-09 RX ADMIN — ASPIRIN 81 MG: 81 TABLET, CHEWABLE ORAL at 09:10

## 2021-08-09 RX ADMIN — HYDROCODONE BITARTRATE AND ACETAMINOPHEN 1 TABLET: 10; 325 TABLET ORAL at 09:10

## 2021-08-09 RX ADMIN — OXYCODONE 10 MG: 5 TABLET ORAL at 17:22

## 2021-08-09 RX ADMIN — MIDODRINE HYDROCHLORIDE 5 MG: 5 TABLET ORAL at 12:10

## 2021-08-09 RX ADMIN — PANCRELIPASE 24000 UNITS OF LIPASE: 24000; 76000; 120000 CAPSULE, DELAYED RELEASE PELLETS ORAL at 09:10

## 2021-08-09 RX ADMIN — SODIUM CHLORIDE, PRESERVATIVE FREE 10 ML: 5 INJECTION INTRAVENOUS at 10:05

## 2021-08-09 RX ADMIN — FUROSEMIDE 40 MG: 40 TABLET ORAL at 09:10

## 2021-08-09 RX ADMIN — DOCUSATE SODIUM 50MG AND SENNOSIDES 8.6MG 2 TABLET: 8.6; 5 TABLET, FILM COATED ORAL at 12:10

## 2021-08-09 RX ADMIN — MIDODRINE HYDROCHLORIDE 5 MG: 5 TABLET ORAL at 09:10

## 2021-08-09 RX ADMIN — MIDODRINE HYDROCHLORIDE 5 MG: 5 TABLET ORAL at 16:34

## 2021-08-09 RX ADMIN — MIDODRINE HYDROCHLORIDE 5 MG: 5 TABLET ORAL at 06:20

## 2021-08-09 NOTE — TELEPHONE ENCOUNTER
Caller: SID     Relationship to patient: NURSE    Best call back number: [phone]    Chief complaint: PER SID, PATIENT LIVES 3 HOURS AWAY.  HE HAS APPT ON 8/17/21 FOR A PARACENTESIS AT [facility] AND AN APPT IN OUR OFFICE ON 8/16/21.  SHE IS WANTING TO KNOW IF WE COULD POSSIBLY MOVE PATIENTS APPT TO 8/17/21 SO PATIENT WILL ONLY HAVE ONE TRAVEL DAY INSTEAD OF TWO.      Type of visit: NEW PATIENT     Requested date: 8/17/21    If rescheduling, when is the original appointment: 8/16/21    Additional notes: PLEASE CONTACT SID TO ADVISE.  PATIENT IS CURRENTLY STILL IN HOSPITAL. TO BE DISCHARGED LATER TODAY.

## 2021-08-09 NOTE — OUTREACH NOTE
Prep Survey      Responses   [facility] patient discharged from?  [place]   Is LACE score < 7 ?  No   Emergency Room discharge w/ pulse ox?  No   Eligibility  Not Eligible   What are the reasons patient is not eligible?  Hospice/Pallative Care   Does the patient have one of the following disease processes/diagnoses(primary or secondary)?  Other   Prep survey completed?  Yes          Ada Bright RN

## 2021-08-12 LAB — BACTERIA SPEC ANAEROBE CULT: NORMAL

## 2021-08-17 ENCOUNTER — HOSPITAL ENCOUNTER (OUTPATIENT)
Dept: INTERVENTIONAL RADIOLOGY/VASCULAR | Facility: HOSPITAL | Age: 71
End: 2021-08-17

## 2021-10-04 ENCOUNTER — TELEPHONE (OUTPATIENT)
Dept: GASTROENTEROLOGY | Facility: CLINIC | Age: 71
End: 2021-10-04

## 2021-10-04 NOTE — TELEPHONE ENCOUNTER
Trent,    You referred patient to Dr. Lepe at [facility]. Patient had an appt on 08.10.21 but was a no show. I have tried to call him and sent letter. Please advise if I need to do anything else or can we close referral.

## (undated) DEVICE — CONTN GRAD MEAS TRIANG 32OZ BLK

## (undated) DEVICE — ERBE NESSY®PLATE 170 SPLIT; 168CM²; CABLE 3M: Brand: ERBE

## (undated) DEVICE — INTRO ACCSR BLNT TP

## (undated) DEVICE — LUBE GEL ENDOGLIDE 1.1OZ

## (undated) DEVICE — SYR LL TP 10ML STRL

## (undated) DEVICE — KT ORCA ORCAPOD DISP STRL

## (undated) DEVICE — SPHINCTEROTOME: Brand: DREAMTOME™ RX 44

## (undated) DEVICE — WIREGUIDED CYTOLOGY BRUSH: Brand: RX CYTOLOGY BRUSH

## (undated) DEVICE — TRIPLE LUMEN ERCP CANNULA: Brand: TANDEM XL

## (undated) DEVICE — TUBING, SUCTION, 1/4" X 10', STRAIGHT: Brand: MEDLINE

## (undated) DEVICE — GW JAG STR .035IN 450CM

## (undated) DEVICE — SYR LUERLOK 50ML

## (undated) DEVICE — SPNG VERSALON 4X4 4PLY NONSTRL LF BG/200

## (undated) DEVICE — HYBRID CO2 TUBING/CAP SET FOR OLYMPUS® SCOPES & CO2 SOURCE: Brand: ERBE

## (undated) DEVICE — SOL IRR H2O BTL 1000ML STRL

## (undated) DEVICE — THE BITE BLOCK MAXI, LATEX FREE STRAP IS USED TO PROTECT THE ENDOSCOPE INSERTION TUBE FROM BEING BITTEN BY THE PATIENT.

## (undated) DEVICE — CANNULATING SPHINCTEROTOME: Brand: JAGTOME™ REVOLUTION RX

## (undated) DEVICE — SYR LUERLOK 20CC BX/50

## (undated) DEVICE — SUCTION CANISTER, 1000CC,SAFELINER: Brand: DEROYAL

## (undated) DEVICE — TRIPLE LUMEN SPHINCTEROTOME: Brand: ULTRATOME XL

## (undated) DEVICE — DEV LK WIREGUIDE FUSN OLYMP SCP

## (undated) DEVICE — BOWL UTIL STRL 32OZ